# Patient Record
Sex: FEMALE | Race: WHITE | Employment: UNEMPLOYED | URBAN - METROPOLITAN AREA
[De-identification: names, ages, dates, MRNs, and addresses within clinical notes are randomized per-mention and may not be internally consistent; named-entity substitution may affect disease eponyms.]

---

## 2019-02-25 ENCOUNTER — HOSPITAL ENCOUNTER (OUTPATIENT)
Age: 59
Discharge: HOME | End: 2019-02-27

## 2019-02-25 LAB — PROCALCITONIN: 0.14 NG/ML (ref 0–0.08)

## 2019-02-25 PROCEDURE — 84145 PROCALCITONIN (PCT): CPT

## 2019-02-26 ENCOUNTER — HOSPITAL ENCOUNTER (OUTPATIENT)
Age: 59
Discharge: HOME | End: 2019-02-28

## 2019-02-26 LAB — PROCALCITONIN: 1.52 NG/ML (ref 0–0.08)

## 2019-02-26 PROCEDURE — 84145 PROCALCITONIN (PCT): CPT

## 2020-12-20 LAB — HEMOGLOBIN: 6.8 G/DL (ref 11.5–15.5)

## 2021-01-18 ENCOUNTER — TELEPHONE (OUTPATIENT)
Dept: VASCULAR SURGERY | Age: 61
End: 2021-01-18

## 2021-01-19 ENCOUNTER — OFFICE VISIT (OUTPATIENT)
Dept: VASCULAR SURGERY | Age: 61
End: 2021-01-19
Payer: MEDICARE

## 2021-01-19 VITALS — SYSTOLIC BLOOD PRESSURE: 128 MMHG | DIASTOLIC BLOOD PRESSURE: 64 MMHG

## 2021-01-19 DIAGNOSIS — Z99.2 ENCOUNTER REGARDING VASCULAR ACCESS FOR DIALYSIS FOR ESRD (HCC): ICD-10-CM

## 2021-01-19 DIAGNOSIS — N18.6 ESRD ON DIALYSIS (HCC): Primary | ICD-10-CM

## 2021-01-19 DIAGNOSIS — N18.6 ENCOUNTER REGARDING VASCULAR ACCESS FOR DIALYSIS FOR ESRD (HCC): ICD-10-CM

## 2021-01-19 DIAGNOSIS — Z99.2 ESRD ON DIALYSIS (HCC): Primary | ICD-10-CM

## 2021-01-19 PROCEDURE — 99203 OFFICE O/P NEW LOW 30 MIN: CPT | Performed by: SURGERY

## 2021-01-19 RX ORDER — ENTECAVIR 0.5 MG/1
TABLET, FILM COATED ORAL
COMMUNITY
Start: 2020-12-18

## 2021-01-19 RX ORDER — SENNOSIDES 8.6 MG
TABLET ORAL
COMMUNITY
Start: 2020-11-20

## 2021-01-19 RX ORDER — DAPSONE 100 MG/1
100 TABLET ORAL DAILY
COMMUNITY
Start: 2020-09-01

## 2021-01-19 RX ORDER — GABAPENTIN 300 MG/1
300 CAPSULE ORAL 2 TIMES DAILY
COMMUNITY
Start: 2020-12-18

## 2021-01-19 RX ORDER — HYDRALAZINE HYDROCHLORIDE 100 MG/1
100 TABLET, FILM COATED ORAL 2 TIMES DAILY
COMMUNITY
Start: 2020-12-18

## 2021-01-19 RX ORDER — TACROLIMUS 1 MG/1
CAPSULE ORAL
COMMUNITY
Start: 2020-11-18

## 2021-01-19 NOTE — PROGRESS NOTES
Vascular Surgery Outpatient Consultation      Chief Complaint   Patient presents with    Surgical Consult     Needs AVF, dialysis days M-W-F, patient is right handed. Reason for Consult: Dialysis access    Requesting Physician:  Dr. Luis Link:                The patient is a 64 y.o. female who is referred for evaluation of hemodialysis access. The patient developed acute kidney injury following surgery on a ventral hernia. She [facility]. She is right-hand dominant. She is currently being dialyzed through a right jugular catheter. Past Medical History:        Diagnosis Date    Arthritis     Cir[facility] liver ([facility]     Depression     Diabetes mellitus ([facility]     Eye abnormalities     blood behid retina    Hernia of abdominal wall     Hypertension     Neuropathy      Past Surgical History:        Procedure Laterality Date    CHOLECYSTECTOMY      FRACTURE SURGERY      HYSTERECTOMY      LIVER TRANSPLANT  2015    SHOULDER SURGERY Right     TOOTH EXTRACTION       Current Medications:   Prior to Admission medications    Medication Sig Start Date End Date Taking? Authorizing Provider   dapsone 100 MG tablet Take 100 mg by mouth daily 9/1/20  Yes Historical Provider, MD   entecavir (BARACLUDE) 0.5 MG tablet take 1 tablet by mouth every 48 hours 12/18/20  Yes Historical Provider, MD   gabapentin (NEURONTIN) 300 MG capsule Take 300 mg by mouth 2 times daily.  12/18/20  Yes Historical Provider, MD   hydrALAZINE (APRESOLINE) 100 MG tablet Take 100 mg by mouth 2 times daily 12/18/20  Yes Historical Provider, MD   senna (SENOKOT) 8.6 MG TABS tablet  11/20/20  Yes Historical Provider, MD   tacrolimus (PROGRAF) 1 MG capsule TAKE THREE (3) CAPSULES BY MOUTH TWICE DAILY 11/18/20  Yes Historical Provider, MD   tacrolimus (PROGRAF) 0.5 MG capsule Take 1 mg by mouth 2 times daily   Yes Historical Provider, MD   predniSONE (DELTASONE) 1 MG tablet Take 2 mg by mouth daily   Yes Historical Provider, MD   citalopram (CELEXA) 40 MG tablet Take 40 mg by mouth daily. Yes Historical Provider, MD   hydrOXYzine (ATARAX) 25 MG tablet Take 25 mg by mouth 3 times daily as needed. Yes Historical Provider, MD   lisinopril (PRINIVIL;ZESTRIL) 5 MG tablet Take 5 mg by mouth daily. Yes Historical Provider, MD   pantoprazole (PROTONIX) 40 MG tablet Take 40 mg by mouth daily. Yes Historical Provider, MD   Nelfinavir Mesylate (VIRACEPT PO) Take by mouth    Historical Provider, MD   mupirocin (BACTROBAN) 2 % cream Apply topically 3 times daily. Patient not taking: Reported on 2021   Rebecca Ruvalcaba DO   NONFORMULARY Indications: Stevie Dillard rejection pill\"    Historical Provider, MD   hydrochlorothiazide (MICROZIDE) 12.5 MG capsule Take 12.5 mg by mouth daily    Historical Provider, MD   furosemide (LASIX) 40 MG tablet Take 40 mg by mouth daily. Historical Provider, MD     Allergies:  [place] [place] tape]    Social History     Socioeconomic History    Marital status:       Spouse name: Not on file    Number of children: Not on file    Years of education: Not on file    Highest education level: Not on file   Occupational History    Not on file   Social Needs    Financial resource strain: Not on file    Food insecurity     Worry: Not on file     Inability: Not on file    Transportation needs     Medical: Not on file     Non-medical: Not on file   Tobacco Use    Smoking status: Former Smoker     Packs/day: 0.50     Types: Cigarettes     Quit date: 2019     Years since quittin.0    Smokeless tobacco: Never Used   Substance and Sexual Activity    Alcohol use: No    Drug use: Never    Sexual activity: Not on file   Lifestyle    Physical activity     Days per week: Not on file     Minutes per session: Not on file    Stress: Not on file   Relationships    Social connections     Talks on phone: Not on file     Gets together: Not on file     Attends Quaker service: Not on file     Active member of club or organization: Not on file     Attends meetings of clubs or organizations: Not on file     Relationship status: Not on file    Intimate partner violence     Fear of current or ex partner: Not on file     Emotionally abused: Not on file     Physically abused: Not on file     Forced sexual activity: Not on file   Other Topics Concern    Not on file   Social History Narrative    Not on file        History reviewed. No pertinent family history.     REVIEW OF SYSTEMS (New symptoms):    Eyes:      Blurred vision:  No [x]/Yes []               Diplopia:   No [x]/Yes []               Vision loss:       No [x]/Yes []   Ears, nose, throat:             Hearing loss:    No [x]/Yes []      Vertigo:   No [x]/Yes []                       Swallowing problem:  No [x]/Yes []               Nose bleeds:   No [x]/Yes []      Voice hoarseness:  No [x]/Yes []  Respiratory:             Cough:   No [x]/Yes []      Pleuritic chest pain:  No [x]/Yes []                        Dyspnea:   No [x]/Yes []      Wheezing:   No [x]/Yes []  Cardiovascular:             Angina:   No [x]/Yes []      Palpitations:   No [x]/Yes []          Claudication:    No [x]/Yes []      Leg swelling:   No [x]/Yes []  Gastrointestinal:             Nausea or vomiting:  No [x]/Yes []               Abdominal pain:  No [x]/Yes []                     Intestinal bleeding: No [x]/Yes []  Musculoskeletal:             Leg pain:   No [x]/Yes []      Back pain:   No [x]/Yes []                    Weakness:   No [x]/Yes []  Neurologic:             Numbness:   No [x]/Yes []      Paralysis:   No [x]/Yes []                       Headaches:   No [x]/Yes []  Hematologic, lymphatic:   Anemia:   No [x]/Yes []              Bleeding or bruising:  No [x]/Yes []              Fevers or chills: No [x]/Yes []  Endocrine:             Temp intolerance:   No [x]/Yes []                       Polydipsia, polyuria:  No [x]/Yes []  Skin:              Rash:    No [x]/Yes []      Ulcers:   No [x]/Yes []              Abnorm pigment: No [x]/Yes []  :              Frequency/urgency:  No [x]/Yes []      Hematuria:    No [x]/Yes []                      Incontinence:    No [x]/Yes []    PHYSICAL EXAM:  Vitals:    01/19/21 1400   BP: 128/64     General Appearance: alert and oriented to person, place and time, well developed and well- nourished, in no acute distress  Skin: warm and dry, no rash or erythema  Head: normocephalic and atraumatic  Eyes: extraocular eye movements intact, conjunctivae normal  ENT: external ear and ear canal normal bilaterally, nose without deformity  Pulmonary/Chest: clear to auscultation bilaterally- no wheezes, rales or rhonchi, normal air movement, no respiratory distress  Cardiovascular: normal rate, regular rhythm, normal S1 and S2, no murmurs, no carotid bruits  Abdomen: soft, non-tender, non-distended, normal bowel sounds, no masses or organomegaly  Musculoskeletal: normal range of motion, no joint swelling, deformity or tenderness  Neurologic: no cranial nerve deficit, gait, coordination and speech normal  Extremities: no leg edema bilaterally    PULSE EXAM      Right      Left   Brachial     Radial     Femoral     Popliteal     Dorsalis Pedis     Posterior Tibial     (3=normal, 2=diminished, 1=barely palpable, 4=widened)      Problem List Items Addressed This Visit     Encounter regarding vascular access for dialysis for ESRD ([facility] [address] [facility]    ESRD on dialysis ([facility] [address] [facility] - Primary            I reviewed the vein mapping that suggested adequate veins for fistula however on exam, her veins are not visible and most likely will be too deep. I feel that she would be best served with insertion of a graft either a forearm loop or an upper arm graft. I will make the final determination at the time of surgery and I reviewed this with the patient. She understands and agrees. I reviewed the procedure with the patient.   I discussed the risks, benefits, and alternatives of the procedure. Specifically, arterial or nerve injury, steal syndrome, need for future surgeries and revisions, and chronic pain. The patient understands and consents. All questions were answered. No follow-ups on file.

## 2021-01-26 NOTE — PROGRESS NOTES
Patient agreed to COVID test on 1/29 at the  [facility]  located at  [id number] [place] [address] Drive the hours of 6 am- 2:30 pm, instructed to bring ID. Patient instructed to self isolate until day of surgery. Ok to go to dialysis and work. Wear a mask.

## 2021-01-29 ENCOUNTER — HOSPITAL ENCOUNTER (OUTPATIENT)
Age: 61
Discharge: HOME | End: 2021-01-31
Payer: MEDICARE

## 2021-01-29 DIAGNOSIS — U07.1 COVID-19: ICD-10-CM

## 2021-01-29 PROCEDURE — U0003 INFECTIOUS AGENT DETECTION BY NUCLEIC ACID (DNA OR RNA); SEVERE ACUTE RESPIRATORY SYNDROME CORONAVIRUS 2 (SARS-COV-2) (CORONAVIRUS DISEASE [COVID-19]), AMPLIFIED PROBE TECHNIQUE, MAKING USE OF HIGH THROUGHPUT TECHNOLOGIES AS DESCRIBED BY CMS-2020-01-R: HCPCS

## 2021-01-30 LAB
SARS-COV-2: NOT DETECTED
SOURCE: NORMAL

## 2021-02-03 ENCOUNTER — ANESTHESIA EVENT (OUTPATIENT)
Dept: OPERATING ROOM | Age: 61
End: 2021-02-03
Payer: MEDICARE

## 2021-02-03 RX ORDER — ASPIRIN 81 MG/1
81 TABLET, CHEWABLE ORAL DAILY
COMMUNITY
Start: 2020-08-28

## 2021-02-03 RX ORDER — ROPINIROLE 0.5 MG/1
1 TABLET, FILM COATED ORAL
COMMUNITY
Start: 2020-12-18

## 2021-02-03 NOTE — PROGRESS NOTES
[address] [facility] PRE-ADMISSION TESTING GENERAL INSTRUCTIONS- [facility]-phone number:[phone]    GENERAL INSTRUCTIONS  [x] Antibacterial Soap shower Night before and/or AM of Surgery  [] Ready Prep CHG wipe instruction sheet and wipes given. [x] Nothing by mouth after midnight, including gum, candy, mints, or water. [x] You may brush your teeth, gargle, but do NOT swallow water. []Hibiclens shower  the night before and the morning of surgery. Do not use             Hibiclens on your face or head. [x]No smoking, chewing tobacco, illegal drugs, or alcohol within 24 hours of your surgery. [x] Jewelry, valuables or body piercing's should not be brought to the hospital. All body and/or tongue piercing's must be removed prior to arriving to hospital.  ALL hair pins must be removed. [x] Do not wear makeup, lotions, powders, deodorant. Nail polish as directed by the nurse. [x] Arrange transportation with a responsible adult  to and from the hospital. If you do not have a responsible adult  to transport you, you will need to make arrangements with a medical transportation company (i.e. [employer]. A Uber/taxi/bus is not appropriate unless you are accompanied by a responsible adult ). Arrange for someone to be with you for the remainder of the day and for 24 hours after your procedure due to having had anesthesia. Who will be your  for transportation? [employer] Patient states that [employer] a [employer] is bringing Kumar Crowder significant other with her. Who will be staying with you for 24 hrs after your procedure? Kumar Crowder, Significant other  [x] Bring insurance card and photo ID.  [] Transfusion Bracelet: Please bring with you to hospital, day of surgery  [] Bring urine specimen day of surgery. Any small container is acceptable.   [] Use inhalers the morning of surgery and bring with you to hospital.  [x] Bring copy of living will or healthcare power of  papers to be placed in your electronic record. [] CPAP/BI-PAP: Please bring your machine if you are to spend the night in the hospital.     PARKING INSTRUCTIONS:   [x] Arrival Time:6:30 am Park on [facility], enter the main entrance. One person may accompany you. Wear a mask. [x] To reach the [facility] from [facility], upon entering the hospital, take elevator B to the 3rd floor. Check in with the . A parking token will be provided if needed. EDUCATION INSTRUCTIONS:      [] Knee or hip replacement booklet & exercise pamphlets given. [] [address] placed in chart. [] Pre-admission Testing educational folder given  [] Incentive Spirometry,coughing & deep breathing exercises reviewed. []Medication information sheet(s)   []Fluoroscopy-Xray used in surgery reviewed with patient. Educational pamphlet placed in chart. [x]Pain: Post-op pain is normal and to be expected. You will be asked to rate your pain from 0-10 (a zero is not acceptable-education is needed). Your post-op pain goal is:    [] Ask your nurse for your pain medication. [] Joint camp offered. [] Joint replacement booklets given. [x] Other: Wear loose comfortable clothing    MEDICATION INSTRUCTIONS:  [x]Bring a complete list of your medications, please write the last time you took the medicine, give this list to the nurse. [x] Take the following medications the morning of surgery with 1-2 ounces of water: gabapentin, citalopram, hydralazine, entecavir, tacrolimus, dapsone  Take hydroxyzine if needed the morning of surgery with sip of water. [x] Stop herbal supplements and vitamins 5 days before your surgery. [] DO NOT take any diabetic medicine the morning of surgery. Follow instructions for insulin the day before surgery. [] If you are diabetic and your blood sugar is low or you feel symptomatic, you may drink 1-2 ounces of apple juice or take a glucose tablet.   The morning of your

## 2021-02-04 ENCOUNTER — HOSPITAL ENCOUNTER (OUTPATIENT)
Age: 61
Setting detail: OUTPATIENT SURGERY
Discharge: HOME | End: 2021-02-04
Attending: SURGERY | Admitting: SURGERY
Payer: MEDICARE

## 2021-02-04 ENCOUNTER — ANESTHESIA (OUTPATIENT)
Dept: OPERATING ROOM | Age: 61
End: 2021-02-04
Payer: MEDICARE

## 2021-02-04 VITALS
DIASTOLIC BLOOD PRESSURE: 67 MMHG | RESPIRATION RATE: 16 BRPM | TEMPERATURE: 97.1 F | OXYGEN SATURATION: 95 % | SYSTOLIC BLOOD PRESSURE: 125 MMHG | HEIGHT: 63 IN | BODY MASS INDEX: 35.44 KG/M2 | HEART RATE: 72 BPM | WEIGHT: 200 LBS

## 2021-02-04 VITALS — OXYGEN SATURATION: 96 % | DIASTOLIC BLOOD PRESSURE: 72 MMHG | SYSTOLIC BLOOD PRESSURE: 134 MMHG

## 2021-02-04 DIAGNOSIS — N18.6 ENCOUNTER REGARDING VASCULAR ACCESS FOR DIALYSIS FOR ESRD (HCC): ICD-10-CM

## 2021-02-04 DIAGNOSIS — Z99.2 ENCOUNTER REGARDING VASCULAR ACCESS FOR DIALYSIS FOR ESRD (HCC): ICD-10-CM

## 2021-02-04 DIAGNOSIS — U07.1 COVID-19: Primary | ICD-10-CM

## 2021-02-04 DIAGNOSIS — Z01.818 PRE-OP TESTING: ICD-10-CM

## 2021-02-04 LAB
ANION GAP SERPL CALCULATED.3IONS-SCNC: 9 MMOL/L (ref 7–16)
BUN BLDV-MCNC: 42 MG/DL (ref 6–20)
CALCIUM SERPL-MCNC: 8 MG/DL (ref 8.6–10.2)
CHLORIDE BLD-SCNC: 93 MMOL/L (ref 98–107)
CO2: 33 MMOL/L (ref 22–29)
CREAT SERPL-MCNC: 4.2 MG/DL (ref 0.5–1)
EKG ATRIAL RATE: 66 BPM
EKG P AXIS: 28 DEGREES
EKG P-R INTERVAL: 222 MS
EKG Q-T INTERVAL: 438 MS
EKG QRS DURATION: 76 MS
EKG QTC CALCULATION (BAZETT): 459 MS
EKG R AXIS: -14 DEGREES
EKG T AXIS: 46 DEGREES
EKG VENTRICULAR RATE: 66 BPM
GFR AFRICAN AMERICAN: 13
GFR NON-AFRICAN AMERICAN: 11 ML/MIN/1.73
GLUCOSE BLD-MCNC: 116 MG/DL (ref 74–99)
HCT VFR BLD CALC: 27.6 % (ref 34–48)
HEMOGLOBIN: 8.7 G/DL (ref 11.5–15.5)
MCH RBC QN AUTO: 32.7 PG (ref 26–35)
MCHC RBC AUTO-ENTMCNC: 31.5 % (ref 32–34.5)
MCV RBC AUTO: 103.8 FL (ref 80–99.9)
PDW BLD-RTO: 16.8 FL (ref 11.5–15)
PLATELET # BLD: 133 E9/L (ref 130–450)
PMV BLD AUTO: 10.3 FL (ref 7–12)
POTASSIUM REFLEX MAGNESIUM: 4.2 MMOL/L (ref 3.5–5)
POTASSIUM SERPL-SCNC: 4.2 MMOL/L (ref 3.5–5)
RBC # BLD: 2.66 E12/L (ref 3.5–5.5)
SODIUM BLD-SCNC: 135 MMOL/L (ref 132–146)
WBC # BLD: 3.5 E9/L (ref 4.5–11.5)

## 2021-02-04 PROCEDURE — C1768 GRAFT, VASCULAR: HCPCS | Performed by: SURGERY

## 2021-02-04 PROCEDURE — 2580000003 HC RX 258: Performed by: SURGERY

## 2021-02-04 PROCEDURE — 6360000002 HC RX W HCPCS: Performed by: ANESTHESIOLOGY

## 2021-02-04 PROCEDURE — 85027 COMPLETE CBC AUTOMATED: CPT

## 2021-02-04 PROCEDURE — 93005 ELECTROCARDIOGRAM TRACING: CPT | Performed by: ANESTHESIOLOGY

## 2021-02-04 PROCEDURE — 2500000003 HC RX 250 WO HCPCS: Performed by: SURGERY

## 2021-02-04 PROCEDURE — 3600000012 HC SURGERY LEVEL 2 ADDTL 15MIN: Performed by: SURGERY

## 2021-02-04 PROCEDURE — 80048 BASIC METABOLIC PNL TOTAL CA: CPT

## 2021-02-04 PROCEDURE — 6360000002 HC RX W HCPCS: Performed by: SURGERY

## 2021-02-04 PROCEDURE — 3700000000 HC ANESTHESIA ATTENDED CARE: Performed by: SURGERY

## 2021-02-04 PROCEDURE — 7100000010 HC PHASE II RECOVERY - FIRST 15 MIN: Performed by: SURGERY

## 2021-02-04 PROCEDURE — 7100000011 HC PHASE II RECOVERY - ADDTL 15 MIN: Performed by: SURGERY

## 2021-02-04 PROCEDURE — 36415 COLL VENOUS BLD VENIPUNCTURE: CPT

## 2021-02-04 PROCEDURE — 2709999900 HC NON-CHARGEABLE SUPPLY: Performed by: SURGERY

## 2021-02-04 PROCEDURE — 3700000001 HC ADD 15 MINUTES (ANESTHESIA): Performed by: SURGERY

## 2021-02-04 PROCEDURE — 6360000002 HC RX W HCPCS

## 2021-02-04 PROCEDURE — 64415 NJX AA&/STRD BRCH PLXS IMG: CPT | Performed by: ANESTHESIOLOGY

## 2021-02-04 PROCEDURE — 36830 ARTERY-VEIN NONAUTOGRAFT: CPT | Performed by: SURGERY

## 2021-02-04 PROCEDURE — 93010 ELECTROCARDIOGRAM REPORT: CPT | Performed by: INTERNAL MEDICINE

## 2021-02-04 PROCEDURE — 3600000002 HC SURGERY LEVEL 2 BASE: Performed by: SURGERY

## 2021-02-04 DEVICE — GRAFT VASC L30CM ID6MM BOV CAR ART CLLGN FOR FUNC HAD ACCS: Type: IMPLANTABLE DEVICE | Site: ARM | Status: FUNCTIONAL

## 2021-02-04 RX ORDER — OXYCODONE HYDROCHLORIDE AND ACETAMINOPHEN 5; 325 MG/1; MG/1
1 TABLET ORAL EVERY 4 HOURS PRN
Status: DISCONTINUED | OUTPATIENT
Start: 2021-02-04 | End: 2021-02-04 | Stop reason: HOSPADM

## 2021-02-04 RX ORDER — SODIUM CHLORIDE 0.9 % (FLUSH) 0.9 %
10 SYRINGE (ML) INJECTION PRN
Status: DISCONTINUED | OUTPATIENT
Start: 2021-02-04 | End: 2021-02-04 | Stop reason: HOSPADM

## 2021-02-04 RX ORDER — ONDANSETRON 2 MG/ML
4 INJECTION INTRAMUSCULAR; INTRAVENOUS EVERY 8 HOURS PRN
Status: DISCONTINUED | OUTPATIENT
Start: 2021-02-04 | End: 2021-02-04 | Stop reason: HOSPADM

## 2021-02-04 RX ORDER — ROPIVACAINE HYDROCHLORIDE 5 MG/ML
INJECTION, SOLUTION EPIDURAL; INFILTRATION; PERINEURAL
Status: COMPLETED | OUTPATIENT
Start: 2021-02-04 | End: 2021-02-04

## 2021-02-04 RX ORDER — MIDAZOLAM HYDROCHLORIDE 2 MG/2ML
1 INJECTION, SOLUTION INTRAMUSCULAR; INTRAVENOUS ONCE
Status: COMPLETED | OUTPATIENT
Start: 2021-02-04 | End: 2021-02-04

## 2021-02-04 RX ORDER — ROPIVACAINE HYDROCHLORIDE 5 MG/ML
30 INJECTION, SOLUTION EPIDURAL; INFILTRATION; PERINEURAL ONCE
Status: COMPLETED | OUTPATIENT
Start: 2021-02-04 | End: 2021-02-04

## 2021-02-04 RX ORDER — OXYCODONE HYDROCHLORIDE AND ACETAMINOPHEN 5; 325 MG/1; MG/1
1 TABLET ORAL EVERY 6 HOURS PRN
Qty: 20 TABLET | Refills: 0 | Status: SHIPPED | OUTPATIENT
Start: 2021-02-04 | End: 2021-02-09

## 2021-02-04 RX ORDER — LIDOCAINE HYDROCHLORIDE 20 MG/ML
INJECTION, SOLUTION INTRAVENOUS PRN
Status: DISCONTINUED | OUTPATIENT
Start: 2021-02-04 | End: 2021-02-04 | Stop reason: SDUPTHER

## 2021-02-04 RX ORDER — OXYCODONE HYDROCHLORIDE AND ACETAMINOPHEN 5; 325 MG/1; MG/1
2 TABLET ORAL EVERY 4 HOURS PRN
Status: DISCONTINUED | OUTPATIENT
Start: 2021-02-04 | End: 2021-02-04 | Stop reason: HOSPADM

## 2021-02-04 RX ORDER — PROPOFOL 10 MG/ML
INJECTION, EMULSION INTRAVENOUS CONTINUOUS PRN
Status: DISCONTINUED | OUTPATIENT
Start: 2021-02-04 | End: 2021-02-04 | Stop reason: SDUPTHER

## 2021-02-04 RX ORDER — HEPARIN SODIUM 1000 [USP'U]/ML
INJECTION, SOLUTION INTRAVENOUS; SUBCUTANEOUS PRN
Status: DISCONTINUED | OUTPATIENT
Start: 2021-02-04 | End: 2021-02-04 | Stop reason: SDUPTHER

## 2021-02-04 RX ORDER — SODIUM CHLORIDE 0.9 % (FLUSH) 0.9 %
10 SYRINGE (ML) INJECTION EVERY 12 HOURS SCHEDULED
Status: DISCONTINUED | OUTPATIENT
Start: 2021-02-04 | End: 2021-02-04 | Stop reason: HOSPADM

## 2021-02-04 RX ORDER — ONDANSETRON 2 MG/ML
INJECTION INTRAMUSCULAR; INTRAVENOUS PRN
Status: DISCONTINUED | OUTPATIENT
Start: 2021-02-04 | End: 2021-02-04 | Stop reason: SDUPTHER

## 2021-02-04 RX ORDER — FENTANYL CITRATE 50 UG/ML
INJECTION, SOLUTION INTRAMUSCULAR; INTRAVENOUS PRN
Status: DISCONTINUED | OUTPATIENT
Start: 2021-02-04 | End: 2021-02-04 | Stop reason: SDUPTHER

## 2021-02-04 RX ORDER — ACETAMINOPHEN 325 MG/1
650 TABLET ORAL EVERY 4 HOURS PRN
Status: DISCONTINUED | OUTPATIENT
Start: 2021-02-04 | End: 2021-02-04 | Stop reason: HOSPADM

## 2021-02-04 RX ORDER — SODIUM CHLORIDE 9 MG/ML
INJECTION, SOLUTION INTRAVENOUS CONTINUOUS
Status: DISCONTINUED | OUTPATIENT
Start: 2021-02-04 | End: 2021-02-04 | Stop reason: HOSPADM

## 2021-02-04 RX ORDER — HEPARIN SODIUM 1000 [USP'U]/ML
2500 INJECTION, SOLUTION INTRAVENOUS; SUBCUTANEOUS ONCE
Status: COMPLETED | OUTPATIENT
Start: 2021-02-04 | End: 2021-02-04

## 2021-02-04 RX ADMIN — HEPARIN SODIUM 6000 UNITS: 1000 INJECTION INTRAVENOUS; SUBCUTANEOUS at 08:59

## 2021-02-04 RX ADMIN — PROPOFOL 100 MCG/KG/MIN: 10 INJECTION, EMULSION INTRAVENOUS at 08:22

## 2021-02-04 RX ADMIN — FENTANYL CITRATE 25 MCG: 50 INJECTION, SOLUTION INTRAMUSCULAR; INTRAVENOUS at 08:50

## 2021-02-04 RX ADMIN — MIDAZOLAM HYDROCHLORIDE 1 MG: 1 INJECTION, SOLUTION INTRAMUSCULAR; INTRAVENOUS at 08:00

## 2021-02-04 RX ADMIN — FENTANYL CITRATE 25 MCG: 50 INJECTION, SOLUTION INTRAMUSCULAR; INTRAVENOUS at 09:20

## 2021-02-04 RX ADMIN — ROPIVACAINE HYDROCHLORIDE 30 ML: 5 INJECTION EPIDURAL; INFILTRATION; PERINEURAL at 08:05

## 2021-02-04 RX ADMIN — Medication 2000 MG: at 08:28

## 2021-02-04 RX ADMIN — ONDANSETRON HYDROCHLORIDE 4 MG: 2 INJECTION, SOLUTION INTRAMUSCULAR; INTRAVENOUS at 09:41

## 2021-02-04 RX ADMIN — SODIUM CHLORIDE: 9 INJECTION, SOLUTION INTRAVENOUS at 06:56

## 2021-02-04 RX ADMIN — FENTANYL CITRATE 25 MCG: 50 INJECTION, SOLUTION INTRAMUSCULAR; INTRAVENOUS at 09:41

## 2021-02-04 RX ADMIN — HEPARIN SODIUM 2500 UNITS: 1000 INJECTION INTRAVENOUS; SUBCUTANEOUS at 11:09

## 2021-02-04 RX ADMIN — ROPIVACAINE HYDROCHLORIDE 30 ML: 5 INJECTION EPIDURAL; INFILTRATION; PERINEURAL at 08:29

## 2021-02-04 RX ADMIN — FENTANYL CITRATE 25 MCG: 50 INJECTION, SOLUTION INTRAMUSCULAR; INTRAVENOUS at 08:41

## 2021-02-04 RX ADMIN — SODIUM CHLORIDE: 9 INJECTION, SOLUTION INTRAVENOUS at 08:17

## 2021-02-04 RX ADMIN — LIDOCAINE HYDROCHLORIDE 50 MG: 20 INJECTION, SOLUTION INTRAVENOUS at 08:24

## 2021-02-04 ASSESSMENT — PULMONARY FUNCTION TESTS
PIF_VALUE: 0
PIF_VALUE: 0

## 2021-02-04 ASSESSMENT — PAIN - FUNCTIONAL ASSESSMENT: PAIN_FUNCTIONAL_ASSESSMENT: 0-10

## 2021-02-04 ASSESSMENT — PAIN SCALES - GENERAL
PAINLEVEL_OUTOF10: 0

## 2021-02-04 NOTE — ANESTHESIA PRE PROCEDURE
Department of Anesthesiology  Preprocedure Note       Name:  Luciana Ballesteros   Age:  64 y.o.  :  1960                                          MRN:  [id number]         Date:  2021      Surgeon: Danitza Cooper):  Daron Garza MD    Procedure: Procedure(s):  AV GRAFT LEFT ARM    Medications prior to admission:   Prior to Admission medications    Medication Sig Start Date End Date Taking? Authorizing Provider   dapsone 100 MG tablet Take 100 mg by mouth daily 20  Yes Historical Provider, MD   gabapentin (NEURONTIN) 300 MG capsule Take 300 mg by mouth 2 times daily. 20  Yes Historical Provider, MD   hydrALAZINE (APRESOLINE) 100 MG tablet Take 100 mg by mouth 2 times daily 20  Yes Historical Provider, MD   senna (SENOKOT) 8.6 MG TABS tablet  20  Yes Historical Provider, MD   citalopram (CELEXA) 40 MG tablet Take 40 mg by mouth daily. Yes Historical Provider, MD   hydrOXYzine (ATARAX) 25 MG tablet Take 25 mg by mouth 3 times daily as needed. Yes Historical Provider, MD   pantoprazole (PROTONIX) 40 MG tablet Take 40 mg by mouth daily.    Yes Historical Provider, MD   aspirin 81 MG chewable tablet Take 81 mg by mouth daily 20   Historical Provider, MD   rOPINIRole (REQUIP) 0.5 MG tablet Take 1 tablet by mouth every morning (before breakfast) 20   Historical Provider, MD   OXYGEN Inhale 3 L/min into the lungs continuous    Historical Provider, MD   entecavir (BARACLUDE) 0.5 MG tablet take 1 tablet by mouth every 48 hours 20   Historical Provider, MD   tacrolimus (PROGRAF) 1 MG capsule TAKE THREE (3) CAPSULES BY MOUTH TWICE DAILY 20   Historical Provider, MD   Nelfinavir Mesylate (VIRACEPT PO) Take by mouth    Historical Provider, MD   predniSONE (DELTASONE) 1 MG tablet Take 2 mg by mouth daily    Historical Provider, MD   hydrochlorothiazide (MICROZIDE) 12.5 MG capsule Take 12.5 mg by mouth daily    Historical Provider, MD furosemide (LASIX) 40 MG tablet Take 40 mg by mouth daily. Historical Provider, MD   lisinopril (PRINIVIL;ZESTRIL) 5 MG tablet Take 5 mg by mouth daily. Historical Provider, MD       Current medications:    Current Facility-Administered Medications   Medication Dose Route Frequency Provider Last Rate Last Admin    0.9 % sodium chloride infusion   Intravenous Continuous Roxy Bamberger, MD        sodium chloride flush 0.9 % injection 10 mL  10 mL Intravenous 2 times per day Roxy Bamberger, MD        sodium chloride flush 0.9 % injection 10 mL  10 mL Intravenous PRN Roxy Bamberger, MD        ceFAZolin (ANCEF) 2000 mg in sterile water 20 mL IV syringe  2,000 mg Intravenous On Call to [facility] [address], MD           Allergies: Allergies   Allergen Reactions    Tape Evalene Segundo Tape] Rash     Silk or adhesive       Problem List:    Patient Active Problem List   Diagnosis Code    Encounter regarding vascular access for dialysis for ESRD ([facility] N18.6, Z99.2    ESRD on dialysis ([facility] N18.6, Z99.2       Past Medical History:        Diagnosis Date    Arthritis     Cirrhosis of liver ([facility] [address]     Depression     Diabetes mellitus ([facility]     Eye abnormalities     blood behid retina    Hernia of abdominal wall     Hypertension     Neuropathy        Past Surgical History:        Procedure Laterality Date    CHOLECYSTECTOMY      EYE SURGERY      Bilateral eye \"old blood taken out about 2 yes ago 2019\"    FRACTURE SURGERY      HYSTERECTOMY      LIVER TRANSPLANT  2015    SHOULDER SURGERY Right     TOOTH EXTRACTION         Social History:    Social History     Tobacco Use    Smoking status: Former Smoker     Packs/day: 0.50     Types: Cigarettes     Quit date: 2019     Years since quittin.0    Smokeless tobacco: Never Used   Substance Use Topics    Alcohol use:  No                                Counseling given: Not Answered      Vital Signs (Current):   Vitals: 02/03/21 1044 02/04/21 0610   BP:  122/60   Pulse:  72   Resp:  20   Temp:  36.6 °C (97.8 °F)   TempSrc:  Temporal   SpO2:  98%   Weight: 200 lb (90.7 kg) 200 lb (90.7 kg)   Height: 5' 3\" (1.6 m) 5' 3\" (1.6 m)                                              BP Readings from Last 3 Encounters:   02/04/21 122/60   01/19/21 128/64   12/07/16 (!) 173/67       NPO Status:                                                   Date of last liquid consumption: 02/03/21                        Date of last solid food consumption: 02/03/21    BMI:   Wt Readings from Last 3 Encounters:   02/04/21 200 lb (90.7 kg)   12/07/16 166 lb (75.3 kg)   10/08/16 156 lb (70.8 kg)     Body mass index is 35.43 kg/m². CBC:   Lab Results   Component Value Date    WBC 1.9 07/24/2013    RBC 3.99 07/24/2013    HGB 6.8 12/20/2020    HCT 19.9 10/08/2016    MCV 98.1 07/24/2013    RDW 15.0 07/24/2013    PLT 50 07/24/2013       CMP:   Lab Results   Component Value Date     07/24/2013    K 3.7 07/24/2013     07/24/2013    CO2 26 07/24/2013    BUN 12 07/24/2013    CREATININE 0.9 07/24/2013    LABGLOM >60 07/24/2013    GLUCOSE 92 07/24/2013    CALCIUM 9.1 07/24/2013       POC Tests: No results for input(s): POCGLU, POCNA, POCK, POCCL, POCBUN, POCHEMO, POCHCT in the last 72 hours.     Coags:   Lab Results   Component Value Date    PROTIME 16.8 07/24/2013    INR 2.0 07/24/2013       HCG (If Applicable): No results found for: PREGTESTUR, PREGSERUM, HCG, HCGQUANT     ABGs: No results found for: PHART, PO2ART, DGU2YHC, MLG2VEN, BEART, T4VSZYBM     Type & Screen (If Applicable):  No results found for: LABABO, LABRH    Drug/Infectious Status (If Applicable):  No results found for: HIV, HEPCAB    COVID-19 Screening (If Applicable):   Lab Results   Component Value Date    COVID19 Not Detected 01/29/2021         Anesthesia Evaluation  Patient summary reviewed and Nursing notes reviewed no history of anesthetic complications:   Airway: Mallampati: III TM distance: >3 FB   Neck ROM: full  Mouth opening: > = 3 FB Dental:          Pulmonary:Negative Pulmonary ROS breath sounds clear to auscultation  (+) COPD:  sleep apnea:  decreased breath sounds,                             Cardiovascular:    (+) hypertension:, past MI ( mini heart attack 20+ years ago): no interval change,         Rhythm: regular  Rate: normal           Beta Blocker:  Dose within 24 Hrs         Neuro/Psych:   (+) psychiatric history:depression/anxiety             GI/Hepatic/Renal: Neg GI/Hepatic/Renal ROS  (+) liver disease (hx of cirrhosis, got a liver tx 2015):, renal disease: ESRD and dialysis,           Endo/Other:                     Abdominal:           Vascular:                                      Anesthesia Plan      MAC     ASA 3       Induction: intravenous. Anesthetic plan and risks discussed with patient. Use of blood products discussed with patient whom consented to blood products. [facility]   2/4/2021      DOS STAFF ADDENDUM:    Patient seen and chart reviewed. Physical exam and history updated as indicated. NPO status confirmed. Anesthesia options and plan discussed including risks benefits with patient/legal guardian and family as available. Concerns and questions addressed. Consent verbalized to proceed.   Anesthesia plan, options and intraoperative/postoperative concerns discussed with care team.    Bernabe Dorman DO  2/4/2021  7:12 AM

## 2021-02-04 NOTE — ANESTHESIA POSTPROCEDURE EVALUATION
Department of Anesthesiology  Postprocedure Note    Patient: Dragan Emanuel  MRN: [id number]  YOB: 1960  Date of evaluation: 2/4/2021  Time:  10:26 AM     Procedure Summary     Date: 02/04/21 Room / Location: [facility] OR 04 / [facility]    Anesthesia Start: 1228 Anesthesia Stop: 1007    Procedure: AV GRAFT LEFT ARM (Left ) Diagnosis: (CHRONIC RENAL FAILURE)    Surgeons: Ghanshyam Saleh MD Responsible Provider: Odilia Bowers DO    Anesthesia Type: MAC ASA Status: 3          Anesthesia Type: MAC    Candice Phase I: Candice Score: 9    Candice Phase II: Candice Score: 10    Last vitals: Reviewed and per EMR flowsheets.        Anesthesia Post Evaluation    Patient location during evaluation: bedside  Patient participation: complete - patient cannot participate  Level of consciousness: awake and alert  Airway patency: patent  Nausea & Vomiting: no nausea and no vomiting  Complications: no  Cardiovascular status: blood pressure returned to baseline  Respiratory status: acceptable  Hydration status: euvolemic

## 2021-02-04 NOTE — ANESTHESIA PROCEDURE NOTES
Peripheral Block    Patient location during procedure: pre-op  Staffing  Performed: anesthesiologist   Anesthesiologist: Aliyah Pop DO  Preanesthetic Checklist  Completed: patient identified, IV checked, site marked, risks and benefits discussed, surgical consent, monitors and equipment checked, pre-op evaluation, timeout performed, anesthesia consent given, oxygen available and patient being monitored  Peripheral Block  Patient position: sitting  Prep: ChloraPrep  Patient monitoring: cardiac monitor, continuous pulse ox, frequent blood pressure checks and IV access  Block type: Brachial plexus  Laterality: left  Injection technique: single-shot  Guidance: ultrasound guided  Local infiltration: lidocaine  Infiltration strength: 1 %  Dose: 3 mL  Interscalene  Provider prep: mask and sterile gloves  Local infiltration: lidocaine  Needle  Needle gauge: 21 G  Needle length: 10 cm  Needle localization: ultrasound guidance  Assessment  Injection assessment: negative aspiration for heme, no paresthesia on injection and local visualized surrounding nerve on ultrasound  Paresthesia pain: none  Slow fractionated injection: yes  Hemodynamics: stable  Additional Notes  Timeout performed            Medications Administered  Ropivacaine (NAROPIN) injection 0.5%, 30 mL  Reason for block: post-op pain management and at surgeon's request

## 2021-02-04 NOTE — PROGRESS NOTES
Admitted to Same Day Surgery. Preop instructions given to patient. COVID testing completed on: 1/29/21  Results of the test: not detected  The patient verbally confirms that they did adhere to the self-quarantine guidelines. No signs or symptoms expressed or observed.

## 2021-02-04 NOTE — OP NOTE
Operative Note      Patient: Belkis Duenas  YOB: 1960  MRN: [id number]    Date of Procedure: 2/4/2021    Pre-Op Diagnosis: CHRONIC RENAL FAILURE, stage 5    Post-Op Diagnosis: Same       Procedure(s):  AV GRAFT LEFT ARM    Surgeon(s):  Placido Ibrahim MD    Assistant:   Achille Sandifer, CFA    Anesthesia: Regional    Estimated Blood Loss (mL): Minimal    Complications: None    Specimens:   * No specimens in log *    Implants:  Implant Name Type Inv. Item Serial No.  Lot No. LRB No. Used Action   GRAFT VASC L30CM [id number] BOV CAR ART CLLGN FOR FUNC HAD ACCS  GRAFT VASC L30CM [id number] BOV CAR ART CLLGN FOR FUNC HAD ACCS  [employer] [id number] Left 1 Implanted         Drains: * No LDAs found *    Findings:     Detailed Description of Procedure: The patient was identified and the procedure was confirmed. The left arm was prepped and draped in the usual sterile fashion. Lidocaine 1% mixed with 0.25% Marcaine was used for local anesthesia. A skin incision was made in the proximal forearm and carried down through the subcutaneous tissue. The brachial artery and vein were identified and freed from the surrounding tissues. A second skin incision was made distally and continued through the subcutaneous tissue. The bovine carotid graft was then marked for orientation and pulled through a subcutaneous tunnel using the Barbara-Wick tunneler forming a loop. The orientation of the graft was that the arterial portion of the loop was lateral.  The patient was heparinized, and the artery was clamped proximally and distally, and the longitudinal arteriotomy measuring 5 mm was made. The graft was cut to the appropriate length and spatulated, and anastomosed to the side of the artery using a running 6-0 Prolene suture. After completing the anastomosis, the clamps were released. The vein was clamped proximally and distally, and the longitudinal venotomy measuring 10 mm was made.  The graft was cut to the appropriate length and spatulated, and anastomosed to the side of the vein using a running 6-0 Prolene suture. Prior to completing the anastomosis, the graft and vessels were flushed. The anastamoses was completed and the clamps were released. Good flow was appreciated through the fistula, and a 2+ radial pulse was appreciated at the wrist. Hemostasis was obtained, and the incisions were irrigated with saline solution. The incisions were approximated with multiple layers of Vicryl suture, and skin adhesive was applied to the incisions in the operating room. Needle, sponge and instrument counts were reported as correct x2. The patient tolerated the procedure and was transferred to the recovery area in satisfactory condition.          Electronically signed by Tri Tao MD on 2/4/2021 at 10:05 AM

## 2021-02-04 NOTE — H&P
Vascular Surgery History & Physical Exam      Chief Complaint: CRF    HISTORY OF PRESENT ILLNESS:                The patient is a 64 y.o. female who presents to the hospital for elective creation of a arteriovenous fistula. The patient denies any problems since the last office visit. IMPRESSION:   Active Hospital Problems    Diagnosis    Encounter regarding vascular access for dialysis for ESRD ([facility] [address] [N18.6, Z99.2]    ESRD on dialysis ([address] [N18.6, Z99.2]       PLAN:  Creation of a left arm arteriovenous graft. I reviewed the procedure with the patient. I discussed the risks, benefits, and alternatives of the procedure. The patient understands and consents. All questions were answered.     Patient Active Problem List   Diagnosis Code    Encounter regarding vascular access for dialysis for ESRD ([facility] [address] N18.6, Z99.2    ESRD on dialysis ([address] N18.6, Z99.2       Past Medical History:   Diagnosis Date    Arthritis     Cirrhosis of liver ([facility] [address]     Depression     Diabetes mellitus ([facility] [address]     Eye abnormalities     blood behid retina    Hernia of abdominal wall     Hypertension     Neuropathy         Past Surgical History:   Procedure Laterality Date    CHOLECYSTECTOMY      EYE SURGERY      Bilateral eye \"old blood taken out about 2 yes ago 2019\"    FRACTURE SURGERY      HYSTERECTOMY      LIVER TRANSPLANT  2015    SHOULDER SURGERY Right     TOOTH EXTRACTION         Current Medications:     Current Facility-Administered Medications:     0.9 % sodium chloride infusion, , Intravenous, Continuous, Justin Cleveland MD, Last Rate: 50 mL/hr at 02/04/21 0656, New Bag at 02/04/21 0656    sodium chloride flush 0.9 % injection 10 mL, 10 mL, Intravenous, 2 times per day, Justin Cleveland MD    sodium chloride flush 0.9 % injection 10 mL, 10 mL, Intravenous, PRN, Justin Cleveland MD    ceFAZolin (ANCEF) 2000 mg in sterile water 20 mL IV syringe, 2,000 mg, Intravenous, On Call to [address], Justin Cleveland, MD    Allergies:  Tape Whit Dany tape]    Social History     Socioeconomic History    Marital status:      Spouse name: Not on file    Number of children: Not on file    Years of education: Not on file    Highest education level: Not on file   Occupational History    Not on file   Social Needs    Financial resource strain: Not on file    Food insecurity     Worry: Not on file     Inability: Not on file    Transportation needs     Medical: Not on file     Non-medical: Not on file   Tobacco Use    Smoking status: Former Smoker     Packs/day: 0.50     Types: Cigarettes     Quit date: 2019     Years since quittin.0    Smokeless tobacco: Never Used   Substance and Sexual Activity    Alcohol use: No    Drug use: Never    Sexual activity: Not on file   Lifestyle    Physical activity     Days per week: Not on file     Minutes per session: Not on file    Stress: Not on file   Relationships    Social connections     Talks on phone: Not on file     Gets together: Not on file     Attends Sabianism service: Not on file     Active member of club or organization: Not on file     Attends meetings of clubs or organizations: Not on file     Relationship status: Not on file    Intimate partner violence     Fear of current or ex partner: Not on file     Emotionally abused: Not on file     Physically abused: Not on file     Forced sexual activity: Not on file   Other Topics Concern    Not on file   Social History Narrative    Not on file        History reviewed. No pertinent family history. REVIEW OF SYSTEMS:  The chart was reviewed.     PHYSICAL EXAM:    Vitals:    21 0610   BP: 122/60   Pulse: 72   Resp: 20   Temp: 97.8 °F (36.6 °C)   SpO2: 98%     CONSTITUTIONAL:  awake, alert, cooperative, no apparent distress  LUNGS:  no increased work of breathing, good air exchange and clear to auscultation  CARDIOVASCULAR:  regular rate and rhythm and radial pulses 2+ bilaterally  ABDOMEN:  soft, non-distended and non-tender    LABS:    Lab Results   Component Value Date    WBC 3.5 (L) 02/04/2021    HGB 8.7 (L) 02/04/2021    HCT 27.6 (L) 02/04/2021     02/04/2021    PROTIME 16.8 (H) 07/24/2013    INR 2.0 07/24/2013    K 3.7 07/24/2013    BUN 12 07/24/2013    CREATININE 0.9 07/24/2013       RADIOLOGY:

## 2021-02-23 ENCOUNTER — OFFICE VISIT (OUTPATIENT)
Dept: VASCULAR SURGERY | Age: 61
End: 2021-02-23

## 2021-02-23 VITALS — HEIGHT: 63 IN | BODY MASS INDEX: 35.44 KG/M2 | WEIGHT: 200 LBS

## 2021-02-23 DIAGNOSIS — Z99.2 ENCOUNTER REGARDING VASCULAR ACCESS FOR DIALYSIS FOR ESRD (HCC): Primary | ICD-10-CM

## 2021-02-23 DIAGNOSIS — N18.6 ENCOUNTER REGARDING VASCULAR ACCESS FOR DIALYSIS FOR ESRD (HCC): Primary | ICD-10-CM

## 2021-02-23 PROCEDURE — 99024 POSTOP FOLLOW-UP VISIT: CPT | Performed by: SURGERY

## 2021-02-23 NOTE — PROGRESS NOTES
2/23/2021    Allyssa Padron  1960    Chief Complaint   Patient presents with    Post-Op Check     left arm graft       Patient returns for post operative evaluation status post creation of a left AVG. The patient denies any unexpected problems since the procedure. Procedure Laterality Date    CHOLECYSTECTOMY      EYE SURGERY      Bilateral eye \"old blood taken out about 2 yes ago 2019\"    FRACTURE SURGERY      HYSTERECTOMY      LIVER TRANSPLANT  2015    SHOULDER SURGERY Right     SHUNT REVISION Left 2/4/2021    AV GRAFT LEFT ARM performed by Cata Cuevas MD at [address]         Physical Exam:  The incision(s) are healing without evidence of infection. Heart rhythm is regular. Good thrill appreciated over the graft. Mild swelling of the forearm. Right      Left   Brachial     Radial     Femoral     Popliteal     Dorsalis Pedis     Posterior Tibial     (3=normal, 2=diminished, 1=barely palpable, 4=widened)    Assessment:  Post-operative AV access. Problem List Items Addressed This Visit     Encounter regarding vascular access for dialysis for ESRD ([facility] [address] - Primary          I reviewed with the patient that normal activities can be resumed as tolerated. Plan: We will notify the dialysis center that they can begin accessing the graft next week. Hopefully we can remove the tunneled catheter soon.

## 2021-02-24 ENCOUNTER — TELEPHONE (OUTPATIENT)
Dept: VASCULAR SURGERY | Age: 61
End: 2021-02-24

## 2023-02-17 ENCOUNTER — HOSPITAL ENCOUNTER (EMERGENCY)
Age: 63
Discharge: HOME | End: 2023-02-17
Attending: EMERGENCY MEDICINE
Payer: MEDICARE

## 2023-02-17 VITALS
SYSTOLIC BLOOD PRESSURE: 114 MMHG | WEIGHT: 200 LBS | RESPIRATION RATE: 18 BRPM | BODY MASS INDEX: 35.43 KG/M2 | HEART RATE: 71 BPM | OXYGEN SATURATION: 100 % | DIASTOLIC BLOOD PRESSURE: 58 MMHG | TEMPERATURE: 97.9 F

## 2023-02-17 DIAGNOSIS — D64.9 CHRONIC ANEMIA: Primary | ICD-10-CM

## 2023-02-17 LAB
ABO/RH: NORMAL
ANTIBODY SCREEN: NORMAL
BASOPHILS ABSOLUTE: 0.01 E9/L (ref 0–0.2)
BASOPHILS RELATIVE PERCENT: 0.3 % (ref 0–2)
EOSINOPHILS ABSOLUTE: 0.03 E9/L (ref 0.05–0.5)
EOSINOPHILS RELATIVE PERCENT: 1 % (ref 0–6)
HCT VFR BLD CALC: 20.7 % (ref 34–48)
HCT VFR BLD CALC: 25 % (ref 34–48)
HEMOGLOBIN: 7.1 G/DL (ref 11.5–15.5)
HEMOGLOBIN: 8.2 G/DL (ref 11.5–15.5)
IMMATURE GRANULOCYTES #: 0.01 E9/L
IMMATURE GRANULOCYTES %: 0.3 % (ref 0–5)
LYMPHOCYTES ABSOLUTE: 0.94 E9/L (ref 1.5–4)
LYMPHOCYTES RELATIVE PERCENT: 30.3 % (ref 20–42)
MCH RBC QN AUTO: 37.4 PG (ref 26–35)
MCHC RBC AUTO-ENTMCNC: 32.8 % (ref 32–34.5)
MCV RBC AUTO: 114.2 FL (ref 80–99.9)
MONOCYTES ABSOLUTE: 0.24 E9/L (ref 0.1–0.95)
MONOCYTES RELATIVE PERCENT: 7.7 % (ref 2–12)
NEUTROPHILS ABSOLUTE: 1.87 E9/L (ref 1.8–7.3)
NEUTROPHILS RELATIVE PERCENT: 60.4 % (ref 43–80)
PDW BLD-RTO: 14.6 FL (ref 11.5–15)
PLATELET # BLD: 144 E9/L (ref 130–450)
PMV BLD AUTO: 11.1 FL (ref 7–12)
POIKILOCYTES: ABNORMAL
POLYCHROMASIA: ABNORMAL
RBC # BLD: 2.19 E12/L (ref 3.5–5.5)
REASON FOR REJECTION: NORMAL
REJECTED TEST: NORMAL
TEAR DROP CELLS: ABNORMAL
WBC # BLD: 3.1 E9/L (ref 4.5–11.5)

## 2023-02-17 PROCEDURE — 85025 COMPLETE CBC W/AUTO DIFF WBC: CPT

## 2023-02-17 PROCEDURE — 6360000002 HC RX W HCPCS: Performed by: EMERGENCY MEDICINE

## 2023-02-17 PROCEDURE — 86900 BLOOD TYPING SEROLOGIC ABO: CPT

## 2023-02-17 PROCEDURE — 86901 BLOOD TYPING SEROLOGIC RH(D): CPT

## 2023-02-17 PROCEDURE — 86850 RBC ANTIBODY SCREEN: CPT

## 2023-02-17 PROCEDURE — 96372 THER/PROPH/DIAG INJ SC/IM: CPT

## 2023-02-17 PROCEDURE — 99284 EMERGENCY DEPT VISIT MOD MDM: CPT

## 2023-02-17 PROCEDURE — 36415 COLL VENOUS BLD VENIPUNCTURE: CPT

## 2023-02-17 RX ORDER — HYDROXYZINE HYDROCHLORIDE 50 MG/ML
25 INJECTION, SOLUTION INTRAMUSCULAR ONCE
Status: COMPLETED | OUTPATIENT
Start: 2023-02-17 | End: 2023-02-17

## 2023-02-17 RX ADMIN — HYDROXYZINE HYDROCHLORIDE 25 MG: 50 INJECTION, SOLUTION INTRAMUSCULAR at 15:02

## 2023-02-17 ASSESSMENT — ENCOUNTER SYMPTOMS
BACK PAIN: 0
SHORTNESS OF BREATH: 0
ABDOMINAL PAIN: 0
COUGH: 0

## 2023-02-17 ASSESSMENT — PAIN - FUNCTIONAL ASSESSMENT
PAIN_FUNCTIONAL_ASSESSMENT: NONE - DENIES PAIN
PAIN_FUNCTIONAL_ASSESSMENT: 0-10

## 2023-02-17 NOTE — ED PROVIDER NOTES
This is a 72-year-old female with a past medical history of end-stage renal disease on dialysis who presents to the ED for evaluation of abnormal labs. Patient states that she was sent in by her nephrology team as she was anemic and was told she needs a transfusion of blood. Patient states she had dialysis today without any issues. Patient has no complaints this time denies any black or bloody stools. Patient's not any blood thinners. No other reported mitigating or exacerbating factors. The history is provided by the patient. Review of Systems   Constitutional:  Negative for fever. HENT:  Negative for congestion. Eyes:  Negative for visual disturbance. Respiratory:  Negative for cough and shortness of breath. Cardiovascular:  Negative for chest pain. Gastrointestinal:  Negative for abdominal pain. Endocrine: Negative for polyuria. Musculoskeletal:  Negative for back pain. Skin:  Negative for rash. Allergic/Immunologic: Negative for immunocompromised state. Neurological:  Negative for headaches. Hematological:  Does not bruise/bleed easily. Psychiatric/Behavioral:  Negative for confusion. Physical Exam  Vitals and nursing note reviewed. Constitutional:       General: She is not in acute distress. Appearance: She is well-developed. HENT:      Head: Normocephalic and atraumatic. Mouth/Throat:      Mouth: Mucous membranes are moist.   Eyes:      Extraocular Movements: Extraocular movements intact. Neck:      Vascular: No JVD. Cardiovascular:      Rate and Rhythm: Normal rate and regular rhythm. Pulmonary:      Effort: Pulmonary effort is normal.      Breath sounds: No wheezing, rhonchi or rales. Chest:      Chest wall: No tenderness. Abdominal:      General: There is no distension. Palpations: Abdomen is soft. Musculoskeletal:      Cervical back: Normal range of motion and neck supple. Right lower leg: No edema.       Left lower leg: No edema.   Skin:     General: Skin is warm and dry. Capillary Refill: Capillary refill takes less than 2 seconds. Neurological:      General: No focal deficit present. Mental Status: She is alert and oriented to person, place, and time. Cranial Nerves: No cranial nerve deficit. Psychiatric:         Mood and Affect: Mood normal.         Behavior: Behavior normal.        Procedures     MDM  Number of Diagnoses or Management Options  Chronic anemia  Diagnosis management comments: This is a pleasant 61-year-old male with a past medical history of end-stage renal disease on dialysis who presented to the ED for evaluation of an apparent abnormal lab. Patient was told her hemoglobin was below 7 and was advised to come infusion. Patient was nontoxic no distress. Patient stated have some itchiness which she gets after she gets dialysis but no other symptoms. Patient no black or bloody stools and no complaints of fatigue or shortness of breath. A broad differential is low but was not limited to such. ECC was performed and actually showed improvement in her hemoglobin and a over 8 which is a baseline for the patient no further indication for inhaler transvenous point time. Patient is to follow-up outpatient for recheck of her labs with her nephrology team and PCP. Patient was given return precautions was agreeable with this.                  Differential diagnosis: Hemorrhagic Shock, GI bleed, Chronic Anemia  Review of ED/ Outpatient Records: Reviewed Dr. Machuca Salvage note from 8/7/20  Historians that case was discussed with: None  My EKG interpretation: None  Imaging interpretation by myself: None  Independent Interpretation of labs: Hemoglobin improved from previous lab, stable  Discussed with other providers: None  Tests Considered but not ordered: None  Decision making tools/risks stratification: None  Disposition decision making/shared decision making: Shared  Chronic Conditions affecting care: ESRD, Chronic Anemia  Social Determinants of health impacting treatment or disposition: None  CODE status and Discussions: Full                    --------------------------------------------- PAST HISTORY ---------------------------------------------  Past Medical History:  has a past medical history of Arthritis, Cirrhosis of liver ([facility] [address] Depression, Diabetes mellitus ([facility] [address] Eye abnormalities, Hernia of abdominal wall, Hypertension, and Neuropathy. Past Surgical History:  has a past surgical history that includes Cholecystectomy; fracture surgery; Liver transplant (2015); Hysterectomy; Tooth Extraction; shoulder surgery (Right); eye surgery; and shunt revision (Left, 2/4/2021). Social History:  reports that she quit smoking about 4 years ago. Her smoking use included cigarettes. She smoked an average of .5 packs per day. She has never used smokeless tobacco. She reports that she does not drink alcohol and does not use drugs. Family History: family history is not on file. The patients home medications have been reviewed.     Allergies: Tape [adhesive tape]    -------------------------------------------------- RESULTS -------------------------------------------------  Labs:  Results for orders placed or performed during the hospital encounter of 02/17/23   CBC with Auto Differential   Result Value Ref Range    WBC 3.1 (L) 4.5 - 11.5 E9/L    RBC 2.19 (L) 3.50 - 5.50 E12/L    Hemoglobin 8.2 (L) 11.5 - 15.5 g/dL    Hematocrit 25.0 (L) 34.0 - 48.0 %    .2 (H) 80.0 - 99.9 fL    MCH 37.4 (H) 26.0 - 35.0 pg    MCHC 32.8 32.0 - 34.5 %    RDW 14.6 11.5 - 15.0 fL    Platelets 413 758 - 301 E9/L    MPV 11.1 7.0 - 12.0 fL    Neutrophils % 60.4 43.0 - 80.0 %    Immature Granulocytes % 0.3 0.0 - 5.0 %    Lymphocytes % 30.3 20.0 - 42.0 %    Monocytes % 7.7 2.0 - 12.0 %    Eosinophils % 1.0 0.0 - 6.0 %    Basophils % 0.3 0.0 - 2.0 %    Neutrophils Absolute 1.87 1.80 - 7.30 E9/L    Immature Granulocytes # 0.01 E9/L Lymphocytes Absolute 0.94 (L) 1.50 - 4.00 E9/L    Monocytes Absolute 0.24 0.10 - 0.95 E9/L    Eosinophils Absolute 0.03 (L) 0.05 - 0.50 E9/L    Basophils Absolute 0.01 0.00 - 0.20 E9/L    Polychromasia 1+     Poikilocytes 1+     Tear Drop Cells 1+    SPECIMEN REJECTION   Result Value Ref Range    Rejected Test cmpx     Reason for Rejection see below    TYPE AND SCREEN   Result Value Ref Range    ABO/Rh A POS     Antibody Screen NEG        Radiology:  No orders to display       ------------------------- NURSING NOTES AND VITALS REVIEWED ---------------------------  Date / Time Roomed:  2/17/2023  2:04 PM  ED Bed Assignment:  12/12    The nursing notes within the ED encounter and vital signs as below have been reviewed. BP (!) 114/58   Pulse 71   Temp 97.9 °F (36.6 °C)   Resp 18   Wt 200 lb (90.7 kg)   SpO2 100%   BMI 35.43 kg/m²   Oxygen Saturation Interpretation: Normal      ------------------------------------------ PROGRESS NOTES ------------------------------------------  2:26 PM EST  I have spoken with the patient and discussed todays results, in addition to providing specific details for the plan of care and counseling regarding the diagnosis and prognosis. Their questions are answered at this time and they are agreeable with the plan. I discussed at length with them reasons for immediate return here for re evaluation. They will followup with their PCP      --------------------------------- ADDITIONAL PROVIDER NOTES ---------------------------------  At this time the patient is without objective evidence of an acute process requiring hospitalization or inpatient management. They have remained hemodynamically stable throughout their entire ED visit and are stable for discharge with outpatient follow-up. The plan has been discussed in detail and they are aware of the specific conditions for emergent return, as well as the importance of follow-up.       Discharge Medication List as of 2/17/2023  4:23 PM          Diagnosis:  1. Chronic anemia        Disposition:  Patient's disposition: Discharge to home  Patient's condition is stable.         Lindsay Perrin DO  02/18/23 1237

## 2023-02-17 NOTE — ED NOTES
RN assumed pt care from Aram, [address] [place] [address]. Aram DAVID stated he collected and sent blood ordered on pt.      Rush Castleman  02/17/23 0967

## 2023-02-20 ENCOUNTER — TELEPHONE (OUTPATIENT)
Dept: VASCULAR SURGERY | Age: 63
End: 2023-02-20

## 2023-03-08 ENCOUNTER — HOSPITAL ENCOUNTER (EMERGENCY)
Age: 63
Discharge: HOME | End: 2023-03-09
Attending: EMERGENCY MEDICINE
Payer: MEDICARE

## 2023-03-08 ENCOUNTER — APPOINTMENT (OUTPATIENT)
Dept: GENERAL RADIOLOGY | Age: 63
End: 2023-03-08
Payer: MEDICARE

## 2023-03-08 VITALS
SYSTOLIC BLOOD PRESSURE: 130 MMHG | TEMPERATURE: 98.5 F | OXYGEN SATURATION: 95 % | RESPIRATION RATE: 20 BRPM | DIASTOLIC BLOOD PRESSURE: 63 MMHG | HEART RATE: 71 BPM

## 2023-03-08 DIAGNOSIS — K59.00 CONSTIPATION, UNSPECIFIED CONSTIPATION TYPE: Primary | ICD-10-CM

## 2023-03-08 LAB
ALBUMIN SERPL-MCNC: 3.9 G/DL (ref 3.5–5.2)
ALP BLD-CCNC: 46 U/L (ref 35–104)
ALT SERPL-CCNC: 18 U/L (ref 0–32)
ANION GAP SERPL CALCULATED.3IONS-SCNC: 9 MMOL/L (ref 7–16)
AST SERPL-CCNC: 29 U/L (ref 0–31)
BASOPHILS ABSOLUTE: 0 E9/L (ref 0–0.2)
BASOPHILS RELATIVE PERCENT: 0.6 % (ref 0–2)
BILIRUB SERPL-MCNC: 1.1 MG/DL (ref 0–1.2)
BUN BLDV-MCNC: 15 MG/DL (ref 6–23)
CALCIUM SERPL-MCNC: 9.2 MG/DL (ref 8.6–10.2)
CHLORIDE BLD-SCNC: 94 MMOL/L (ref 98–107)
CO2: 33 MMOL/L (ref 22–29)
CREAT SERPL-MCNC: 4.2 MG/DL (ref 0.5–1)
EOSINOPHILS ABSOLUTE: 0 E9/L (ref 0.05–0.5)
EOSINOPHILS RELATIVE PERCENT: 0.9 % (ref 0–6)
GFR SERPL CREATININE-BSD FRML MDRD: 12 ML/MIN/1.73
GLUCOSE BLD-MCNC: 103 MG/DL (ref 74–99)
HCT VFR BLD CALC: 26.6 % (ref 34–48)
HEMOGLOBIN: 8.2 G/DL (ref 11.5–15.5)
LACTIC ACID: 1.1 MMOL/L (ref 0.5–2.2)
LIPASE: 16 U/L (ref 13–60)
LYMPHOCYTES ABSOLUTE: 1.05 E9/L (ref 1.5–4)
LYMPHOCYTES RELATIVE PERCENT: 30.4 % (ref 20–42)
MCH RBC QN AUTO: 35.5 PG (ref 26–35)
MCHC RBC AUTO-ENTMCNC: 30.8 % (ref 32–34.5)
MCV RBC AUTO: 115.2 FL (ref 80–99.9)
MONOCYTES ABSOLUTE: 0.07 E9/L (ref 0.1–0.95)
MONOCYTES RELATIVE PERCENT: 1.7 % (ref 2–12)
NEUTROPHILS ABSOLUTE: 2.38 E9/L (ref 1.8–7.3)
NEUTROPHILS RELATIVE PERCENT: 67.8 % (ref 43–80)
PDW BLD-RTO: 15.4 FL (ref 11.5–15)
PLATELET # BLD: 103 E9/L (ref 130–450)
PMV BLD AUTO: 9.8 FL (ref 7–12)
POLYCHROMASIA: ABNORMAL
POTASSIUM SERPL-SCNC: 4 MMOL/L (ref 3.5–5)
RBC # BLD: 2.31 E12/L (ref 3.5–5.5)
SODIUM BLD-SCNC: 136 MMOL/L (ref 132–146)
TOTAL PROTEIN: 6.9 G/DL (ref 6.4–8.3)
WBC # BLD: 3.5 E9/L (ref 4.5–11.5)

## 2023-03-08 PROCEDURE — 80053 COMPREHEN METABOLIC PANEL: CPT

## 2023-03-08 PROCEDURE — 83605 ASSAY OF LACTIC ACID: CPT

## 2023-03-08 PROCEDURE — 85025 COMPLETE CBC W/AUTO DIFF WBC: CPT

## 2023-03-08 PROCEDURE — 83690 ASSAY OF LIPASE: CPT

## 2023-03-08 PROCEDURE — 74022 RADEX COMPL AQT ABD SERIES: CPT

## 2023-03-08 PROCEDURE — 99284 EMERGENCY DEPT VISIT MOD MDM: CPT

## 2023-03-08 ASSESSMENT — PAIN SCALES - GENERAL: PAINLEVEL_OUTOF10: 10

## 2023-03-08 ASSESSMENT — PAIN - FUNCTIONAL ASSESSMENT: PAIN_FUNCTIONAL_ASSESSMENT: 0-10

## 2023-03-08 NOTE — ED PROVIDER NOTES
Department of Emergency Medicine   ED  Provider Note  Admit Date/RoomTime: 3/8/2023  3:00 PM  ED Room: 04/04          History of Present Illness:  3/8/23, Time: 3:22 PM EST  Chief Complaint   Patient presents with    Constipation     Per patient called PCP and was told to come for xrays and enema. Per augie no BM for 10 days                Anup Barrios is a 63 y.o. female presenting to the ED for constipation, beginning  about 10 days ago. The complaint has been persistent, mild in severity, and worsened by nothing. Patient says she's been constipated for the last 10 days. Has been taking senokot with minimal relief. Says that she had a small bowel movement yesterday. Went for her dialysis session today and had nausea. Had a full dialysis session then spoke with her PCP who recommended she come to the ED for XR and soap suds enema. Patient says she feels full. Hx of liver transplant at the [facility] in 2015 and hernia repair at [facility] a few years ago. Review of Systems:   A complete review of systems was performed and pertinent positives and negatives are stated within HPI, all other systems reviewed and are negative.        --------------------------------------------- PAST HISTORY ---------------------------------------------  Past Medical History:  has a past medical history of Arthritis, Cirrhosis of liver ([facility] [address] Depression, Diabetes mellitus ([facility] [address] Eye abnormalities, Hernia of abdominal wall, Hypertension, and Neuropathy. Past Surgical History:  has a past surgical history that includes Cholecystectomy; fracture surgery; Liver transplant (2015); Hysterectomy; Tooth Extraction; shoulder surgery (Right); eye surgery; and shunt revision (Left, 2/4/2021). Social History:  reports that she quit smoking about 4 years ago. Her smoking use included cigarettes. She smoked an average of .5 packs per day.  She has never used smokeless tobacco. She reports that she does not drink alcohol and does not use drugs. Family History: family history is not on file. . Unless otherwise noted, family history is non contributory    The patients home medications have been reviewed. Allergies: Tape [adhesive tape]    I have reviewed the past medical history, past surgical history, social history, and family history    ---------------------------------------------------PHYSICAL EXAM--------------------------------------    Constitutional/General: Alert and oriented x3  Head: Normocephalic and atraumatic  Eyes: PERRL, EOMI, sclera non icteric  ENT: Oropharynx clear, handling secretions, no trismus, no asymmetry of the posterior oropharynx or uvular edema  Neck: Supple, full ROM, no stridor, no meningeal signs  Respiratory: Lungs clear to auscultation bilaterally, no wheezes, rales, or rhonchi. Not in respiratory distress  Cardiovascular:  Regular rate. Regular rhythm. No murmurs, no gallops, no rubs. 2+ distal pulses. Equal extremity pulses. Gastrointestinal:  Abdomen Soft, Non tender, Non distended. No rebound, guarding, or rigidity. No pulsatile masses. Musculoskeletal: Moves all extremities x 4. Warm and well perfused, no clubbing, no cyanosis, no edema. Capillary refill <3 seconds  Skin: skin warm and dry. No rashes. Neurologic: GCS 15, no focal deficits, symmetric strength 5/5 in the upper and lower extremities bilaterally  Psychiatric: Normal Affect          -------------------------------------------------- RESULTS -------------------------------------------------  Results are listed below.      LABS: (Lab results interpreted by me)  Results for orders placed or performed during the hospital encounter of 03/08/23   CBC with Auto Differential   Result Value Ref Range    WBC 3.5 (L) 4.5 - 11.5 E9/L    RBC 2.31 (L) 3.50 - 5.50 E12/L    Hemoglobin 8.2 (L) 11.5 - 15.5 g/dL    Hematocrit 26.6 (L) 34.0 - 48.0 %    .2 (H) 80.0 - 99.9 fL    MCH 35.5 (H) 26.0 - 35.0 pg    MCHC 30.8 (L) 32.0 - 34.5 %    RDW 15.4 (H) 11.5 - 15.0 fL    Platelets 619 (L) 624 - 450 E9/L    MPV 9.8 7.0 - 12.0 fL    Neutrophils % 67.8 43.0 - 80.0 %    Lymphocytes % 30.4 20.0 - 42.0 %    Monocytes % 1.7 (L) 2.0 - 12.0 %    Eosinophils % 0.9 0.0 - 6.0 %    Basophils % 0.6 0.0 - 2.0 %    Neutrophils Absolute 2.38 1.80 - 7.30 E9/L    Lymphocytes Absolute 1.05 (L) 1.50 - 4.00 E9/L    Monocytes Absolute 0.07 (L) 0.10 - 0.95 E9/L    Eosinophils Absolute 0.00 (L) 0.05 - 0.50 E9/L    Basophils Absolute 0.00 0.00 - 0.20 E9/L    Polychromasia 2+    CMP   Result Value Ref Range    Sodium 136 132 - 146 mmol/L    Potassium 4.0 3.5 - 5.0 mmol/L    Chloride 94 (L) 98 - 107 mmol/L    CO2 33 (H) 22 - 29 mmol/L    Anion Gap 9 7 - 16 mmol/L    Glucose 103 (H) 74 - 99 mg/dL    BUN 15 6 - 23 mg/dL    Creatinine 4.2 (H) 0.5 - 1.0 mg/dL    Est, Glom Filt Rate 12 >=60 mL/min/1.73    Calcium 9.2 8.6 - 10.2 mg/dL    Total Protein 6.9 6.4 - 8.3 g/dL    Albumin 3.9 3.5 - 5.2 g/dL    Total Bilirubin 1.1 0.0 - 1.2 mg/dL    Alkaline Phosphatase 46 35 - 104 U/L    ALT 18 0 - 32 U/L    AST 29 0 - 31 U/L   Lipase   Result Value Ref Range    Lipase 16 13 - 60 U/L   Lactic Acid   Result Value Ref Range    Lactic Acid 1.1 0.5 - 2.2 mmol/L   ,       RADIOLOGY:    Radiologist interpretation  XR ACUTE ABD SERIES CHEST 1 VW   Final Result   Diffuse colonic fecal retention.                 ------------------------- NURSING NOTES AND VITALS REVIEWED ---------------------------   The nursing notes within the ED encounter and vital signs as below have been reviewed by myself  /63   Pulse 71   Temp 98.5 °F (36.9 °C) (Oral)   Resp 20   SpO2 95%     Oxygen Saturation Interpretation: Normal    The patients available past medical records and past encounters were reviewed.         ------------------------------ ED COURSE/MEDICAL DECISION MAKING----------------------           Dr. Jose HERNANDEZ, am the primary provider of record        Medical Decision Making:     History obtained from the patient    External records - reviewed op note from 2.4.21.     Comorbidity -cirrhosis of the liver, diabetes, chronic renal failure    While not exhaustive, the following diagnoses and their severity were considered: SBO, electrolyte abnormality, constipation    Independent interpretation of Laboratory tests by Ludy Ramirez MD: Hemoglobin of 8.2, creatinine 4.2    Results for orders placed or performed during the hospital encounter of 03/08/23   CBC with Auto Differential   Result Value Ref Range    WBC 3.5 (L) 4.5 - 11.5 E9/L    RBC 2.31 (L) 3.50 - 5.50 E12/L    Hemoglobin 8.2 (L) 11.5 - 15.5 g/dL    Hematocrit 26.6 (L) 34.0 - 48.0 %    .2 (H) 80.0 - 99.9 fL    MCH 35.5 (H) 26.0 - 35.0 pg    MCHC 30.8 (L) 32.0 - 34.5 %    RDW 15.4 (H) 11.5 - 15.0 fL    Platelets 103 (L) 130 - 450 E9/L    MPV 9.8 7.0 - 12.0 fL    Neutrophils % 67.8 43.0 - 80.0 %    Lymphocytes % 30.4 20.0 - 42.0 %    Monocytes % 1.7 (L) 2.0 - 12.0 %    Eosinophils % 0.9 0.0 - 6.0 %    Basophils % 0.6 0.0 - 2.0 %    Neutrophils Absolute 2.38 1.80 - 7.30 E9/L    Lymphocytes Absolute 1.05 (L) 1.50 - 4.00 E9/L    Monocytes Absolute 0.07 (L) 0.10 - 0.95 E9/L    Eosinophils Absolute 0.00 (L) 0.05 - 0.50 E9/L    Basophils Absolute 0.00 0.00 - 0.20 E9/L    Polychromasia 2+    CMP   Result Value Ref Range    Sodium 136 132 - 146 mmol/L    Potassium 4.0 3.5 - 5.0 mmol/L    Chloride 94 (L) 98 - 107 mmol/L    CO2 33 (H) 22 - 29 mmol/L    Anion Gap 9 7 - 16 mmol/L    Glucose 103 (H) 74 - 99 mg/dL    BUN 15 6 - 23 mg/dL    Creatinine 4.2 (H) 0.5 - 1.0 mg/dL    Est, Glom Filt Rate 12 >=60 mL/min/1.73    Calcium 9.2 8.6 - 10.2 mg/dL    Total Protein 6.9 6.4 - 8.3 g/dL    Albumin 3.9 3.5 - 5.2 g/dL    Total Bilirubin 1.1 0.0 - 1.2 mg/dL    Alkaline Phosphatase 46 35 - 104 U/L    ALT 18 0 - 32 U/L    AST 29 0 - 31 U/L   Lipase   Result Value Ref Range    Lipase 16 13 - 60 U/L   Lactic Acid   Result Value Ref Range    Lactic Acid 1.1 0.5 - 2.2 mmol/L  Independent interpretation of Radiology tests by Mariana Hernandez MD: consitpation     Final Interpretation by Radiology:  XR ACUTE ABD SERIES CHEST 1 VW   Final Result   Diffuse colonic fecal retention. Are there any additional factors to consider that affect care (uninsured, homeless, illiterate, history from another source, etc.) (If yes, which ones). No      Name and Route of medications administered in the ED:  Medications - No data to display        ED Course: This patient's ED course included: a personal history and physicial examination and re-evaluation prior to disposition    This patient has remained hemodynamically stable during their ED course. Physical exam is largely unremarkable. No reproducible tenderness to palpation of the abdomen. Patient states that she just feels full. She just had a dialysis session today. Labs were obtained. They appear to be at her baseline. X-ray was obtained. No evidence of small bowel obstruction. Diffuse colonic retention was seen. Soapsuds enema was ordered. Patient had 2 soap suds enemas. Did have a small bowel movement. Says that still feels constipated. Discussed disimpaction with her. She declines. Says that she'll try over the counter treatment and continue her senokot. Vitals improved. I told her to follow up with Dr. Alec Weaver this week, to take her medication prescribed as directed, and to return for worsening symptoms. She's agreeable with the shared decision making. Re-Evaluations:       Feeling better    Consultations:  none      Critical Care: none    Counseling: The emergency provider has spoken with the patient and discussed todays results, in addition to providing specific details for the plan of care and counseling regarding the diagnosis and prognosis.   Questions are answered at this time and they are agreeable with the plan.       --------------------------------- IMPRESSION AND DISPOSITION ---------------------------------    IMPRESSION  1. Constipation, unspecified constipation type        DISPOSITION  Disposition: Discharge to home  Patient condition is stable        NOTE: This report was transcribed using voice recognition software.  Every effort was made to ensure accuracy; however, inadvertent computerized transcription errors may be present        Vishnu Chavez MD  03/08/23 0498

## 2023-03-09 NOTE — ED NOTES
Pt.  Moved to room # 24 with 02 at 4L n/c and the call light. Given a sandwich per request. Awaiting medical transport.      Meme Crawford RN  03/08/23 [id number]

## 2023-03-13 ENCOUNTER — TELEPHONE (OUTPATIENT)
Dept: VASCULAR SURGERY | Age: 63
End: 2023-03-13

## 2023-03-14 ENCOUNTER — OFFICE VISIT (OUTPATIENT)
Dept: VASCULAR SURGERY | Age: 63
End: 2023-03-14
Payer: MEDICARE

## 2023-03-14 DIAGNOSIS — Z99.2 ENCOUNTER REGARDING VASCULAR ACCESS FOR DIALYSIS FOR ESRD (HCC): Primary | ICD-10-CM

## 2023-03-14 DIAGNOSIS — N18.6 ENCOUNTER REGARDING VASCULAR ACCESS FOR DIALYSIS FOR ESRD (HCC): Primary | ICD-10-CM

## 2023-03-14 PROCEDURE — 99213 OFFICE O/P EST LOW 20 MIN: CPT

## 2023-03-14 NOTE — PROGRESS NOTES
Vascular Surgery Outpatient Progress note      Chief Complaint   Patient presents with    Circulatory Problem     Arterial alarms with dialysis. HISTORY OF PRESENT ILLNESS:                The patient is a 63 y.o. female who is following up in regards to her left loop graft. Pt had the graft placed in 2/2021. She states she has had at least 5 procedures done at the access center since the graft has been placed. Pt states her BP does run low at times and she needs to take midodrine. There was a stent placed at the graft vein anastomosis during one of the procedures at the access center. Pt has had 3 procedures done on the graft in 1/2023 at the access center. The first two times they dilated an outflow and an inflow stenotic lesion. The third procedure showed a widely patent inflow and outflow with no stenotic lesions. It was determined that the graft was as good as it will get. She is referred back to us for further evaluation of the graft. Past Medical History:        Diagnosis Date    Arthritis     Cirrhosis of liver ([address]     Depression     Diabetes mellitus ([address]     Eye abnormalities     blood behid retina    Hernia of abdominal wall     Hypertension     Neuropathy      Past Surgical History:        Procedure Laterality Date    CHOLECYSTECTOMY      EYE SURGERY      Bilateral eye \"old blood taken out about 2 yes ago 2019\"    FRACTURE SURGERY      HYSTERECTOMY (CERVIX STATUS UNKNOWN)      LIVER TRANSPLANT  2015    SHOULDER SURGERY Right     SHUNT REVISION Left 2/4/2021    AV GRAFT LEFT ARM performed by Deovnte Fuchs MD at [address] EXTRACTION       Current Medications:   Prior to Admission medications    Medication Sig Start Date End Date Taking?  Authorizing Provider   rOPINIRole (REQUIP) 0.5 MG tablet Take 1 tablet by mouth every morning (before breakfast) 12/18/20  Yes Historical Provider, MD   OXYGEN Inhale 3 L/min into the lungs continuous   Yes Historical Provider, MD   senna (SENOKOT) 8.6 MG TABS tablet  11/20/20  Yes Historical Provider, MD   tacrolimus (PROGRAF) 1 MG capsule TAKE THREE (3) CAPSULES BY MOUTH TWICE DAILY 11/18/20  Yes Historical Provider, MD   citalopram (CELEXA) 40 MG tablet Take 40 mg by mouth daily. Yes Historical Provider, MD   lisinopril (PRINIVIL;ZESTRIL) 5 MG tablet Take 5 mg by mouth daily. Yes Historical Provider, MD   pantoprazole (PROTONIX) 40 MG tablet Take 40 mg by mouth daily. Yes Historical Provider, MD   aspirin 81 MG chewable tablet Take 81 mg by mouth daily  Patient not taking: Reported on 3/14/2023 8/28/20   Historical Provider, MD   entecavir (BARACLUDE) 0.5 MG tablet take 1 tablet by mouth every 48 hours  Patient not taking: Reported on 3/14/2023 12/18/20   Historical Provider, MD   gabapentin (NEURONTIN) 300 MG capsule Take 300 mg by mouth 2 times daily. Patient not taking: Reported on 3/14/2023 12/18/20   Historical Provider, MD   hydrALAZINE (APRESOLINE) 100 MG tablet Take 100 mg by mouth 2 times daily  Patient not taking: Reported on 3/14/2023 12/18/20   Historical Provider, MD   Nelfinavir Mesylate (VIRACEPT PO) Take by mouth  Patient not taking: Reported on 3/14/2023    Historical Provider, MD   predniSONE (DELTASONE) 1 MG tablet Take 2 mg by mouth daily  Patient not taking: Reported on 3/14/2023    Historical Provider, MD   hydrochlorothiazide (MICROZIDE) 12.5 MG capsule Take 12.5 mg by mouth daily  Patient not taking: Reported on 3/14/2023    Historical Provider, MD   furosemide (LASIX) 40 MG tablet Take 40 mg by mouth daily. Patient not taking: Reported on 3/14/2023    Historical Provider, MD   hydrOXYzine (ATARAX) 25 MG tablet Take 25 mg by mouth 3 times daily as needed. Patient not taking: Reported on 3/14/2023    Historical Provider, MD     Allergies:  Tape [place] tape]    Social History     Socioeconomic History    Marital status:       Spouse name: Not on file    Number of children: Not on file    Years of education: Not on file    Highest education level: Not on file   Occupational History    Not on file   Tobacco Use    Smoking status: Former     Packs/day: 0.50     Types: Cigarettes     Quit date: 2019     Years since quittin.1    Smokeless tobacco: Never   Vaping Use    Vaping Use: Never used   Substance and Sexual Activity    Alcohol use: No    Drug use: Never    Sexual activity: Not on file   Other Topics Concern    Not on file   Social History Narrative    Not on file     Social Determinants of Health     Financial Resource Strain: Not on file   Food Insecurity: Not on file   Transportation Needs: Not on file   Physical Activity: Not on file   Stress: Not on file   Social Connections: Not on file   Intimate Partner Violence: Not on file   Housing Stability: Not on file        No family history on file.     REVIEW OF SYSTEMS (New symptoms):    Eyes:      Blurred vision:  No [x]/Yes []               Diplopia:   No [x]/Yes []               Vision loss:       No [x]/Yes []   Ears, nose, throat:             Hearing loss:    No [x]/Yes []      Vertigo:   No [x]/Yes []                       Swallowing problem:  No [x]/Yes []               Nose bleeds:   No [x]/Yes []      Voice hoarseness:  No [x]/Yes []  Respiratory:             Cough:   No [x]/Yes []      Pleuritic chest pain:  No [x]/Yes []                        Dyspnea:   No [x]/Yes []      Wheezing:   No [x]/Yes []  Cardiovascular:             Angina:   No [x]/Yes []      Palpitations:   No [x]/Yes []          Claudication:    No [x]/Yes []      Leg swelling:   No [x]/Yes []  Gastrointestinal:             Nausea or vomiting:  No [x]/Yes []               Abdominal pain:  No [x]/Yes []                     Intestinal bleeding: No [x]/Yes []  Musculoskeletal:             Leg pain:   No [x]/Yes []      Back pain:   No [x]/Yes []                    Weakness:   No [x]/Yes []  Neurologic:             Numbness:   No [x]/Yes []      Paralysis:   No [x]/Yes [] Headaches:   No [x]/Yes []  Hematologic, lymphatic:   Anemia:   No [x]/Yes []              Bleeding or bruising:  No [x]/Yes []              Fevers or chills: No [x]/Yes []  Endocrine:             Temp intolerance:   No [x]/Yes []                       Polydipsia, polyuria:  No [x]/Yes []  Skin:              Rash:    No [x]/Yes []      Ulcers:   No [x]/Yes []              Abnorm pigment: No [x]/Yes []  :              Frequency/urgency:  No [x]/Yes []      Hematuria:    No [x]/Yes []                      Incontinence:    No [x]/Yes []    PHYSICAL EXAM:  There were no vitals filed for this visit. General Appearance: alert and oriented to person, place and time, well developed and well- nourished, in no acute distress  Skin: warm and dry, no rash or erythema  Head: normocephalic and atraumatic  Eyes: extraocular eye movements intact, conjunctivae normal  ENT: external ear and ear canal normal bilaterally, nose without deformity  Pulmonary/Chest: clear to auscultation bilaterally- no wheezes, rales or rhonchi, normal air movement, no respiratory distress  Cardiovascular: normal rate, regular rhythm, normal S1 and S2, no murmurs, no carotid bruits  Abdomen: soft, non-tender, non-distended, normal bowel sounds, no masses or organomegaly  Musculoskeletal: normal range of motion, no joint swelling, deformity or tenderness  Neurologic: no cranial nerve deficit, gait, coordination and speech normal  Extremities: no leg edema bilaterally    PULSE EXAM      Right      Left   Brachial     Radial  2   Femoral     Popliteal     Dorsalis Pedis     Posterior Tibial     (3=normal, 2=diminished, 1=barely palpable, 4=widened)      Problem List Items Addressed This Visit          Genitourinary    Encounter regarding vascular access for dialysis for ESRD ([facility] [address] - Primary     Pt referred back to us because of continued problems with her AVG.   She had a stent placed at the graft vein anastomosis during one of the procedures at the access center. She has had multiple interventions done there. She was advised to continue using the graft. We will plan on a left upper fistulogram in the near future to further evaluate the graft.     Pt seen and plan reviewed with NELSON Ashby - CNP

## 2023-03-28 ENCOUNTER — TELEPHONE (OUTPATIENT)
Dept: CARDIAC CATH/INVASIVE PROCEDURES | Age: 63
End: 2023-03-28

## 2023-03-28 NOTE — H&P
Rfl:     Allergies:  Tape Dorothyann No tape]    Social History     Socioeconomic History    Marital status:      Spouse name: Not on file    Number of children: Not on file    Years of education: Not on file    Highest education level: Not on file   Occupational History    Not on file   Tobacco Use    Smoking status: Former     Packs/day: 0.50     Types: Cigarettes     Quit date: 2019     Years since quittin.1    Smokeless tobacco: Never   Vaping Use    Vaping Use: Never used   Substance and Sexual Activity    Alcohol use: No    Drug use: Never    Sexual activity: Not on file   Other Topics Concern    Not on file   Social History Narrative    Not on file     Social Determinants of Health     Financial Resource Strain: Not on file   Food Insecurity: Not on file   Transportation Needs: Not on file   Physical Activity: Not on file   Stress: Not on file   Social Connections: Not on file   Intimate Partner Violence: Not on file   Housing Stability: Not on file        No family history on file. REVIEW OF SYSTEMS:  The chart was reviewed. PHYSICAL EXAM:    There were no vitals filed for this visit.   CONSTITUTIONAL:  awake, alert, cooperative, no apparent distress, and appears stated age  NECK:  supple, symmetrical, trachea midline  LUNGS:  no increased work of breathing, good air exchange  CARDIOVASCULAR:  regular rate and rhythm   ABDOMEN:  soft, non-distended and non-tender    LABS:    Lab Results   Component Value Date    WBC 3.5 (L) 2023    HGB 8.2 (L) 2023    HCT 26.6 (L) 2023     (L) 2023    PROTIME 16.8 (H) 2013    INR 2.0 2013    K 4.0 2023    BUN 15 2023    CREATININE 4.2 (H) 2023

## 2023-03-29 ENCOUNTER — HOSPITAL ENCOUNTER (OUTPATIENT)
Dept: CARDIAC CATH/INVASIVE PROCEDURES | Age: 63
Discharge: HOME | End: 2023-03-29
Payer: MEDICARE

## 2023-03-29 VITALS
WEIGHT: 207 LBS | OXYGEN SATURATION: 96 % | SYSTOLIC BLOOD PRESSURE: 133 MMHG | DIASTOLIC BLOOD PRESSURE: 60 MMHG | HEIGHT: 63 IN | TEMPERATURE: 98.4 F | BODY MASS INDEX: 36.68 KG/M2 | HEART RATE: 67 BPM | RESPIRATION RATE: 20 BRPM

## 2023-03-29 PROCEDURE — 36901 INTRO CATH DIALYSIS CIRCUIT: CPT | Performed by: SURGERY

## 2023-03-29 PROCEDURE — 2709999900 HC NON-CHARGEABLE SUPPLY

## 2023-03-29 PROCEDURE — C1725 CATH, TRANSLUMIN NON-LASER: HCPCS

## 2023-03-29 PROCEDURE — C1769 GUIDE WIRE: HCPCS

## 2023-03-29 PROCEDURE — 6360000002 HC RX W HCPCS

## 2023-03-29 PROCEDURE — 2500000003 HC RX 250 WO HCPCS

## 2023-03-29 PROCEDURE — C1894 INTRO/SHEATH, NON-LASER: HCPCS

## 2023-03-29 PROCEDURE — 36902 INTRO CATH DIALYSIS CIRCUIT: CPT

## 2023-03-29 PROCEDURE — 36907 BALO ANGIOP CTR DIALYSIS SEG: CPT | Performed by: SURGERY

## 2023-03-29 RX ORDER — SODIUM CHLORIDE 0.9 % (FLUSH) 0.9 %
5-40 SYRINGE (ML) INJECTION EVERY 12 HOURS SCHEDULED
Status: DISCONTINUED | OUTPATIENT
Start: 2023-03-29 | End: 2023-03-30 | Stop reason: HOSPADM

## 2023-03-29 RX ORDER — SODIUM CHLORIDE 0.9 % (FLUSH) 0.9 %
5-40 SYRINGE (ML) INJECTION PRN
Status: DISCONTINUED | OUTPATIENT
Start: 2023-03-29 | End: 2023-03-30 | Stop reason: HOSPADM

## 2023-03-29 RX ORDER — SODIUM CHLORIDE 9 MG/ML
INJECTION, SOLUTION INTRAVENOUS PRN
Status: DISCONTINUED | OUTPATIENT
Start: 2023-03-29 | End: 2023-03-30 | Stop reason: HOSPADM

## 2023-03-29 NOTE — PROCEDURES
Wong Portillo  1960    Cardiovascular Lab    DATE OF PROCEDURE: 3/29/2023     PHYSICIAN: Sherrill Shaw M.D.     ASSISTANT:      PRE-PROCEDURE DIAGNOSIS: Chronic renal failure, stage 5, problem with dialysis access [T84.539O]. POST-PROCEDURE DIAGNOSIS: Same    PROCEDURE: Left arteriovenous fistulogram with angioplasty     ANESTHESIA: Local     ESTIMATED BLOOD LOSS: Minimal     COMPLICATIONS: None    DESCRIPTION OF PROCEDURE: The patient was identified and the procedure was confirmed. The left arm was prepped and draped in the usual sterile fashion. Lidocaine 1% for local anesthesia. A micropuncture catheter was inserted into the vein. Serial images were obtained. The images identified a patent venous loop of the fistula with a patent stent graft. In the upper arm, there was a high-grade stenosis in the axillary artery where a large branch was located. The catheter was exchanged for a 6-[place] sheath. A 9 then 10 mm x 4 cm balloon was used to dilate the stenosis with an no significant residual stenosis. Much improved flow was appreciated through the graft and the graft was significantly less pulsatile  The sheath was removed and a nylon suture was placed to obtain hemostasis. A sterile dressing was applied to the puncture site. The patient tolerated the procedure and was transferred to the recovery area in satisfactory condition.

## 2023-03-29 NOTE — DISCHARGE INSTRUCTIONS
appropriate treatment options. In some cases, the results can indicate an immediate need for surgery. Schedule a follow-up appointment as directed by your doctor. Call Your Doctor If Any of the Following Occurs   Signs of infection, including fever and chills    Redness, swelling, increasing pain, excessive bleeding, or any discharge from the injection site    Extreme sweating, nausea, or vomiting    Extreme pain, including chest pain    Arm feels cold, turns white or blue, or becomes numb or tingly    Difficulty breathing    Any problems with your speech or vision    Facial weakness      In case of an emergency, call 911 immediately.

## 2023-06-02 ENCOUNTER — APPOINTMENT (OUTPATIENT)
Dept: CT IMAGING | Age: 63
DRG: 280 | End: 2023-06-02
Payer: MEDICARE

## 2023-06-02 ENCOUNTER — APPOINTMENT (OUTPATIENT)
Dept: GENERAL RADIOLOGY | Age: 63
DRG: 280 | End: 2023-06-02
Payer: MEDICARE

## 2023-06-02 ENCOUNTER — HOSPITAL ENCOUNTER (INPATIENT)
Age: 63
LOS: 4 days | Discharge: ANOTHER HOSPITAL | DRG: 280 | End: 2023-06-06
Attending: EMERGENCY MEDICINE | Admitting: INTERNAL MEDICINE
Payer: MEDICARE

## 2023-06-02 DIAGNOSIS — E83.51 HYPOCALCEMIA: ICD-10-CM

## 2023-06-02 DIAGNOSIS — R07.9 ACUTE CHEST PAIN: ICD-10-CM

## 2023-06-02 DIAGNOSIS — I21.4 NSTEMI (NON-ST ELEVATED MYOCARDIAL INFARCTION) (HCC): Primary | ICD-10-CM

## 2023-06-02 LAB
ALBUMIN SERPL-MCNC: 2.3 G/DL (ref 3.5–5.2)
ALP SERPL-CCNC: 29 U/L (ref 35–104)
ALT SERPL-CCNC: 13 U/L (ref 0–32)
ANION GAP SERPL CALCULATED.3IONS-SCNC: 12 MMOL/L (ref 7–16)
ANION GAP SERPL CALCULATED.3IONS-SCNC: 12 MMOL/L (ref 7–16)
ANISOCYTOSIS: ABNORMAL
APTT BLD: 167 SEC (ref 24.5–35.1)
APTT BLD: 28.9 SEC (ref 24.5–35.1)
AST SERPL-CCNC: 16 U/L (ref 0–31)
BASOPHILS # BLD: 0.02 E9/L (ref 0–0.2)
BASOPHILS NFR BLD: 0.7 % (ref 0–2)
BILIRUB SERPL-MCNC: 0.5 MG/DL (ref 0–1.2)
BUN SERPL-MCNC: 23 MG/DL (ref 6–23)
BUN SERPL-MCNC: 34 MG/DL (ref 6–23)
CA-I BLD-SCNC: 1.1 MMOL/L (ref 1.15–1.33)
CALCIUM SERPL-MCNC: 5.3 MG/DL (ref 8.6–10.2)
CALCIUM SERPL-MCNC: 8 MG/DL (ref 8.6–10.2)
CHLORIDE SERPL-SCNC: 110 MMOL/L (ref 98–107)
CHLORIDE SERPL-SCNC: 95 MMOL/L (ref 98–107)
CO2 SERPL-SCNC: 15 MMOL/L (ref 22–29)
CO2 SERPL-SCNC: 25 MMOL/L (ref 22–29)
CREAT SERPL-MCNC: 4.4 MG/DL (ref 0.5–1)
CREAT SERPL-MCNC: 7 MG/DL (ref 0.5–1)
EOSINOPHIL # BLD: 0.05 E9/L (ref 0.05–0.5)
EOSINOPHIL NFR BLD: 1.7 % (ref 0–6)
ERYTHROCYTE [DISTWIDTH] IN BLOOD BY AUTOMATED COUNT: 13.6 FL (ref 11.5–15)
ERYTHROCYTE [DISTWIDTH] IN BLOOD BY AUTOMATED COUNT: 13.7 FL (ref 11.5–15)
GLUCOSE SERPL-MCNC: 103 MG/DL (ref 74–99)
GLUCOSE SERPL-MCNC: 69 MG/DL (ref 74–99)
HCT VFR BLD AUTO: 29.5 % (ref 34–48)
HCT VFR BLD AUTO: 30 % (ref 34–48)
HGB BLD-MCNC: 10 G/DL (ref 11.5–15.5)
HGB BLD-MCNC: 9.7 G/DL (ref 11.5–15.5)
IMM GRANULOCYTES # BLD: 0.01 E9/L
IMM GRANULOCYTES NFR BLD: 0.3 % (ref 0–5)
INFLUENZA A BY PCR: NOT DETECTED
INFLUENZA B BY PCR: NOT DETECTED
LYMPHOCYTES # BLD: 0.8 E9/L (ref 1.5–4)
LYMPHOCYTES NFR BLD: 27.3 % (ref 20–42)
MCH RBC QN AUTO: 34 PG (ref 26–35)
MCH RBC QN AUTO: 34.2 PG (ref 26–35)
MCHC RBC AUTO-ENTMCNC: 32.9 % (ref 32–34.5)
MCHC RBC AUTO-ENTMCNC: 33.3 % (ref 32–34.5)
MCV RBC AUTO: 102.7 FL (ref 80–99.9)
MCV RBC AUTO: 103.5 FL (ref 80–99.9)
MONOCYTES # BLD: 0.17 E9/L (ref 0.1–0.95)
MONOCYTES NFR BLD: 5.8 % (ref 2–12)
NEUTROPHILS # BLD: 1.88 E9/L (ref 1.8–7.3)
NEUTS SEG NFR BLD: 64.2 % (ref 43–80)
OVALOCYTES: ABNORMAL
PH VENOUS: 7.44 (ref 7.35–7.45)
PLATELET # BLD AUTO: 58 E9/L (ref 130–450)
PLATELET # BLD AUTO: 71 E9/L (ref 130–450)
PLATELET CONFIRMATION: NORMAL
PLATELET CONFIRMATION: NORMAL
PMV BLD AUTO: 10 FL (ref 7–12)
PMV BLD AUTO: 10.1 FL (ref 7–12)
POIKILOCYTES: ABNORMAL
POTASSIUM SERPL-SCNC: 3.3 MMOL/L (ref 3.5–5)
POTASSIUM SERPL-SCNC: 5.1 MMOL/L (ref 3.5–5)
PROT SERPL-MCNC: 3.9 G/DL (ref 6.4–8.3)
RBC # BLD AUTO: 2.85 E12/L (ref 3.5–5.5)
RBC # BLD AUTO: 2.92 E12/L (ref 3.5–5.5)
REASON FOR REJECTION: NORMAL
REJECTED TEST: NORMAL
SARS-COV-2 RDRP RESP QL NAA+PROBE: NOT DETECTED
SODIUM SERPL-SCNC: 132 MMOL/L (ref 132–146)
SODIUM SERPL-SCNC: 137 MMOL/L (ref 132–146)
TEAR DROP CELLS: ABNORMAL
TROPONIN, HIGH SENSITIVITY: 35 NG/L (ref 0–9)
TROPONIN, HIGH SENSITIVITY: 47 NG/L (ref 0–9)
TROPONIN, HIGH SENSITIVITY: 49 NG/L (ref 0–9)
WBC # BLD: 1.8 E9/L (ref 4.5–11.5)
WBC # BLD: 2.9 E9/L (ref 4.5–11.5)

## 2023-06-02 PROCEDURE — 71045 X-RAY EXAM CHEST 1 VIEW: CPT

## 2023-06-02 PROCEDURE — 71275 CT ANGIOGRAPHY CHEST: CPT

## 2023-06-02 PROCEDURE — 6360000002 HC RX W HCPCS: Performed by: STUDENT IN AN ORGANIZED HEALTH CARE EDUCATION/TRAINING PROGRAM

## 2023-06-02 PROCEDURE — 80048 BASIC METABOLIC PNL TOTAL CA: CPT

## 2023-06-02 PROCEDURE — 6360000002 HC RX W HCPCS

## 2023-06-02 PROCEDURE — 82330 ASSAY OF CALCIUM: CPT

## 2023-06-02 PROCEDURE — 80053 COMPREHEN METABOLIC PANEL: CPT

## 2023-06-02 PROCEDURE — 82800 BLOOD PH: CPT

## 2023-06-02 PROCEDURE — 6370000000 HC RX 637 (ALT 250 FOR IP)

## 2023-06-02 PROCEDURE — 84484 ASSAY OF TROPONIN QUANT: CPT

## 2023-06-02 PROCEDURE — 1200000000 HC SEMI PRIVATE

## 2023-06-02 PROCEDURE — 94640 AIRWAY INHALATION TREATMENT: CPT

## 2023-06-02 PROCEDURE — 85027 COMPLETE CBC AUTOMATED: CPT

## 2023-06-02 PROCEDURE — 6370000000 HC RX 637 (ALT 250 FOR IP): Performed by: EMERGENCY MEDICINE

## 2023-06-02 PROCEDURE — 93005 ELECTROCARDIOGRAM TRACING: CPT

## 2023-06-02 PROCEDURE — 85025 COMPLETE CBC W/AUTO DIFF WBC: CPT

## 2023-06-02 PROCEDURE — 87502 INFLUENZA DNA AMP PROBE: CPT

## 2023-06-02 PROCEDURE — 36415 COLL VENOUS BLD VENIPUNCTURE: CPT

## 2023-06-02 PROCEDURE — 6360000004 HC RX CONTRAST MEDICATION: Performed by: RADIOLOGY

## 2023-06-02 PROCEDURE — 85730 THROMBOPLASTIN TIME PARTIAL: CPT

## 2023-06-02 PROCEDURE — 99285 EMERGENCY DEPT VISIT HI MDM: CPT

## 2023-06-02 PROCEDURE — 6360000002 HC RX W HCPCS: Performed by: INTERNAL MEDICINE

## 2023-06-02 PROCEDURE — 87635 SARS-COV-2 COVID-19 AMP PRB: CPT

## 2023-06-02 PROCEDURE — 6370000000 HC RX 637 (ALT 250 FOR IP): Performed by: INTERNAL MEDICINE

## 2023-06-02 RX ORDER — ALBUTEROL SULFATE 90 UG/1
1 AEROSOL, METERED RESPIRATORY (INHALATION) 2 TIMES DAILY
COMMUNITY

## 2023-06-02 RX ORDER — PANTOPRAZOLE SODIUM 40 MG/1
40 TABLET, DELAYED RELEASE ORAL DAILY
Status: DISCONTINUED | OUTPATIENT
Start: 2023-06-02 | End: 2023-06-06 | Stop reason: HOSPADM

## 2023-06-02 RX ORDER — LISINOPRIL 10 MG/1
5 TABLET ORAL DAILY
Status: DISCONTINUED | OUTPATIENT
Start: 2023-06-02 | End: 2023-06-02

## 2023-06-02 RX ORDER — PREDNISONE 1 MG/1
2 TABLET ORAL DAILY
Status: DISCONTINUED | OUTPATIENT
Start: 2023-06-02 | End: 2023-06-02

## 2023-06-02 RX ORDER — CALCIUM GLUCONATE 20 MG/ML
2000 INJECTION, SOLUTION INTRAVENOUS ONCE
Status: COMPLETED | OUTPATIENT
Start: 2023-06-02 | End: 2023-06-02

## 2023-06-02 RX ORDER — HEPARIN SODIUM 1000 [USP'U]/ML
4000 INJECTION, SOLUTION INTRAVENOUS; SUBCUTANEOUS PRN
Status: DISCONTINUED | OUTPATIENT
Start: 2023-06-02 | End: 2023-06-06 | Stop reason: HOSPADM

## 2023-06-02 RX ORDER — FLUTICASONE PROPIONATE AND SALMETEROL XINAFOATE 115; 21 UG/1; UG/1
2 AEROSOL, METERED RESPIRATORY (INHALATION) 2 TIMES DAILY
COMMUNITY

## 2023-06-02 RX ORDER — GABAPENTIN 300 MG/1
300 CAPSULE ORAL 2 TIMES DAILY
Status: DISCONTINUED | OUTPATIENT
Start: 2023-06-02 | End: 2023-06-02

## 2023-06-02 RX ORDER — TACROLIMUS 1 MG/1
1 CAPSULE ORAL 2 TIMES DAILY
Status: DISCONTINUED | OUTPATIENT
Start: 2023-06-02 | End: 2023-06-04

## 2023-06-02 RX ORDER — FUROSEMIDE 40 MG/1
40 TABLET ORAL DAILY
Status: DISCONTINUED | OUTPATIENT
Start: 2023-06-02 | End: 2023-06-02

## 2023-06-02 RX ORDER — IPRATROPIUM BROMIDE AND ALBUTEROL SULFATE 2.5; .5 MG/3ML; MG/3ML
1 SOLUTION RESPIRATORY (INHALATION) ONCE
Status: COMPLETED | OUTPATIENT
Start: 2023-06-02 | End: 2023-06-02

## 2023-06-02 RX ORDER — ARFORMOTEROL TARTRATE 15 UG/2ML
15 SOLUTION RESPIRATORY (INHALATION) 2 TIMES DAILY
Status: DISCONTINUED | OUTPATIENT
Start: 2023-06-02 | End: 2023-06-04

## 2023-06-02 RX ORDER — ASPIRIN 81 MG/1
81 TABLET, CHEWABLE ORAL DAILY
Status: DISCONTINUED | OUTPATIENT
Start: 2023-06-02 | End: 2023-06-02

## 2023-06-02 RX ORDER — HEPARIN SODIUM 10000 [USP'U]/100ML
5-30 INJECTION, SOLUTION INTRAVENOUS CONTINUOUS
Status: DISCONTINUED | OUTPATIENT
Start: 2023-06-02 | End: 2023-06-06 | Stop reason: HOSPADM

## 2023-06-02 RX ORDER — BUDESONIDE 0.5 MG/2ML
0.5 INHALANT ORAL 2 TIMES DAILY
Status: DISCONTINUED | OUTPATIENT
Start: 2023-06-02 | End: 2023-06-05

## 2023-06-02 RX ORDER — HYDRALAZINE HYDROCHLORIDE 25 MG/1
100 TABLET, FILM COATED ORAL 2 TIMES DAILY
Status: DISCONTINUED | OUTPATIENT
Start: 2023-06-02 | End: 2023-06-02

## 2023-06-02 RX ORDER — HEPARIN SODIUM 1000 [USP'U]/ML
2000 INJECTION, SOLUTION INTRAVENOUS; SUBCUTANEOUS PRN
Status: DISCONTINUED | OUTPATIENT
Start: 2023-06-02 | End: 2023-06-06 | Stop reason: HOSPADM

## 2023-06-02 RX ORDER — ROPINIROLE 0.5 MG/1
0.5 TABLET, FILM COATED ORAL
Status: DISCONTINUED | OUTPATIENT
Start: 2023-06-03 | End: 2023-06-02

## 2023-06-02 RX ORDER — HEPARIN SODIUM 1000 [USP'U]/ML
4000 INJECTION, SOLUTION INTRAVENOUS; SUBCUTANEOUS ONCE
Status: COMPLETED | OUTPATIENT
Start: 2023-06-02 | End: 2023-06-02

## 2023-06-02 RX ORDER — SODIUM CHLORIDE 0.9 % (FLUSH) 0.9 %
SYRINGE (ML) INJECTION
Status: DISPENSED
Start: 2023-06-02 | End: 2023-06-03

## 2023-06-02 RX ORDER — PROCHLORPERAZINE EDISYLATE 5 MG/ML
10 INJECTION INTRAMUSCULAR; INTRAVENOUS EVERY 6 HOURS PRN
Status: DISCONTINUED | OUTPATIENT
Start: 2023-06-02 | End: 2023-06-06 | Stop reason: HOSPADM

## 2023-06-02 RX ORDER — ENTECAVIR 1 MG/1
0.5 TABLET, FILM COATED ORAL EVERY OTHER DAY
Status: DISCONTINUED | OUTPATIENT
Start: 2023-06-02 | End: 2023-06-06 | Stop reason: HOSPADM

## 2023-06-02 RX ORDER — CITALOPRAM 10 MG/1
40 TABLET ORAL DAILY
Status: DISCONTINUED | OUTPATIENT
Start: 2023-06-02 | End: 2023-06-06 | Stop reason: HOSPADM

## 2023-06-02 RX ORDER — ACETAMINOPHEN 500 MG
500 TABLET ORAL EVERY 6 HOURS PRN
Status: DISCONTINUED | OUTPATIENT
Start: 2023-06-02 | End: 2023-06-06 | Stop reason: HOSPADM

## 2023-06-02 RX ORDER — ALBUTEROL SULFATE 2.5 MG/3ML
2.5 SOLUTION RESPIRATORY (INHALATION) 2 TIMES DAILY
Status: DISCONTINUED | OUTPATIENT
Start: 2023-06-02 | End: 2023-06-04

## 2023-06-02 RX ORDER — ROPINIROLE 1 MG/1
1 TABLET, FILM COATED ORAL 2 TIMES DAILY
Status: DISCONTINUED | OUTPATIENT
Start: 2023-06-02 | End: 2023-06-06 | Stop reason: HOSPADM

## 2023-06-02 RX ORDER — POLYETHYLENE GLYCOL 3350 17 G/17G
17 POWDER, FOR SOLUTION ORAL DAILY PRN
Status: DISCONTINUED | OUTPATIENT
Start: 2023-06-02 | End: 2023-06-06 | Stop reason: HOSPADM

## 2023-06-02 RX ORDER — BUDESONIDE AND FORMOTEROL FUMARATE DIHYDRATE 160; 4.5 UG/1; UG/1
2 AEROSOL RESPIRATORY (INHALATION) 2 TIMES DAILY
Status: DISCONTINUED | OUTPATIENT
Start: 2023-06-02 | End: 2023-06-02 | Stop reason: CLARIF

## 2023-06-02 RX ORDER — PRIMIDONE 50 MG/1
50 TABLET ORAL DAILY
COMMUNITY

## 2023-06-02 RX ORDER — FOLIC ACID/VIT B COMPLEX AND C 0.8 MG
1 TABLET ORAL DAILY
COMMUNITY

## 2023-06-02 RX ORDER — HYDROCHLOROTHIAZIDE 12.5 MG/1
12.5 CAPSULE, GELATIN COATED ORAL DAILY
Status: DISCONTINUED | OUTPATIENT
Start: 2023-06-02 | End: 2023-06-02

## 2023-06-02 RX ORDER — ONDANSETRON 4 MG/1
4 TABLET, ORALLY DISINTEGRATING ORAL ONCE
Status: COMPLETED | OUTPATIENT
Start: 2023-06-02 | End: 2023-06-02

## 2023-06-02 RX ORDER — LORAZEPAM 2 MG/ML
1 INJECTION INTRAMUSCULAR ONCE
Status: COMPLETED | OUTPATIENT
Start: 2023-06-02 | End: 2023-06-02

## 2023-06-02 RX ADMIN — HEPARIN SODIUM 10 UNITS/KG/HR: 10000 INJECTION, SOLUTION INTRAVENOUS at 13:47

## 2023-06-02 RX ADMIN — IPRATROPIUM BROMIDE AND ALBUTEROL SULFATE 1 DOSE: .5; 2.5 SOLUTION RESPIRATORY (INHALATION) at 09:03

## 2023-06-02 RX ADMIN — ENTECAVIR 0.5 MG: 1 TABLET, FILM COATED ORAL at 19:15

## 2023-06-02 RX ADMIN — TACROLIMUS 1 MG: 1 CAPSULE ORAL at 20:04

## 2023-06-02 RX ADMIN — PANTOPRAZOLE SODIUM 40 MG: 40 TABLET, DELAYED RELEASE ORAL at 18:45

## 2023-06-02 RX ADMIN — ARFORMOTEROL TARTRATE 15 MCG: 15 SOLUTION RESPIRATORY (INHALATION) at 19:37

## 2023-06-02 RX ADMIN — ROPINIROLE HYDROCHLORIDE 1 MG: 1 TABLET, FILM COATED ORAL at 20:06

## 2023-06-02 RX ADMIN — NITROGLYCERIN 0.5 INCH: 20 OINTMENT TOPICAL at 19:14

## 2023-06-02 RX ADMIN — BUDESONIDE INHALATION 500 MCG: 0.5 SUSPENSION RESPIRATORY (INHALATION) at 19:37

## 2023-06-02 RX ADMIN — CALCIUM GLUCONATE 2000 MG: 20 INJECTION, SOLUTION INTRAVENOUS at 13:45

## 2023-06-02 RX ADMIN — HEPARIN SODIUM 4000 UNITS: 1000 INJECTION, SOLUTION INTRAVENOUS; SUBCUTANEOUS at 13:36

## 2023-06-02 RX ADMIN — ALBUTEROL SULFATE 2.5 MG: 2.5 SOLUTION RESPIRATORY (INHALATION) at 19:37

## 2023-06-02 RX ADMIN — ACETAMINOPHEN 500 MG: 500 TABLET ORAL at 16:15

## 2023-06-02 RX ADMIN — ONDANSETRON 4 MG: 4 TABLET, ORALLY DISINTEGRATING ORAL at 09:38

## 2023-06-02 RX ADMIN — IOPAMIDOL 75 ML: 755 INJECTION, SOLUTION INTRAVENOUS at 10:41

## 2023-06-02 RX ADMIN — CITALOPRAM HYDROBROMIDE 40 MG: 20 TABLET ORAL at 18:45

## 2023-06-02 RX ADMIN — LORAZEPAM 1 MG: 2 INJECTION INTRAMUSCULAR; INTRAVENOUS at 22:22

## 2023-06-02 ASSESSMENT — LIFESTYLE VARIABLES
HOW MANY STANDARD DRINKS CONTAINING ALCOHOL DO YOU HAVE ON A TYPICAL DAY: PATIENT DOES NOT DRINK
HOW OFTEN DO YOU HAVE A DRINK CONTAINING ALCOHOL: NEVER

## 2023-06-02 ASSESSMENT — PAIN SCALES - GENERAL: PAINLEVEL_OUTOF10: 9

## 2023-06-03 PROBLEM — G25.1 DRUG-INDUCED TREMOR: Status: ACTIVE | Noted: 2023-06-03

## 2023-06-03 LAB
ALBUMIN SERPL-MCNC: 3.6 G/DL (ref 3.5–5.2)
ALP SERPL-CCNC: 46 U/L (ref 35–104)
ALT SERPL-CCNC: 21 U/L (ref 0–32)
ANION GAP SERPL CALCULATED.3IONS-SCNC: 13 MMOL/L (ref 7–16)
ANISOCYTOSIS: ABNORMAL
APTT BLD: 35.2 SEC (ref 24.5–35.1)
APTT BLD: 50.9 SEC (ref 24.5–35.1)
APTT BLD: 58.6 SEC (ref 24.5–35.1)
APTT BLD: 79.6 SEC (ref 24.5–35.1)
AST SERPL-CCNC: 24 U/L (ref 0–31)
BASOPHILS # BLD: 0.02 E9/L (ref 0–0.2)
BASOPHILS NFR BLD: 0.9 % (ref 0–2)
BILIRUB SERPL-MCNC: 0.8 MG/DL (ref 0–1.2)
BUN SERPL-MCNC: 39 MG/DL (ref 6–23)
CALCIUM SERPL-MCNC: 9.2 MG/DL (ref 8.6–10.2)
CHLORIDE SERPL-SCNC: 90 MMOL/L (ref 98–107)
CHOLESTEROL, TOTAL: 142 MG/DL (ref 0–199)
CO2 SERPL-SCNC: 29 MMOL/L (ref 22–29)
CREAT SERPL-MCNC: 8.3 MG/DL (ref 0.5–1)
EOSINOPHIL # BLD: 0.04 E9/L (ref 0.05–0.5)
EOSINOPHIL NFR BLD: 1.7 % (ref 0–6)
ERYTHROCYTE [DISTWIDTH] IN BLOOD BY AUTOMATED COUNT: 13.9 FL (ref 11.5–15)
GLUCOSE SERPL-MCNC: 83 MG/DL (ref 74–99)
HCT VFR BLD AUTO: 31 % (ref 34–48)
HDLC SERPL-MCNC: 61 MG/DL
HGB BLD-MCNC: 10 G/DL (ref 11.5–15.5)
LDLC SERPL CALC-MCNC: 66 MG/DL (ref 0–99)
LYMPHOCYTES # BLD: 0.58 E9/L (ref 1.5–4)
LYMPHOCYTES NFR BLD: 25.2 % (ref 20–42)
MAGNESIUM SERPL-MCNC: 2.1 MG/DL (ref 1.6–2.6)
MCH RBC QN AUTO: 33.3 PG (ref 26–35)
MCHC RBC AUTO-ENTMCNC: 32.3 % (ref 32–34.5)
MCV RBC AUTO: 103.3 FL (ref 80–99.9)
MONOCYTES # BLD: 0.18 E9/L (ref 0.1–0.95)
MONOCYTES NFR BLD: 7.8 % (ref 2–12)
NEUTROPHILS # BLD: 1.47 E9/L (ref 1.8–7.3)
NEUTS SEG NFR BLD: 64.4 % (ref 43–80)
NRBC BLD-RTO: 0.9 /100 WBC
OVALOCYTES: ABNORMAL
PHOSPHATE SERPL-MCNC: 7.6 MG/DL (ref 2.5–4.5)
PLATELET # BLD AUTO: 79 E9/L (ref 130–450)
PLATELET CONFIRMATION: NORMAL
PMV BLD AUTO: 11.5 FL (ref 7–12)
POIKILOCYTES: ABNORMAL
POTASSIUM SERPL-SCNC: 6 MMOL/L (ref 3.5–5)
PROT SERPL-MCNC: 6.4 G/DL (ref 6.4–8.3)
RBC # BLD AUTO: 3 E12/L (ref 3.5–5.5)
SODIUM SERPL-SCNC: 132 MMOL/L (ref 132–146)
TEAR DROP CELLS: ABNORMAL
TRIGL SERPL-MCNC: 75 MG/DL (ref 0–149)
TSH SERPL-MCNC: 1.14 UIU/ML (ref 0.27–4.2)
VLDLC SERPL CALC-MCNC: 15 MG/DL
WBC # BLD: 2.3 E9/L (ref 4.5–11.5)

## 2023-06-03 PROCEDURE — 6370000000 HC RX 637 (ALT 250 FOR IP): Performed by: INTERNAL MEDICINE

## 2023-06-03 PROCEDURE — 83735 ASSAY OF MAGNESIUM: CPT

## 2023-06-03 PROCEDURE — 1200000000 HC SEMI PRIVATE

## 2023-06-03 PROCEDURE — 99223 1ST HOSP IP/OBS HIGH 75: CPT | Performed by: INTERNAL MEDICINE

## 2023-06-03 PROCEDURE — 6360000002 HC RX W HCPCS

## 2023-06-03 PROCEDURE — 84100 ASSAY OF PHOSPHORUS: CPT

## 2023-06-03 PROCEDURE — 84443 ASSAY THYROID STIM HORMONE: CPT

## 2023-06-03 PROCEDURE — 90935 HEMODIALYSIS ONE EVALUATION: CPT | Performed by: INTERNAL MEDICINE

## 2023-06-03 PROCEDURE — 80061 LIPID PANEL: CPT

## 2023-06-03 PROCEDURE — 6360000002 HC RX W HCPCS: Performed by: INTERNAL MEDICINE

## 2023-06-03 PROCEDURE — 36415 COLL VENOUS BLD VENIPUNCTURE: CPT

## 2023-06-03 PROCEDURE — 6370000000 HC RX 637 (ALT 250 FOR IP)

## 2023-06-03 PROCEDURE — 85025 COMPLETE CBC W/AUTO DIFF WBC: CPT

## 2023-06-03 PROCEDURE — 85730 THROMBOPLASTIN TIME PARTIAL: CPT

## 2023-06-03 PROCEDURE — 80053 COMPREHEN METABOLIC PANEL: CPT

## 2023-06-03 PROCEDURE — 5A1D70Z PERFORMANCE OF URINARY FILTRATION, INTERMITTENT, LESS THAN 6 HOURS PER DAY: ICD-10-PCS | Performed by: INTERNAL MEDICINE

## 2023-06-03 PROCEDURE — 94640 AIRWAY INHALATION TREATMENT: CPT

## 2023-06-03 RX ORDER — ARFORMOTEROL TARTRATE 15 UG/2ML
15 SOLUTION RESPIRATORY (INHALATION) 2 TIMES DAILY
Status: DISCONTINUED | OUTPATIENT
Start: 2023-06-03 | End: 2023-06-03

## 2023-06-03 RX ORDER — BISACODYL 5 MG/1
5 TABLET, DELAYED RELEASE ORAL DAILY PRN
Status: DISCONTINUED | OUTPATIENT
Start: 2023-06-03 | End: 2023-06-06 | Stop reason: HOSPADM

## 2023-06-03 RX ORDER — BUDESONIDE 0.5 MG/2ML
0.5 INHALANT ORAL 2 TIMES DAILY
Status: DISCONTINUED | OUTPATIENT
Start: 2023-06-03 | End: 2023-06-03

## 2023-06-03 RX ORDER — LORAZEPAM 2 MG/ML
0.5 INJECTION INTRAMUSCULAR EVERY 4 HOURS PRN
Status: DISCONTINUED | OUTPATIENT
Start: 2023-06-03 | End: 2023-06-06 | Stop reason: HOSPADM

## 2023-06-03 RX ORDER — BISACODYL 10 MG
10 SUPPOSITORY, RECTAL RECTAL DAILY PRN
Status: DISCONTINUED | OUTPATIENT
Start: 2023-06-03 | End: 2023-06-06 | Stop reason: HOSPADM

## 2023-06-03 RX ORDER — BUDESONIDE AND FORMOTEROL FUMARATE DIHYDRATE 160; 4.5 UG/1; UG/1
2 AEROSOL RESPIRATORY (INHALATION) 2 TIMES DAILY
Status: DISCONTINUED | OUTPATIENT
Start: 2023-06-03 | End: 2023-06-03

## 2023-06-03 RX ORDER — SENNA PLUS 8.6 MG/1
2 TABLET ORAL 2 TIMES DAILY
Status: DISCONTINUED | OUTPATIENT
Start: 2023-06-03 | End: 2023-06-06 | Stop reason: HOSPADM

## 2023-06-03 RX ORDER — PRIMIDONE 50 MG/1
50 TABLET ORAL DAILY
Status: DISCONTINUED | OUTPATIENT
Start: 2023-06-03 | End: 2023-06-06 | Stop reason: HOSPADM

## 2023-06-03 RX ADMIN — PRIMIDONE 50 MG: 50 TABLET ORAL at 13:22

## 2023-06-03 RX ADMIN — ARFORMOTEROL TARTRATE 15 MCG: 15 SOLUTION RESPIRATORY (INHALATION) at 06:26

## 2023-06-03 RX ADMIN — LORAZEPAM 0.5 MG: 2 INJECTION INTRAMUSCULAR; INTRAVENOUS at 12:50

## 2023-06-03 RX ADMIN — BUDESONIDE INHALATION 500 MCG: 0.5 SUSPENSION RESPIRATORY (INHALATION) at 06:26

## 2023-06-03 RX ADMIN — ALBUTEROL SULFATE 2.5 MG: 2.5 SOLUTION RESPIRATORY (INHALATION) at 06:26

## 2023-06-03 RX ADMIN — PANTOPRAZOLE SODIUM 40 MG: 40 TABLET, DELAYED RELEASE ORAL at 13:22

## 2023-06-03 RX ADMIN — NITROGLYCERIN 0.5 INCH: 20 OINTMENT TOPICAL at 05:02

## 2023-06-03 RX ADMIN — ROPINIROLE HYDROCHLORIDE 1 MG: 1 TABLET, FILM COATED ORAL at 13:22

## 2023-06-03 RX ADMIN — HEPARIN SODIUM 4000 UNITS: 1000 INJECTION INTRAVENOUS; SUBCUTANEOUS at 19:51

## 2023-06-03 RX ADMIN — ALBUTEROL SULFATE 2.5 MG: 2.5 SOLUTION RESPIRATORY (INHALATION) at 18:16

## 2023-06-03 RX ADMIN — BUDESONIDE INHALATION 500 MCG: 0.5 SUSPENSION RESPIRATORY (INHALATION) at 18:16

## 2023-06-03 RX ADMIN — NITROGLYCERIN 0.5 INCH: 20 OINTMENT TOPICAL at 17:53

## 2023-06-03 RX ADMIN — ARFORMOTEROL TARTRATE 15 MCG: 15 SOLUTION RESPIRATORY (INHALATION) at 18:16

## 2023-06-03 RX ADMIN — METOPROLOL TARTRATE 25 MG: 25 TABLET, FILM COATED ORAL at 21:58

## 2023-06-03 RX ADMIN — TACROLIMUS 1 MG: 1 CAPSULE ORAL at 17:53

## 2023-06-03 RX ADMIN — NITROGLYCERIN 0.5 INCH: 20 OINTMENT TOPICAL at 13:25

## 2023-06-03 RX ADMIN — HEPARIN SODIUM 11 UNITS/KG/HR: 10000 INJECTION, SOLUTION INTRAVENOUS at 06:18

## 2023-06-03 RX ADMIN — HEPARIN SODIUM 15 UNITS/KG/HR: 10000 INJECTION, SOLUTION INTRAVENOUS at 19:50

## 2023-06-03 RX ADMIN — SENNOSIDES 17.2 MG: 8.6 TABLET, FILM COATED ORAL at 21:58

## 2023-06-03 RX ADMIN — HEPARIN SODIUM 4000 UNITS: 1000 INJECTION INTRAVENOUS; SUBCUTANEOUS at 06:13

## 2023-06-03 RX ADMIN — LORAZEPAM 0.5 MG: 2 INJECTION INTRAMUSCULAR; INTRAVENOUS at 19:43

## 2023-06-03 RX ADMIN — CITALOPRAM HYDROBROMIDE 40 MG: 20 TABLET ORAL at 13:22

## 2023-06-03 ASSESSMENT — PAIN DESCRIPTION - ORIENTATION: ORIENTATION: OTHER (COMMENT)

## 2023-06-03 ASSESSMENT — PAIN DESCRIPTION - DESCRIPTORS: DESCRIPTORS: SORE;DULL;ACHING

## 2023-06-03 ASSESSMENT — PAIN SCALES - GENERAL: PAINLEVEL_OUTOF10: 7

## 2023-06-03 ASSESSMENT — PAIN - FUNCTIONAL ASSESSMENT
PAIN_FUNCTIONAL_ASSESSMENT: NONE - DENIES PAIN
PAIN_FUNCTIONAL_ASSESSMENT: PREVENTS OR INTERFERES WITH MANY ACTIVE NOT PASSIVE ACTIVITIES

## 2023-06-03 ASSESSMENT — PAIN DESCRIPTION - FREQUENCY: FREQUENCY: CONTINUOUS

## 2023-06-03 ASSESSMENT — PAIN DESCRIPTION - PAIN TYPE: TYPE: ACUTE PAIN

## 2023-06-04 LAB
ALBUMIN SERPL-MCNC: 3.6 G/DL (ref 3.5–5.2)
ALP SERPL-CCNC: 49 U/L (ref 35–104)
ALT SERPL-CCNC: 20 U/L (ref 0–32)
ANION GAP SERPL CALCULATED.3IONS-SCNC: 15 MMOL/L (ref 7–16)
APTT BLD: 125.8 SEC (ref 24.5–35.1)
APTT BLD: 172.2 SEC (ref 24.5–35.1)
APTT BLD: 64.3 SEC (ref 24.5–35.1)
APTT BLD: 64.6 SEC (ref 24.5–35.1)
AST SERPL-CCNC: 23 U/L (ref 0–31)
BASOPHILS # BLD: 0 E9/L (ref 0–0.2)
BASOPHILS NFR BLD: 0.6 % (ref 0–2)
BILIRUB SERPL-MCNC: 0.7 MG/DL (ref 0–1.2)
BUN SERPL-MCNC: 22 MG/DL (ref 6–23)
CALCIUM SERPL-MCNC: 8.7 MG/DL (ref 8.6–10.2)
CHLORIDE SERPL-SCNC: 91 MMOL/L (ref 98–107)
CO2 SERPL-SCNC: 26 MMOL/L (ref 22–29)
CREAT SERPL-MCNC: 5.8 MG/DL (ref 0.5–1)
EKG ATRIAL RATE: 58 BPM
EKG ATRIAL RATE: 66 BPM
EKG P AXIS: 28 DEGREES
EKG P AXIS: 63 DEGREES
EKG P-R INTERVAL: 232 MS
EKG P-R INTERVAL: 232 MS
EKG Q-T INTERVAL: 438 MS
EKG Q-T INTERVAL: 476 MS
EKG QRS DURATION: 74 MS
EKG QRS DURATION: 84 MS
EKG QTC CALCULATION (BAZETT): 459 MS
EKG QTC CALCULATION (BAZETT): 467 MS
EKG R AXIS: 19 DEGREES
EKG R AXIS: 67 DEGREES
EKG T AXIS: -5 DEGREES
EKG T AXIS: -54 DEGREES
EKG VENTRICULAR RATE: 58 BPM
EKG VENTRICULAR RATE: 66 BPM
EOSINOPHIL # BLD: 0.03 E9/L (ref 0.05–0.5)
EOSINOPHIL NFR BLD: 1.7 % (ref 0–6)
ERYTHROCYTE [DISTWIDTH] IN BLOOD BY AUTOMATED COUNT: 14 FL (ref 11.5–15)
GLUCOSE SERPL-MCNC: 106 MG/DL (ref 74–99)
HCT VFR BLD AUTO: 30.1 % (ref 34–48)
HGB BLD-MCNC: 9.9 G/DL (ref 11.5–15.5)
LYMPHOCYTES # BLD: 0.34 E9/L (ref 1.5–4)
LYMPHOCYTES NFR BLD: 20 % (ref 20–42)
MAGNESIUM SERPL-MCNC: 2 MG/DL (ref 1.6–2.6)
MCH RBC QN AUTO: 33.3 PG (ref 26–35)
MCHC RBC AUTO-ENTMCNC: 32.9 % (ref 32–34.5)
MCV RBC AUTO: 101.3 FL (ref 80–99.9)
MONOCYTES # BLD: 0.1 E9/L (ref 0.1–0.95)
MONOCYTES NFR BLD: 6.1 % (ref 2–12)
NEUTROPHILS # BLD: 1.22 E9/L (ref 1.8–7.3)
NEUTS SEG NFR BLD: 72.2 % (ref 43–80)
OVALOCYTES: ABNORMAL
PHOSPHATE SERPL-MCNC: 5.9 MG/DL (ref 2.5–4.5)
PLATELET # BLD AUTO: 78 E9/L (ref 130–450)
PLATELET CONFIRMATION: NORMAL
PMV BLD AUTO: 11.3 FL (ref 7–12)
POIKILOCYTES: ABNORMAL
POTASSIUM SERPL-SCNC: 5 MMOL/L (ref 3.5–5)
PROT SERPL-MCNC: 6.4 G/DL (ref 6.4–8.3)
RBC # BLD AUTO: 2.97 E12/L (ref 3.5–5.5)
SODIUM SERPL-SCNC: 132 MMOL/L (ref 132–146)
TEAR DROP CELLS: ABNORMAL
TROPONIN, HIGH SENSITIVITY: 55 NG/L (ref 0–9)
WBC # BLD: 1.7 E9/L (ref 4.5–11.5)

## 2023-06-04 PROCEDURE — 99233 SBSQ HOSP IP/OBS HIGH 50: CPT | Performed by: INTERNAL MEDICINE

## 2023-06-04 PROCEDURE — 84100 ASSAY OF PHOSPHORUS: CPT

## 2023-06-04 PROCEDURE — 6370000000 HC RX 637 (ALT 250 FOR IP): Performed by: INTERNAL MEDICINE

## 2023-06-04 PROCEDURE — 6360000002 HC RX W HCPCS

## 2023-06-04 PROCEDURE — 84484 ASSAY OF TROPONIN QUANT: CPT

## 2023-06-04 PROCEDURE — 93010 ELECTROCARDIOGRAM REPORT: CPT | Performed by: INTERNAL MEDICINE

## 2023-06-04 PROCEDURE — 85025 COMPLETE CBC W/AUTO DIFF WBC: CPT

## 2023-06-04 PROCEDURE — 36415 COLL VENOUS BLD VENIPUNCTURE: CPT

## 2023-06-04 PROCEDURE — 2700000000 HC OXYGEN THERAPY PER DAY

## 2023-06-04 PROCEDURE — 6370000000 HC RX 637 (ALT 250 FOR IP)

## 2023-06-04 PROCEDURE — 6360000002 HC RX W HCPCS: Performed by: INTERNAL MEDICINE

## 2023-06-04 PROCEDURE — 80053 COMPREHEN METABOLIC PANEL: CPT

## 2023-06-04 PROCEDURE — 85730 THROMBOPLASTIN TIME PARTIAL: CPT

## 2023-06-04 PROCEDURE — 94640 AIRWAY INHALATION TREATMENT: CPT

## 2023-06-04 PROCEDURE — 1200000000 HC SEMI PRIVATE

## 2023-06-04 PROCEDURE — 83735 ASSAY OF MAGNESIUM: CPT

## 2023-06-04 RX ORDER — TACROLIMUS 1 MG/1
3 CAPSULE ORAL 2 TIMES DAILY
Status: DISCONTINUED | OUTPATIENT
Start: 2023-06-04 | End: 2023-06-06 | Stop reason: HOSPADM

## 2023-06-04 RX ORDER — IPRATROPIUM BROMIDE AND ALBUTEROL SULFATE 2.5; .5 MG/3ML; MG/3ML
1 SOLUTION RESPIRATORY (INHALATION) EVERY 6 HOURS
Status: DISCONTINUED | OUTPATIENT
Start: 2023-06-04 | End: 2023-06-06 | Stop reason: HOSPADM

## 2023-06-04 RX ORDER — ARFORMOTEROL TARTRATE 15 UG/2ML
15 SOLUTION RESPIRATORY (INHALATION) 2 TIMES DAILY
Status: DISCONTINUED | OUTPATIENT
Start: 2023-06-04 | End: 2023-06-06 | Stop reason: HOSPADM

## 2023-06-04 RX ORDER — BUDESONIDE 0.5 MG/2ML
0.5 INHALANT ORAL 2 TIMES DAILY
Status: DISCONTINUED | OUTPATIENT
Start: 2023-06-04 | End: 2023-06-06 | Stop reason: HOSPADM

## 2023-06-04 RX ORDER — BUDESONIDE AND FORMOTEROL FUMARATE DIHYDRATE 160; 4.5 UG/1; UG/1
2 AEROSOL RESPIRATORY (INHALATION) 2 TIMES DAILY
Status: DISCONTINUED | OUTPATIENT
Start: 2023-06-04 | End: 2023-06-04 | Stop reason: CLARIF

## 2023-06-04 RX ADMIN — CITALOPRAM HYDROBROMIDE 40 MG: 20 TABLET ORAL at 08:39

## 2023-06-04 RX ADMIN — NITROGLYCERIN 0.5 INCH: 20 OINTMENT TOPICAL at 20:16

## 2023-06-04 RX ADMIN — HEPARIN SODIUM 2000 UNITS: 1000 INJECTION INTRAVENOUS; SUBCUTANEOUS at 18:09

## 2023-06-04 RX ADMIN — PANTOPRAZOLE SODIUM 40 MG: 40 TABLET, DELAYED RELEASE ORAL at 08:39

## 2023-06-04 RX ADMIN — BISACODYL 5 MG: 5 TABLET, COATED ORAL at 06:49

## 2023-06-04 RX ADMIN — BUDESONIDE INHALATION 500 MCG: 0.5 SUSPENSION RESPIRATORY (INHALATION) at 04:18

## 2023-06-04 RX ADMIN — ALBUTEROL SULFATE 2.5 MG: 2.5 SOLUTION RESPIRATORY (INHALATION) at 04:18

## 2023-06-04 RX ADMIN — SENNOSIDES 17.2 MG: 8.6 TABLET, FILM COATED ORAL at 08:39

## 2023-06-04 RX ADMIN — TACROLIMUS 3 MG: 1 CAPSULE ORAL at 20:17

## 2023-06-04 RX ADMIN — ROPINIROLE HYDROCHLORIDE 1 MG: 1 TABLET, FILM COATED ORAL at 08:39

## 2023-06-04 RX ADMIN — ARFORMOTEROL TARTRATE 15 MCG: 15 SOLUTION RESPIRATORY (INHALATION) at 04:18

## 2023-06-04 RX ADMIN — LORAZEPAM 0.5 MG: 2 INJECTION INTRAMUSCULAR; INTRAVENOUS at 20:12

## 2023-06-04 RX ADMIN — NITROGLYCERIN 0.5 INCH: 20 OINTMENT TOPICAL at 13:59

## 2023-06-04 RX ADMIN — ROPINIROLE HYDROCHLORIDE 1 MG: 1 TABLET, FILM COATED ORAL at 20:19

## 2023-06-04 RX ADMIN — HEPARIN SODIUM 14 UNITS/KG/HR: 10000 INJECTION, SOLUTION INTRAVENOUS at 09:27

## 2023-06-04 RX ADMIN — TACROLIMUS 3 MG: 1 CAPSULE ORAL at 09:34

## 2023-06-04 RX ADMIN — IPRATROPIUM BROMIDE AND ALBUTEROL SULFATE 1 DOSE: .5; 2.5 SOLUTION RESPIRATORY (INHALATION) at 23:03

## 2023-06-04 RX ADMIN — HEPARIN SODIUM 2000 UNITS: 1000 INJECTION INTRAVENOUS; SUBCUTANEOUS at 09:22

## 2023-06-04 RX ADMIN — NITROGLYCERIN 0.5 INCH: 20 OINTMENT TOPICAL at 00:02

## 2023-06-04 RX ADMIN — PRIMIDONE 50 MG: 50 TABLET ORAL at 08:39

## 2023-06-04 RX ADMIN — BUDESONIDE INHALATION 500 MCG: 0.5 SUSPENSION RESPIRATORY (INHALATION) at 17:29

## 2023-06-04 RX ADMIN — IPRATROPIUM BROMIDE AND ALBUTEROL SULFATE 1 DOSE: .5; 2.5 SOLUTION RESPIRATORY (INHALATION) at 17:29

## 2023-06-04 RX ADMIN — METOPROLOL TARTRATE 25 MG: 25 TABLET, FILM COATED ORAL at 08:54

## 2023-06-04 RX ADMIN — SENNOSIDES 17.2 MG: 8.6 TABLET, FILM COATED ORAL at 20:22

## 2023-06-04 RX ADMIN — ARFORMOTEROL TARTRATE 15 MCG: 15 SOLUTION RESPIRATORY (INHALATION) at 17:29

## 2023-06-04 ASSESSMENT — PAIN DESCRIPTION - FREQUENCY
FREQUENCY: CONTINUOUS
FREQUENCY: CONTINUOUS

## 2023-06-04 ASSESSMENT — PAIN DESCRIPTION - LOCATION
LOCATION: CHEST;BACK
LOCATION: CHEST;NECK;ABDOMEN

## 2023-06-04 ASSESSMENT — PAIN DESCRIPTION - PAIN TYPE
TYPE: ACUTE PAIN
TYPE: ACUTE PAIN

## 2023-06-04 ASSESSMENT — PAIN SCALES - GENERAL
PAINLEVEL_OUTOF10: 7
PAINLEVEL_OUTOF10: 0
PAINLEVEL_OUTOF10: 7

## 2023-06-04 ASSESSMENT — PAIN - FUNCTIONAL ASSESSMENT
PAIN_FUNCTIONAL_ASSESSMENT: PREVENTS OR INTERFERES SOME ACTIVE ACTIVITIES AND ADLS
PAIN_FUNCTIONAL_ASSESSMENT: PREVENTS OR INTERFERES SOME ACTIVE ACTIVITIES AND ADLS

## 2023-06-04 ASSESSMENT — PAIN DESCRIPTION - DESCRIPTORS
DESCRIPTORS: ACHING;DULL;SORE
DESCRIPTORS: ACHING;DISCOMFORT;SORE

## 2023-06-04 ASSESSMENT — PAIN DESCRIPTION - ORIENTATION
ORIENTATION: OTHER (COMMENT)
ORIENTATION: OTHER (COMMENT)

## 2023-06-05 ENCOUNTER — HOSPITAL ENCOUNTER (OUTPATIENT)
Age: 63
Discharge: HOME | End: 2023-06-05

## 2023-06-05 LAB
ALBUMIN SERPL-MCNC: 3.9 G/DL (ref 3.5–5.2)
ALP SERPL-CCNC: 50 U/L (ref 35–104)
ALT SERPL-CCNC: 18 U/L (ref 0–32)
ANION GAP SERPL CALCULATED.3IONS-SCNC: 15 MMOL/L (ref 7–16)
ANISOCYTOSIS: ABNORMAL
APTT BLD: 125.3 SEC (ref 24.5–35.1)
APTT BLD: 65.6 SEC (ref 24.5–35.1)
APTT BLD: 70.5 SEC (ref 24.5–35.1)
AST SERPL-CCNC: 20 U/L (ref 0–31)
BASOPHILS # BLD: 0.01 E9/L (ref 0–0.2)
BASOPHILS NFR BLD: 0.3 % (ref 0–2)
BILIRUB SERPL-MCNC: 0.7 MG/DL (ref 0–1.2)
BUN SERPL-MCNC: 28 MG/DL (ref 6–23)
CALCIUM SERPL-MCNC: 9.1 MG/DL (ref 8.6–10.2)
CHLORIDE SERPL-SCNC: 89 MMOL/L (ref 98–107)
CO2 SERPL-SCNC: 26 MMOL/L (ref 22–29)
CREAT SERPL-MCNC: 7.2 MG/DL (ref 0.5–1)
EOSINOPHIL # BLD: 0.05 E9/L (ref 0.05–0.5)
EOSINOPHIL NFR BLD: 1.7 % (ref 0–6)
ERYTHROCYTE [DISTWIDTH] IN BLOOD BY AUTOMATED COUNT: 13.9 FL (ref 11.5–15)
GLUCOSE SERPL-MCNC: 97 MG/DL (ref 74–99)
HCT VFR BLD AUTO: 33.3 % (ref 34–48)
HGB BLD-MCNC: 10.7 G/DL (ref 11.5–15.5)
IMM GRANULOCYTES # BLD: 0.01 E9/L
IMM GRANULOCYTES NFR BLD: 0.3 % (ref 0–5)
LV EF: 50 %
LVEF MODALITY: NORMAL
LYMPHOCYTES # BLD: 1.13 E9/L (ref 1.5–4)
LYMPHOCYTES NFR BLD: 38 % (ref 20–42)
MAGNESIUM SERPL-MCNC: 2 MG/DL (ref 1.6–2.6)
MCH RBC QN AUTO: 33.4 PG (ref 26–35)
MCHC RBC AUTO-ENTMCNC: 32.1 % (ref 32–34.5)
MCV RBC AUTO: 104.1 FL (ref 80–99.9)
MONOCYTES # BLD: 0.18 E9/L (ref 0.1–0.95)
MONOCYTES NFR BLD: 6.1 % (ref 2–12)
NEUTROPHILS # BLD: 1.59 E9/L (ref 1.8–7.3)
NEUTS SEG NFR BLD: 53.6 % (ref 43–80)
OVALOCYTES: ABNORMAL
PHOSPHATE SERPL-MCNC: 6.2 MG/DL (ref 2.5–4.5)
PLATELET # BLD AUTO: 89 E9/L (ref 130–450)
PLATELET CONFIRMATION: NORMAL
PMV BLD AUTO: 10.4 FL (ref 7–12)
POIKILOCYTES: ABNORMAL
POTASSIUM SERPL-SCNC: 5.9 MMOL/L (ref 3.5–5)
PROT SERPL-MCNC: 7.1 G/DL (ref 6.4–8.3)
RBC # BLD AUTO: 3.2 E12/L (ref 3.5–5.5)
SODIUM SERPL-SCNC: 130 MMOL/L (ref 132–146)
TEAR DROP CELLS: ABNORMAL
WBC # BLD: 3 E9/L (ref 4.5–11.5)

## 2023-06-05 PROCEDURE — 6360000004 HC RX CONTRAST MEDICATION: Performed by: INTERNAL MEDICINE

## 2023-06-05 PROCEDURE — 85730 THROMBOPLASTIN TIME PARTIAL: CPT

## 2023-06-05 PROCEDURE — 6360000002 HC RX W HCPCS: Performed by: INTERNAL MEDICINE

## 2023-06-05 PROCEDURE — 6370000000 HC RX 637 (ALT 250 FOR IP): Performed by: INTERNAL MEDICINE

## 2023-06-05 PROCEDURE — 84100 ASSAY OF PHOSPHORUS: CPT

## 2023-06-05 PROCEDURE — 6370000000 HC RX 637 (ALT 250 FOR IP)

## 2023-06-05 PROCEDURE — 90935 HEMODIALYSIS ONE EVALUATION: CPT

## 2023-06-05 PROCEDURE — 6360000002 HC RX W HCPCS

## 2023-06-05 PROCEDURE — 85025 COMPLETE CBC W/AUTO DIFF WBC: CPT

## 2023-06-05 PROCEDURE — 80053 COMPREHEN METABOLIC PANEL: CPT

## 2023-06-05 PROCEDURE — 36415 COLL VENOUS BLD VENIPUNCTURE: CPT

## 2023-06-05 PROCEDURE — 2700000000 HC OXYGEN THERAPY PER DAY

## 2023-06-05 PROCEDURE — 94640 AIRWAY INHALATION TREATMENT: CPT

## 2023-06-05 PROCEDURE — C8929 TTE W OR WO FOL WCON,DOPPLER: HCPCS

## 2023-06-05 PROCEDURE — 1200000000 HC SEMI PRIVATE

## 2023-06-05 PROCEDURE — 99233 SBSQ HOSP IP/OBS HIGH 50: CPT | Performed by: INTERNAL MEDICINE

## 2023-06-05 PROCEDURE — 83735 ASSAY OF MAGNESIUM: CPT

## 2023-06-05 RX ORDER — ASPIRIN 81 MG/1
81 TABLET, CHEWABLE ORAL DAILY
Status: DISCONTINUED | OUTPATIENT
Start: 2023-06-05 | End: 2023-06-06 | Stop reason: HOSPADM

## 2023-06-05 RX ORDER — BISACODYL 10 MG
10 SUPPOSITORY, RECTAL RECTAL DAILY PRN
COMMUNITY
Start: 2023-06-05

## 2023-06-05 RX ORDER — POLYETHYLENE GLYCOL 3350 17 G/17G
17 POWDER, FOR SOLUTION ORAL DAILY PRN
Qty: 527 G | Refills: 1 | COMMUNITY
Start: 2023-06-05

## 2023-06-05 RX ADMIN — ROPINIROLE HYDROCHLORIDE 1 MG: 1 TABLET, FILM COATED ORAL at 22:06

## 2023-06-05 RX ADMIN — ARFORMOTEROL TARTRATE 15 MCG: 15 SOLUTION RESPIRATORY (INHALATION) at 20:23

## 2023-06-05 RX ADMIN — ASPIRIN 81 MG: 81 TABLET, CHEWABLE ORAL at 20:00

## 2023-06-05 RX ADMIN — LORAZEPAM 0.5 MG: 2 INJECTION INTRAMUSCULAR; INTRAVENOUS at 05:28

## 2023-06-05 RX ADMIN — HEPARIN SODIUM 13 UNITS/KG/HR: 10000 INJECTION, SOLUTION INTRAVENOUS at 00:51

## 2023-06-05 RX ADMIN — PANTOPRAZOLE SODIUM 40 MG: 40 TABLET, DELAYED RELEASE ORAL at 14:19

## 2023-06-05 RX ADMIN — TACROLIMUS 3 MG: 1 CAPSULE ORAL at 22:07

## 2023-06-05 RX ADMIN — PRIMIDONE 50 MG: 50 TABLET ORAL at 14:19

## 2023-06-05 RX ADMIN — BUDESONIDE INHALATION 500 MCG: 0.5 SUSPENSION RESPIRATORY (INHALATION) at 20:23

## 2023-06-05 RX ADMIN — NITROGLYCERIN 0.5 INCH: 20 OINTMENT TOPICAL at 20:01

## 2023-06-05 RX ADMIN — BUDESONIDE INHALATION 500 MCG: 0.5 SUSPENSION RESPIRATORY (INHALATION) at 06:31

## 2023-06-05 RX ADMIN — PERFLUTREN 1.5 ML: 6.52 INJECTION, SUSPENSION INTRAVENOUS at 11:08

## 2023-06-05 RX ADMIN — CITALOPRAM HYDROBROMIDE 40 MG: 20 TABLET ORAL at 14:19

## 2023-06-05 RX ADMIN — IPRATROPIUM BROMIDE AND ALBUTEROL SULFATE 1 DOSE: .5; 2.5 SOLUTION RESPIRATORY (INHALATION) at 06:31

## 2023-06-05 RX ADMIN — ARFORMOTEROL TARTRATE 15 MCG: 15 SOLUTION RESPIRATORY (INHALATION) at 06:30

## 2023-06-05 RX ADMIN — NITROGLYCERIN 0.5 INCH: 20 OINTMENT TOPICAL at 06:33

## 2023-06-05 RX ADMIN — ROPINIROLE HYDROCHLORIDE 1 MG: 1 TABLET, FILM COATED ORAL at 14:19

## 2023-06-05 RX ADMIN — NITROGLYCERIN 0.5 INCH: 20 OINTMENT TOPICAL at 01:36

## 2023-06-05 RX ADMIN — TACROLIMUS 3 MG: 1 CAPSULE ORAL at 14:19

## 2023-06-05 RX ADMIN — ACETAMINOPHEN 500 MG: 500 TABLET ORAL at 22:12

## 2023-06-05 RX ADMIN — HEPARIN SODIUM 13 UNITS/KG/HR: 10000 INJECTION, SOLUTION INTRAVENOUS at 07:28

## 2023-06-05 RX ADMIN — SENNOSIDES 17.2 MG: 8.6 TABLET, FILM COATED ORAL at 14:19

## 2023-06-05 RX ADMIN — SENNOSIDES 17.2 MG: 8.6 TABLET, FILM COATED ORAL at 22:07

## 2023-06-05 ASSESSMENT — PAIN SCALES - GENERAL
PAINLEVEL_OUTOF10: 8
PAINLEVEL_OUTOF10: 10

## 2023-06-05 ASSESSMENT — PAIN DESCRIPTION - LOCATION
LOCATION: HEAD
LOCATION: CHEST;ABDOMEN

## 2023-06-05 ASSESSMENT — PAIN DESCRIPTION - DESCRIPTORS
DESCRIPTORS: PRESSURE
DESCRIPTORS: ACHING

## 2023-06-05 ASSESSMENT — PAIN DESCRIPTION - ORIENTATION
ORIENTATION: MID
ORIENTATION: ANTERIOR

## 2023-06-05 ASSESSMENT — PAIN DESCRIPTION - PAIN TYPE: TYPE: ACUTE PAIN

## 2023-06-05 NOTE — FLOWSHEET NOTE
06/05/23 1822   Vital Signs   BP (!) 125/49   Temp 98.2 °F (36.8 °C)   Pulse 60   Respirations 18   Weight - Scale 197 lb 1.5 oz (89.4 kg)   Percent Weight Change -2.19   Post-Hemodialysis Assessment   Machine Disinfection Process Acid/Vinegar Clean;Heat Disinfect   Rinseback Volume (ml) 300 ml   Blood Volume Processed (Liters) 80.9 l/min   Dialyzer Clearance Lightly streaked   Duration of Treatment (minutes) 210 minutes   Heparin Amount Administered During Treatment (mL) 0 mL   Hemodialysis Intake (ml) 300 ml   Hemodialysis Output (ml) 2300 ml   NET Removed (ml) 2000   Patient Response to Treatment Tolerated tx well   Bilateral Breath Sounds Diminished   Patient Disposition Return to room

## 2023-06-05 NOTE — CARE COORDINATION
Ss note: 6/5/20233:01 PM Pt currently oor for dialysis. Per IDR pt is on 3 liters of oxygen at home. SW to follow for assessment.  TYRESE Parrish

## 2023-06-06 ENCOUNTER — HOSPITAL ENCOUNTER (OUTPATIENT)
Dept: CARDIAC CATH/INVASIVE PROCEDURES | Age: 63
Discharge: HOME | End: 2023-06-06
Payer: MEDICARE

## 2023-06-06 VITALS — DIASTOLIC BLOOD PRESSURE: 81 MMHG | HEART RATE: 63 BPM | SYSTOLIC BLOOD PRESSURE: 154 MMHG | TEMPERATURE: 97.5 F

## 2023-06-06 VITALS
BODY MASS INDEX: 34.92 KG/M2 | WEIGHT: 197.09 LBS | SYSTOLIC BLOOD PRESSURE: 114 MMHG | HEIGHT: 63 IN | HEART RATE: 64 BPM | RESPIRATION RATE: 18 BRPM | TEMPERATURE: 98.8 F | OXYGEN SATURATION: 95 % | DIASTOLIC BLOOD PRESSURE: 56 MMHG

## 2023-06-06 LAB
ABO + RH BLD: NORMAL
ALBUMIN SERPL-MCNC: 3.5 G/DL (ref 3.5–5.2)
ALP SERPL-CCNC: 46 U/L (ref 35–104)
ALT SERPL-CCNC: 17 U/L (ref 0–32)
ANION GAP SERPL CALCULATED.3IONS-SCNC: 11 MMOL/L (ref 7–16)
APTT BLD: 64.2 SEC (ref 24.5–35.1)
AST SERPL-CCNC: 21 U/L (ref 0–31)
BASOPHILS # BLD: 0 E9/L (ref 0–0.2)
BASOPHILS NFR BLD: 0.5 % (ref 0–2)
BILIRUB SERPL-MCNC: 0.7 MG/DL (ref 0–1.2)
BLD GP AB SCN SERPL QL: NORMAL
BUN SERPL-MCNC: 12 MG/DL (ref 6–23)
BURR CELLS: ABNORMAL
CALCIUM SERPL-MCNC: 8.7 MG/DL (ref 8.6–10.2)
CHLORIDE SERPL-SCNC: 97 MMOL/L (ref 98–107)
CO2 SERPL-SCNC: 25 MMOL/L (ref 22–29)
CREAT SERPL-MCNC: 4.2 MG/DL (ref 0.5–1)
EOSINOPHIL # BLD: 0 E9/L (ref 0.05–0.5)
EOSINOPHIL NFR BLD: 1.5 % (ref 0–6)
ERYTHROCYTE [DISTWIDTH] IN BLOOD BY AUTOMATED COUNT: 13.8 FL (ref 11.5–15)
GLUCOSE SERPL-MCNC: 77 MG/DL (ref 74–99)
HCT VFR BLD AUTO: 30.7 % (ref 34–48)
HGB BLD-MCNC: 9.9 G/DL (ref 11.5–15.5)
LYMPHOCYTES # BLD: 0.4 E9/L (ref 1.5–4)
LYMPHOCYTES NFR BLD: 20 % (ref 20–42)
MAGNESIUM SERPL-MCNC: 2 MG/DL (ref 1.6–2.6)
MCH RBC QN AUTO: 33.1 PG (ref 26–35)
MCHC RBC AUTO-ENTMCNC: 32.2 % (ref 32–34.5)
MCV RBC AUTO: 102.7 FL (ref 80–99.9)
MONOCYTES # BLD: 0.1 E9/L (ref 0.1–0.95)
MONOCYTES NFR BLD: 5.2 % (ref 2–12)
NEUTROPHILS # BLD: 1.5 E9/L (ref 1.8–7.3)
NEUTS SEG NFR BLD: 74.8 % (ref 43–80)
OVALOCYTES: ABNORMAL
PHOSPHATE SERPL-MCNC: 4.4 MG/DL (ref 2.5–4.5)
PLATELET # BLD AUTO: 76 E9/L (ref 130–450)
PLATELET CONFIRMATION: NORMAL
PMV BLD AUTO: 10.6 FL (ref 7–12)
POIKILOCYTES: ABNORMAL
POTASSIUM SERPL-SCNC: 5.1 MMOL/L (ref 3.5–5)
PROT SERPL-MCNC: 6.5 G/DL (ref 6.4–8.3)
RBC # BLD AUTO: 2.99 E12/L (ref 3.5–5.5)
SODIUM SERPL-SCNC: 133 MMOL/L (ref 132–146)
TEAR DROP CELLS: ABNORMAL
WBC # BLD: 2 E9/L (ref 4.5–11.5)

## 2023-06-06 PROCEDURE — 6360000002 HC RX W HCPCS

## 2023-06-06 PROCEDURE — 85730 THROMBOPLASTIN TIME PARTIAL: CPT

## 2023-06-06 PROCEDURE — 36415 COLL VENOUS BLD VENIPUNCTURE: CPT

## 2023-06-06 PROCEDURE — 84100 ASSAY OF PHOSPHORUS: CPT

## 2023-06-06 PROCEDURE — C1769 GUIDE WIRE: HCPCS

## 2023-06-06 PROCEDURE — 6360000002 HC RX W HCPCS: Performed by: INTERNAL MEDICINE

## 2023-06-06 PROCEDURE — 2500000003 HC RX 250 WO HCPCS

## 2023-06-06 PROCEDURE — 94640 AIRWAY INHALATION TREATMENT: CPT

## 2023-06-06 PROCEDURE — 85025 COMPLETE CBC W/AUTO DIFF WBC: CPT

## 2023-06-06 PROCEDURE — 80053 COMPREHEN METABOLIC PANEL: CPT

## 2023-06-06 PROCEDURE — 83735 ASSAY OF MAGNESIUM: CPT

## 2023-06-06 PROCEDURE — 6370000000 HC RX 637 (ALT 250 FOR IP): Performed by: INTERNAL MEDICINE

## 2023-06-06 PROCEDURE — 93454 CORONARY ARTERY ANGIO S&I: CPT

## 2023-06-06 PROCEDURE — 86850 RBC ANTIBODY SCREEN: CPT

## 2023-06-06 PROCEDURE — C1894 INTRO/SHEATH, NON-LASER: HCPCS

## 2023-06-06 PROCEDURE — 2709999900 HC NON-CHARGEABLE SUPPLY

## 2023-06-06 PROCEDURE — 86900 BLOOD TYPING SEROLOGIC ABO: CPT

## 2023-06-06 PROCEDURE — 2700000000 HC OXYGEN THERAPY PER DAY

## 2023-06-06 PROCEDURE — 86901 BLOOD TYPING SEROLOGIC RH(D): CPT

## 2023-06-06 PROCEDURE — C1760 CLOSURE DEV, VASC: HCPCS

## 2023-06-06 RX ORDER — SODIUM CHLORIDE 0.9 % (FLUSH) 0.9 %
5-40 SYRINGE (ML) INJECTION PRN
Status: DISCONTINUED | OUTPATIENT
Start: 2023-06-06 | End: 2023-06-07 | Stop reason: HOSPADM

## 2023-06-06 RX ORDER — SODIUM CHLORIDE 9 MG/ML
INJECTION, SOLUTION INTRAVENOUS PRN
Status: DISCONTINUED | OUTPATIENT
Start: 2023-06-06 | End: 2023-06-07 | Stop reason: HOSPADM

## 2023-06-06 RX ORDER — ASPIRIN 81 MG/1
81 TABLET, CHEWABLE ORAL DAILY
Qty: 30 TABLET | Refills: 3 | COMMUNITY
Start: 2023-06-06

## 2023-06-06 RX ORDER — ACETAMINOPHEN 325 MG/1
650 TABLET ORAL EVERY 4 HOURS PRN
Status: DISCONTINUED | OUTPATIENT
Start: 2023-06-06 | End: 2023-06-07 | Stop reason: HOSPADM

## 2023-06-06 RX ORDER — SODIUM CHLORIDE 0.9 % (FLUSH) 0.9 %
5-40 SYRINGE (ML) INJECTION EVERY 12 HOURS SCHEDULED
Status: DISCONTINUED | OUTPATIENT
Start: 2023-06-06 | End: 2023-06-07 | Stop reason: HOSPADM

## 2023-06-06 RX ADMIN — IPRATROPIUM BROMIDE AND ALBUTEROL SULFATE 1 DOSE: .5; 2.5 SOLUTION RESPIRATORY (INHALATION) at 06:51

## 2023-06-06 RX ADMIN — HEPARIN SODIUM 2000 UNITS: 1000 INJECTION INTRAVENOUS; SUBCUTANEOUS at 07:58

## 2023-06-06 RX ADMIN — ASPIRIN 81 MG: 81 TABLET, CHEWABLE ORAL at 08:01

## 2023-06-06 RX ADMIN — ARFORMOTEROL TARTRATE 15 MCG: 15 SOLUTION RESPIRATORY (INHALATION) at 06:51

## 2023-06-06 RX ADMIN — IPRATROPIUM BROMIDE AND ALBUTEROL SULFATE 1 DOSE: .5; 2.5 SOLUTION RESPIRATORY (INHALATION) at 00:31

## 2023-06-06 RX ADMIN — NITROGLYCERIN 0.5 INCH: 20 OINTMENT TOPICAL at 02:13

## 2023-06-06 RX ADMIN — NITROGLYCERIN 0.5 INCH: 20 OINTMENT TOPICAL at 08:30

## 2023-06-06 RX ADMIN — METOPROLOL TARTRATE 25 MG: 25 TABLET, FILM COATED ORAL at 08:01

## 2023-06-06 RX ADMIN — BUDESONIDE INHALATION 500 MCG: 0.5 SUSPENSION RESPIRATORY (INHALATION) at 06:51

## 2023-06-06 RX ADMIN — HEPARIN SODIUM 12 UNITS/KG/HR: 10000 INJECTION, SOLUTION INTRAVENOUS at 08:00

## 2023-06-06 ASSESSMENT — PAIN DESCRIPTION - DESCRIPTORS: DESCRIPTORS: PRESSURE

## 2023-06-06 ASSESSMENT — PAIN DESCRIPTION - PAIN TYPE: TYPE: ACUTE PAIN

## 2023-06-06 ASSESSMENT — PAIN SCALES - GENERAL
PAINLEVEL_OUTOF10: 4
PAINLEVEL_OUTOF10: 7
PAINLEVEL_OUTOF10: 0

## 2023-06-06 ASSESSMENT — PAIN DESCRIPTION - LOCATION: LOCATION: CHEST

## 2023-06-06 ASSESSMENT — PAIN DESCRIPTION - ORIENTATION: ORIENTATION: MID

## 2023-06-06 NOTE — DISCHARGE SUMMARY
Department of Internal Medicine        CHIEF COMPLAINT: Left-sided chest pain,     Reason for Admission: Chest pain, hypocalcemia    HISTORY OF PRESENT ILLNESS:      The patient is a 63 y.o. female who presents with left chest pain that radiates to the left neck and shoulder. It was a pressure sensation. Patient also has epigastric discomfort off and on. Patient denies any dizziness, palpitation or syncope. Initial troponin was 35 with repeat of 47. Patient also received 2015-3 0.3. Calcium was 5.3 with albumin 2.3. WBC 18.9 with a hemoglobin 10 and platelet count 71. Vital signs are stable except patient's on 4 L nasal cannula satting 96%. EKG showed mild nonspecific diffuse ST-T changes. 6/3/2023  Patient seen and examined on the telemetry floor/dialysis. Neurology note reviewed. Patient complaining of intermittent significant generalized tremor. Patient denies having this at home. She denies any chest or abdominal pain. There is no new onset paresthesia. WBC is 2.9 with a hemoglobin 10.0. Serum potassium 6.0 today with the patient needing dialysis with history of chronic renal failure. Heart rate is 60 with a blood pressure 120/51. O2 sat 96% on 5 L nasal cannula. 6/4/2023  Patient seen examined on telemetry floor. Patient still complaining of mid left-sided chest heaviness with radiation to left neck. Patient states the left back pain is not worse with movement of her head. Patient also points increased dizziness/lightheadedness with standing. Patient also states she has chronic dizziness for some time and has followed up with ENT. Electrolytes liver enzymes centrally normal with WBC 1.7 with hemoglobin 9.9 and platelet count 78. Patient still on heparin drip. Temperature is 98.7 with heart rate 65 and blood pressure 137/80. O2 sat 92% on 5 L nasal cannula. 6/5/2023  Patient seen and examined telemetry floor. Patient was having abd pain but had a good BM today and feels better.

## 2023-06-06 NOTE — DISCHARGE INSTRUCTIONS
FEMORAL DISCHARGE INSTRUCTIONS      Discharge Instructions:    Please follow instructions closely for prevention of complications. Special Instructions:  Splint catheterization site with activity today. Splint catheterization site with:             ~Changing Positions             ~Coughing             ~Sneezing             ~Laughing    Call your physician if you notice:  ~increased pain at the catheterization site.  ~increase swelling at the catheterization site. ~redness or drainage at the catheterization site. ~numbness, tingling or cramping in the catheterization leg at rest or with activity. *Remove Dressing  tomorrow [id number]  *Drink 3-4 extra glasses of water today. *No tub bathing or swimming for 3 days. *No driving for 24 hrs. *Do not lift anything heavier than 10 pounds for 3 days. *Continue low fat diet. **If you have any questions or problems after discharge, please notify your doctor. Call 9-1-1 if you have active bleeding from your catheterization site. ***DO  [address] Lay down and apply pressure to site until help arrives.

## 2023-06-06 NOTE — DISCHARGE INSTRUCTIONS
Your information:  Name: Mai Eason  : 1960    Your instructions:    YOU ARE BEING DISCHARGED HOME. PLEASE MAKE AND KEEP YOUR FOLLOW UP APPOINTMENTS. IF YOU EXPERIENCE ANY OF THE FOLLOWING SYMPTOMS, CHEST PAIN, SHORTNESS OF BREATH, COUGHING UP BLOOD OR BLOODY SPUTUM, STOMACH PAIN OR CRAMPING, DARK, TARRY STOOLS, LOSS OF APPETITE, GENERAL NOT FEELING WELL, SIGNS AND SYMPTOMS OF INFECTION LIKE FEVER AND OR CHILLS, PLEASE CALL PCP OR RETURN TO THE EMERGENCY ROOM. What to do after you leave the hospital:    Recommended diet: cardiac diet    Recommended activity: activity as tolerated        The following personal items were collected during your admission and were returned to you:    Belongings  Dental Appliances: None  Vision - Corrective Lenses: None  Hearing Aid: None  Clothing: Shirt, Pants, Footwear  Jewelry: Ring  Body Piercings Removed: No  Electronic Devices: Cell Phone,   Weapons (Notify Protective Services/Security): None  Home Medications: None  Valuables Given To: Patient  Provide Name(s) of Who Valuable(s) Were Given To: patient    Information obtained by:  By signing below, I understand that if any problems occur once I leave the hospital I am to contact PCP. I understand and acknowledge receipt of the instructions indicated above.

## 2023-06-06 NOTE — PROCEDURES
[address]                  [address] [facility]                            CARDIAC CATHETERIZATION    PATIENT NAME: Kay Bowers                      :        1960  MED REC NO:   [id number]                            ROOM:  ACCOUNT NO:   [de-identified]                           ADMIT DATE: 2023  PROVIDER:     Adelia Lara MD    DATE OF PROCEDURE:  2023    PROCEDURES PERFORMED:  1. Coronary angiography. 2.  Deployment of Perclose ProStyle suture closure device in the right  common femoral artery. 3.  Conscious sedation using Versed and fentanyl. Indication #4, score of 7. INDICATION FOR PROCEDURE:  The patient is a 62-year-old female with  history of end-stage renal disease, liver transplant, family history of  CAD, who has been having chest pain. The patient was referred to  cardiac catheterization for further evaluation. DESCRIPTION OF PROCEDURE:  After the appropriate informed consent, the  right wrist area was prepped and draped in the usual sterile fashion. A  timeout was called. The right wrist area was locally anesthetized with  a 2 mL of 2% lidocaine. Attempts to find the right radial artery using  real time ultrasound guidance was unsuccessful. It was a really small  vessel. At that time, the right radial approach was running, attention  was then directed to the right groin area, which was locally  anesthetized with 10 mL of 2% lidocaine. A Cook needle was used to  access the right femoral artery using real-time ultrasound guidance. A  6-Gabonese introducer sheath was used to cannulate the right femoral  artery. A 6-Gabonese JL-4 and 6-Gabonese JR-4 catheters were used for  coronary angiography in multiple projections. ANGIOGRAPHIC FINDINGS:  1. The left main coronary artery arises normally from the left sinus of  Valsalva. It is a large vessel without any disease.   It bifurcates into  left anterior

## 2023-06-21 NOTE — PROGRESS NOTES
[facility] MD Cedric Frost MD Domnick Porch, MD Alta Churches, CNP   Katie Oden, ANP  Valentin Malone, NORY  Progress Note    6/5/2023    SUBJECTIVE:   6/5: Ongoing anorexia, early satiety, sense of feeling bloated. She has been having multiple bowel movements throughout the day, all small volume, all \"dry and hard as rocks. \"  At times has had a considerable straining in order to move her bowel. Ongoing chest and \"lung\" pressure. Just with even minimal exertion. Intermittent headache. Occasional nausea no emesis. No peripheral swelling. Dialysis today without complication. Treatment well-tolerated. BP stable throughout treatment and has remained stable since then. Otherwise ROS unremarkable    PROBLEM LIST:    Principal Problem:    Drug-induced tremor  Active Problems:    Chest pain  Resolved Problems:    * No resolved hospital problems. *         DIET:    ADULT DIET; Regular  Diet NPO Exceptions are: Sips of Water with Meds  ADULT ORAL NUTRITION SUPPLEMENT; Breakfast, Dinner;  Other Oral Supplement; Gelatein 20  ADULT ORAL NUTRITION SUPPLEMENT; Lunch, Dinner; Frozen Oral Supplement     MEDS (scheduled):    aspirin  81 mg Oral Daily    tacrolimus  3 mg Oral BID    arformoterol tartrate  15 mcg Nebulization BID    And    budesonide  0.5 mg Nebulization BID    ipratropium 0.5 mg-albuterol 2.5 mg  1 Dose Inhalation Q6H    primidone  50 mg Oral Daily    metoprolol tartrate  25 mg Oral BID    senna  2 tablet Oral BID    citalopram  40 mg Oral Daily    pantoprazole  40 mg Oral Daily    entecavir  0.5 mg Oral Every Other Day    nitroglycerin  0.5 inch Topical 4 times per day    rOPINIRole  1 mg Oral BID       MEDS (infusions):   heparin (PORCINE) Infusion 10 Units/kg/hr (06/05/23 0842)       MEDS (prn):  LORazepam, bisacodyl, bisacodyl, heparin (porcine), heparin (porcine), acetaminophen, polyethylene glycol, prochlorperazine    PHYSICAL EXAM:     Patient Vitals for
Echo with Definity complete.   Nica Pitts
Nurse to nurse called to Mary Gambino at 0 [facility] heart cath lab
Patient complaining of chest pain as before  around 0505, mid chest radiating to back , charge nurse made aware. Vitals stable, see vital sign charting, oxygen increased to 4L  per patient request, charge nurse made aware. 0515, pulse ox 96 on 4l, patient states pain  almost gone. Charge nurse Cyndi Geller made aware.
Per Dr. Autumn Moreira is for heart cath tomorrow 6/6.
Physician Progress Note      PATIENT:               Adriana Wolf  CSN #:                  [id number]  :                       1960  ADMIT DATE:       2023 8:48 AM  DISCH DATE:  Cindy Murray  PROVIDER #:        Amy Richards DO          QUERY TEXT:    Dear Dr. Sonia Hernández,    Pt admitted with chest pain and elevated troponin. Noted documentation of   \"likely NSTEMI\" on 6/3 consult note by ordered Cardiology consultant. If   possible, please document in progress notes and discharge summary:    The medical record reflects the following:  Risk Factors: ESRD on HD, Cirrhosis, Chronic anemia, Obesity, HTN  Clinical Indicators: per Cardiology consult note 6/3: \"Elevated Troponin -   Likely NSTEMI  - Continue Nitro paste and Heparin, monitor Platelet counts -   No ASA due to TCP. Add low dose BB - Monitor BP and HR. LDL normal and hold on   Statin due to Hx of Cirrhosis, her LFTs OK. EKG inferolateral ST/T   abnormality. \"; troponin 35, 47, 49  Treatment: telemetry monitoring, Cardiology and Renal consults, serial HS   troponins, EKG, Echo, Nitro paste and IV Heparin, add low dose BB, transfer   for heart cath    Thank You,  Marcia Mack RN, BSN, CDS  Clinical Documentation Improvement Specialist  Options provided:  -- NSTEMI confirmed present on admission  -- NSTEMI ruled out  -- Other - I will add my own diagnosis  -- Disagree - Not applicable / Not valid  -- Disagree - Clinically unable to determine / Unknown  -- Refer to Clinical Documentation Reviewer    PROVIDER RESPONSE TEXT:    See cardiology note    Query created by: Gutierrez Pimentel on 2023 10:18 AM      Electronically signed by:  Amy Richards DO 2023 11:06 AM
Transport set up with PA for 8 am PU for 9 am cath per [facility] request
(IBW): 115 lbs (52 kg)       Current Body Weight: 205 lb 0.4 oz (93 kg) (6/3), 178.3 % IBW. Weight Source: Not Specified  Current BMI (kg/m2): 36.3  Usual Body Weight:  (Lack of wt hx to trend. Subjective losses per pt 245# to223# to 217# to 202# in unspecified time frame.)     Weight Adjustment For: No Adjustment                 BMI Categories: Obese Class 2 (BMI 35.0 -39.9)    Estimated Daily Nutrient Needs:  Energy Requirements Based On: Formula  Weight Used for Energy Requirements: Current  Energy (kcal/day):   Weight Used for Protein Requirements: Ideal  Protein (g/day): 75-90 (1.4-1.7 gm pro/kg IBW)  Method Used for Fluid Requirements: 1 ml/kcal  Fluid (ml/day):     Nutrition Diagnosis:   Inadequate oral intake related to increase demand for energy/nutrients as evidenced by poor intake prior to admission, dialysis    Nutrition Interventions:   Food and/or Nutrient Delivery: Start Oral Nutrition Supplement, Modify Current Diet (Rec: Phos, K+ restriction d/t elevated labs. Pt agreeable to ONS ( Gelatein BID, Magic Cup BID; appropriate for renal diet) Will monitor)  Nutrition Education/Counseling: No recommendation at this time  Coordination of Nutrition Care: Continue to monitor while inpatient       Goals:     Goals: PO intake 75% or greater, by next RD assessment       Nutrition Monitoring and Evaluation:   Behavioral-Environmental Outcomes: None Identified  Food/Nutrient Intake Outcomes: Food and Nutrient Intake, Supplement Intake  Physical Signs/Symptoms Outcomes: Biochemical Data, Chewing or Swallowing, GI Status, Fluid Status or Edema, Hemodynamic Status, Nutrition Focused Physical Findings, Skin, Weight    Discharge Planning:     Too soon to determine     [employer], RD  Contact: 1400
AST 23 20   ALT 20 18   LABALBU 3.6 3.9       Current Inpatient Medications:   tacrolimus  3 mg Oral BID    arformoterol tartrate  15 mcg Nebulization BID    And    budesonide  0.5 mg Nebulization BID    ipratropium 0.5 mg-albuterol 2.5 mg  1 Dose Inhalation Q6H    primidone  50 mg Oral Daily    metoprolol tartrate  25 mg Oral BID    senna  2 tablet Oral BID    citalopram  40 mg Oral Daily    pantoprazole  40 mg Oral Daily    entecavir  0.5 mg Oral Every Other Day    nitroglycerin  0.5 inch Topical 4 times per day    rOPINIRole  1 mg Oral BID    budesonide  0.5 mg Nebulization BID       IV Infusions (if any):   heparin (PORCINE) Infusion 10 Units/kg/hr (23 0842)         PHYSICAL EXAM:     CONSTITUTIONAL:   /66   Pulse 57   Temp 98.4 °F (36.9 °C)   Resp 18   Ht 5' 3\" (1.6 m)   Wt 201 lb 8 oz (91.4 kg)   SpO2 96%   BMI 35.69 kg/m²   Pulse  Av.7  Min: 54  Max: 248  Systolic (09SWR), TBF:504 , Min:118 , IOQ:514    Diastolic (61YXZ), QJO:77, Min:51, Max:66  Appearance: Awake, alert and oriented x 3, no acute respiratory distress  Skin: Intact, no rash  Head: Normocephalic, atraumatic  Eyes: EOMI, no conjunctival erythema  ENMT: No pharyngeal erythema, MMM, no rhinorrhea, no dentures  Neck: Supple, no elevated JVP, no carotid bruits  Lungs: Decreased BS B/L, no wheezing  Cardiac: Regular rate and rhythm, +3/4 systolic murmur  Abdomen: Soft, nontender, +bowel sounds  Extremities: Moves all extremities x 4, no lower extremity edema  Neurologic: No focal motor deficits apparent, normal mood and affect       IMPRESSION / RECOMMENDATIONS:     Elevated Troponin / chest pain / SOB / abnormal EKG     ESRD - On HD     Chronic anemia / thrombocytopenia -- most recent Hgb 10.7, plt 89    HTN -- controlled    HLD    BMI 35.7    - Cardiac catheterization pending  - Echocardiogram today  - Continue current medications  - Monitor labs  - Aggressive risk factor modifications  - Telemetry reviewed (SR)      Sendy Peñaloza
elevated troponin  2. Chronic renal failure with hemodialysis  3. Severe hypocalcemia also associated with some hypoalbuminemia  4. Metabolic acidosis with CO2 of 15  5. History of cirrhosis  6. Hypertension  7. Acute on chronic hypoxic respiratory failure  8. Pancytopenia  9. New onset generalized tremor while in hospital-possibly related to aerosol treatment  10. COPD with possible exacerbation  11. Hyperlipidemia      Plan:  Admit to monitored bed  Home medication reviewed  Activity of the as tolerated  General diet  Consult cardiology  Consult nephrology  Patient started on heparin drip in the ED  Venous ph  Consult neurology    Symbicort 160/4.5-2 puffs twice daily  Combivent Respimat  Transferred to [facility] 6/6 for heart catheterization    CMP, CBC in aedgar Eli DO, DAMARIS  6/5/2023  10:37 AM

## 2023-08-15 ENCOUNTER — TRANSCRIBE ORDERS (OUTPATIENT)
Dept: ADMINISTRATIVE | Age: 63
End: 2023-08-15

## 2023-08-15 DIAGNOSIS — R00.2 PALPITATIONS: ICD-10-CM

## 2023-08-15 DIAGNOSIS — R06.02 SHORTNESS OF BREATH: Primary | ICD-10-CM

## 2023-08-15 DIAGNOSIS — B18.1 CHRONIC HEPATITIS B WITHOUT DELTA AGENT WITHOUT HEPATIC COMA (HCC): Primary | ICD-10-CM

## 2023-08-22 ENCOUNTER — HOSPITAL ENCOUNTER (OUTPATIENT)
Dept: ULTRASOUND IMAGING | Age: 63
Discharge: HOME | End: 2023-08-22
Payer: MEDICARE

## 2023-08-22 DIAGNOSIS — B18.1 CHRONIC HEPATITIS B WITHOUT DELTA AGENT WITHOUT HEPATIC COMA (HCC): ICD-10-CM

## 2023-08-22 PROCEDURE — 76705 ECHO EXAM OF ABDOMEN: CPT

## 2023-09-29 ENCOUNTER — HOSPITAL ENCOUNTER (INPATIENT)
Age: 63
LOS: 5 days | Discharge: NURSING, REHAB OR LONG-TERM CARE | DRG: 312 | End: 2023-10-04
Attending: STUDENT IN AN ORGANIZED HEALTH CARE EDUCATION/TRAINING PROGRAM | Admitting: INTERNAL MEDICINE
Payer: MEDICARE

## 2023-09-29 ENCOUNTER — APPOINTMENT (OUTPATIENT)
Dept: CT IMAGING | Age: 63
DRG: 312 | End: 2023-09-29
Payer: MEDICARE

## 2023-09-29 ENCOUNTER — APPOINTMENT (OUTPATIENT)
Dept: GENERAL RADIOLOGY | Age: 63
DRG: 312 | End: 2023-09-29
Payer: MEDICARE

## 2023-09-29 DIAGNOSIS — I95.1 ORTHOSTATIC HYPOTENSION: Primary | ICD-10-CM

## 2023-09-29 LAB
ALBUMIN SERPL-MCNC: 4.2 G/DL (ref 3.5–5.2)
ALP SERPL-CCNC: 62 U/L (ref 35–104)
ALT SERPL-CCNC: 19 U/L (ref 0–32)
ANION GAP SERPL CALCULATED.3IONS-SCNC: 11 MMOL/L (ref 7–16)
AST SERPL-CCNC: 24 U/L (ref 0–31)
BASOPHILS # BLD: 0.03 K/UL (ref 0–0.2)
BASOPHILS NFR BLD: 1 % (ref 0–2)
BILIRUB DIRECT SERPL-MCNC: <0.2 MG/DL (ref 0–0.3)
BILIRUB INDIRECT SERPL-MCNC: NORMAL MG/DL (ref 0–1)
BILIRUB SERPL-MCNC: 0.5 MG/DL (ref 0–1.2)
BUN SERPL-MCNC: 19 MG/DL (ref 6–23)
CALCIUM SERPL-MCNC: 9.4 MG/DL (ref 8.6–10.2)
CHLORIDE SERPL-SCNC: 93 MMOL/L (ref 98–107)
CO2 SERPL-SCNC: 33 MMOL/L (ref 22–29)
CREAT SERPL-MCNC: 4.2 MG/DL (ref 0.5–1)
EOSINOPHIL # BLD: 0.12 K/UL (ref 0.05–0.5)
EOSINOPHILS RELATIVE PERCENT: 2 % (ref 0–6)
ERYTHROCYTE [DISTWIDTH] IN BLOOD BY AUTOMATED COUNT: 13.9 % (ref 11.5–15)
GFR SERPL CREATININE-BSD FRML MDRD: 12 ML/MIN/1.73M2
GLUCOSE SERPL-MCNC: 84 MG/DL (ref 74–99)
HCT VFR BLD AUTO: 31.8 % (ref 34–48)
HGB BLD-MCNC: 10.9 G/DL (ref 11.5–15.5)
IMM GRANULOCYTES # BLD AUTO: 0.03 K/UL (ref 0–0.58)
IMM GRANULOCYTES NFR BLD: 1 % (ref 0–5)
LACTATE BLDV-SCNC: 1.1 MMOL/L (ref 0.5–2.2)
LIPASE SERPL-CCNC: 17 U/L (ref 13–60)
LYMPHOCYTES NFR BLD: 1.37 K/UL (ref 1.5–4)
LYMPHOCYTES RELATIVE PERCENT: 21 % (ref 20–42)
MCH RBC QN AUTO: 35 PG (ref 26–35)
MCHC RBC AUTO-ENTMCNC: 34.3 G/DL (ref 32–34.5)
MCV RBC AUTO: 102.3 FL (ref 80–99.9)
MONOCYTES NFR BLD: 0.44 K/UL (ref 0.1–0.95)
MONOCYTES NFR BLD: 7 % (ref 2–12)
NEUTROPHILS NFR BLD: 70 % (ref 43–80)
NEUTS SEG NFR BLD: 4.62 K/UL (ref 1.8–7.3)
PLATELET # BLD AUTO: 129 K/UL (ref 130–450)
PMV BLD AUTO: 9.8 FL (ref 7–12)
POTASSIUM SERPL-SCNC: 4.4 MMOL/L (ref 3.5–5)
PROT SERPL-MCNC: 8 G/DL (ref 6.4–8.3)
RBC # BLD AUTO: 3.11 M/UL (ref 3.5–5.5)
SARS-COV-2 RDRP RESP QL NAA+PROBE: NOT DETECTED
SODIUM SERPL-SCNC: 137 MMOL/L (ref 132–146)
SPECIMEN DESCRIPTION: NORMAL
TROPONIN I SERPL HS-MCNC: 43 NG/L (ref 0–9)
TROPONIN I SERPL HS-MCNC: 44 NG/L (ref 0–9)
TROPONIN I SERPL HS-MCNC: 44 NG/L (ref 0–9)
WBC OTHER # BLD: 6.6 K/UL (ref 4.5–11.5)

## 2023-09-29 PROCEDURE — 84484 ASSAY OF TROPONIN QUANT: CPT

## 2023-09-29 PROCEDURE — 6360000002 HC RX W HCPCS: Performed by: NURSE PRACTITIONER

## 2023-09-29 PROCEDURE — 93005 ELECTROCARDIOGRAM TRACING: CPT | Performed by: STUDENT IN AN ORGANIZED HEALTH CARE EDUCATION/TRAINING PROGRAM

## 2023-09-29 PROCEDURE — 82248 BILIRUBIN DIRECT: CPT

## 2023-09-29 PROCEDURE — 80053 COMPREHEN METABOLIC PANEL: CPT

## 2023-09-29 PROCEDURE — 36415 COLL VENOUS BLD VENIPUNCTURE: CPT

## 2023-09-29 PROCEDURE — 6370000000 HC RX 637 (ALT 250 FOR IP): Performed by: NURSE PRACTITIONER

## 2023-09-29 PROCEDURE — 2580000003 HC RX 258: Performed by: STUDENT IN AN ORGANIZED HEALTH CARE EDUCATION/TRAINING PROGRAM

## 2023-09-29 PROCEDURE — 94640 AIRWAY INHALATION TREATMENT: CPT

## 2023-09-29 PROCEDURE — 96361 HYDRATE IV INFUSION ADD-ON: CPT

## 2023-09-29 PROCEDURE — 74176 CT ABD & PELVIS W/O CONTRAST: CPT

## 2023-09-29 PROCEDURE — 71045 X-RAY EXAM CHEST 1 VIEW: CPT

## 2023-09-29 PROCEDURE — 1200000000 HC SEMI PRIVATE

## 2023-09-29 PROCEDURE — 99285 EMERGENCY DEPT VISIT HI MDM: CPT

## 2023-09-29 PROCEDURE — 85025 COMPLETE CBC W/AUTO DIFF WBC: CPT

## 2023-09-29 PROCEDURE — 83690 ASSAY OF LIPASE: CPT

## 2023-09-29 PROCEDURE — 83605 ASSAY OF LACTIC ACID: CPT

## 2023-09-29 PROCEDURE — 87635 SARS-COV-2 COVID-19 AMP PRB: CPT

## 2023-09-29 PROCEDURE — 96360 HYDRATION IV INFUSION INIT: CPT

## 2023-09-29 RX ORDER — ALBUTEROL SULFATE 2.5 MG/3ML
2.5 SOLUTION RESPIRATORY (INHALATION) EVERY 4 HOURS PRN
Status: DISCONTINUED | OUTPATIENT
Start: 2023-09-29 | End: 2023-10-04 | Stop reason: HOSPADM

## 2023-09-29 RX ORDER — FOLIC ACID/VIT B COMPLEX AND C 0.8 MG
1 TABLET ORAL DAILY
Status: DISCONTINUED | OUTPATIENT
Start: 2023-09-30 | End: 2023-10-04 | Stop reason: HOSPADM

## 2023-09-29 RX ORDER — SODIUM CHLORIDE 9 MG/ML
INJECTION, SOLUTION INTRAVENOUS PRN
Status: DISCONTINUED | OUTPATIENT
Start: 2023-09-29 | End: 2023-10-04 | Stop reason: HOSPADM

## 2023-09-29 RX ORDER — CITALOPRAM 20 MG/1
40 TABLET ORAL
Status: DISCONTINUED | OUTPATIENT
Start: 2023-09-30 | End: 2023-10-04 | Stop reason: HOSPADM

## 2023-09-29 RX ORDER — PRIMIDONE 50 MG/1
50 TABLET ORAL DAILY
Status: DISCONTINUED | OUTPATIENT
Start: 2023-09-30 | End: 2023-10-04 | Stop reason: HOSPADM

## 2023-09-29 RX ORDER — POLYETHYLENE GLYCOL 3350 17 G/17G
17 POWDER, FOR SOLUTION ORAL DAILY PRN
Status: DISCONTINUED | OUTPATIENT
Start: 2023-09-29 | End: 2023-09-30

## 2023-09-29 RX ORDER — ARFORMOTEROL TARTRATE 15 UG/2ML
15 SOLUTION RESPIRATORY (INHALATION)
Status: DISCONTINUED | OUTPATIENT
Start: 2023-09-29 | End: 2023-10-04 | Stop reason: HOSPADM

## 2023-09-29 RX ORDER — BISACODYL 10 MG
10 SUPPOSITORY, RECTAL RECTAL DAILY PRN
Status: DISCONTINUED | OUTPATIENT
Start: 2023-09-29 | End: 2023-10-04 | Stop reason: HOSPADM

## 2023-09-29 RX ORDER — ONDANSETRON 2 MG/ML
4 INJECTION INTRAMUSCULAR; INTRAVENOUS EVERY 6 HOURS PRN
Status: DISCONTINUED | OUTPATIENT
Start: 2023-09-29 | End: 2023-10-04 | Stop reason: HOSPADM

## 2023-09-29 RX ORDER — SODIUM CHLORIDE 0.9 % (FLUSH) 0.9 %
10 SYRINGE (ML) INJECTION PRN
Status: DISCONTINUED | OUTPATIENT
Start: 2023-09-29 | End: 2023-10-04 | Stop reason: HOSPADM

## 2023-09-29 RX ORDER — PANTOPRAZOLE SODIUM 40 MG/1
40 TABLET, DELAYED RELEASE ORAL
Status: DISCONTINUED | OUTPATIENT
Start: 2023-09-30 | End: 2023-10-04 | Stop reason: HOSPADM

## 2023-09-29 RX ORDER — ACETAMINOPHEN 650 MG/1
650 SUPPOSITORY RECTAL EVERY 6 HOURS PRN
Status: DISCONTINUED | OUTPATIENT
Start: 2023-09-29 | End: 2023-10-04 | Stop reason: HOSPADM

## 2023-09-29 RX ORDER — TACROLIMUS 1 MG/1
3 CAPSULE ORAL 2 TIMES DAILY
Status: DISCONTINUED | OUTPATIENT
Start: 2023-09-29 | End: 2023-10-04 | Stop reason: HOSPADM

## 2023-09-29 RX ORDER — 0.9 % SODIUM CHLORIDE 0.9 %
250 INTRAVENOUS SOLUTION INTRAVENOUS ONCE
Status: COMPLETED | OUTPATIENT
Start: 2023-09-29 | End: 2023-09-29

## 2023-09-29 RX ORDER — ACETAMINOPHEN 325 MG/1
650 TABLET ORAL EVERY 6 HOURS PRN
Status: DISCONTINUED | OUTPATIENT
Start: 2023-09-29 | End: 2023-10-04 | Stop reason: HOSPADM

## 2023-09-29 RX ORDER — ROPINIROLE 1 MG/1
1 TABLET, FILM COATED ORAL 2 TIMES DAILY
Status: DISCONTINUED | OUTPATIENT
Start: 2023-09-29 | End: 2023-10-04 | Stop reason: HOSPADM

## 2023-09-29 RX ORDER — ASPIRIN 81 MG/1
81 TABLET, CHEWABLE ORAL DAILY
Status: DISCONTINUED | OUTPATIENT
Start: 2023-09-29 | End: 2023-10-04 | Stop reason: HOSPADM

## 2023-09-29 RX ORDER — BUDESONIDE 0.5 MG/2ML
0.5 INHALANT ORAL
Status: DISCONTINUED | OUTPATIENT
Start: 2023-09-29 | End: 2023-10-04 | Stop reason: HOSPADM

## 2023-09-29 RX ORDER — ONDANSETRON 4 MG/1
4 TABLET, ORALLY DISINTEGRATING ORAL EVERY 8 HOURS PRN
Status: DISCONTINUED | OUTPATIENT
Start: 2023-09-29 | End: 2023-10-04 | Stop reason: HOSPADM

## 2023-09-29 RX ORDER — SENNOSIDES A AND B 8.6 MG/1
2 TABLET, FILM COATED ORAL DAILY
Status: DISCONTINUED | OUTPATIENT
Start: 2023-09-29 | End: 2023-09-30

## 2023-09-29 RX ORDER — ENTECAVIR 1 MG/1
0.5 TABLET, FILM COATED ORAL EVERY OTHER DAY
Status: DISCONTINUED | OUTPATIENT
Start: 2023-10-01 | End: 2023-10-04 | Stop reason: HOSPADM

## 2023-09-29 RX ORDER — 0.9 % SODIUM CHLORIDE 0.9 %
500 INTRAVENOUS SOLUTION INTRAVENOUS ONCE
Status: COMPLETED | OUTPATIENT
Start: 2023-09-29 | End: 2023-09-29

## 2023-09-29 RX ORDER — SODIUM CHLORIDE 0.9 % (FLUSH) 0.9 %
10 SYRINGE (ML) INJECTION EVERY 12 HOURS SCHEDULED
Status: DISCONTINUED | OUTPATIENT
Start: 2023-09-29 | End: 2023-10-04 | Stop reason: HOSPADM

## 2023-09-29 RX ORDER — HEPARIN SODIUM 5000 [USP'U]/ML
5000 INJECTION, SOLUTION INTRAVENOUS; SUBCUTANEOUS EVERY 8 HOURS SCHEDULED
Status: DISCONTINUED | OUTPATIENT
Start: 2023-09-29 | End: 2023-10-04 | Stop reason: HOSPADM

## 2023-09-29 RX ADMIN — SODIUM CHLORIDE 500 ML: 9 INJECTION, SOLUTION INTRAVENOUS at 19:28

## 2023-09-29 RX ADMIN — HEPARIN SODIUM 5000 UNITS: 5000 INJECTION INTRAVENOUS; SUBCUTANEOUS at 21:52

## 2023-09-29 RX ADMIN — ROPINIROLE HYDROCHLORIDE 1 MG: 1 TABLET, FILM COATED ORAL at 21:52

## 2023-09-29 RX ADMIN — TACROLIMUS 3 MG: 1 CAPSULE ORAL at 21:04

## 2023-09-29 RX ADMIN — SODIUM CHLORIDE 250 ML: 9 INJECTION, SOLUTION INTRAVENOUS at 17:22

## 2023-09-29 RX ADMIN — ARFORMOTEROL TARTRATE 15 MCG: 15 SOLUTION RESPIRATORY (INHALATION) at 20:09

## 2023-09-29 RX ADMIN — BUDESONIDE INHALATION 500 MCG: 0.5 SUSPENSION RESPIRATORY (INHALATION) at 20:09

## 2023-09-29 NOTE — ED PROVIDER NOTES
[facility]      Pt Name: Donald San  MRN: [id number]  [address] 1960  Date of evaluation: 9/29/2023  Provider: Edgardo Pang DO  PCP: Sharan Harvey DO  Note Started: 3:38 PM EDT 9/29/23    CHIEF COMPLAINT       Chief Complaint   Patient presents with    Hypotension     Was at dialysis, received full treatment, BP dropped at end of treatment, received an additional 1500ml of fluid at HD. BP now normal. Pt states she gets dizzy when standing. HISTORY OF PRESENT ILLNESS: 1 or more Elements   History From: Patient  Limitations to history : None    Donald San is a 63 y.o. female who presents to the ED due to hypotension. Patient received a full dialysis treatment earlier today and notes that her blood pressure drops when finishing treatment. She says that she dropped as low as 50s over 30s. She notes that they gave her back another 1500 mL of fluid at dialysis. Patient's blood pressure has now normalized. She endorses intermittent lightheadedness when standing recently and says that it occasionally will occur when she is sitting as well. States that over the past several months she has had intermittent nausea and vomiting as well. She endorses some generalized abdominal pain at this time. She says that she will also get intermittent chest pain and some minor shortness of breath. Patient is chronically on 3 L via nasal cannula at home. Denies any focal numbness, tingling or weakness. She has no focal neurologic deficits. She denies any recent fever or chills. Nursing Notes were all reviewed and agreed with or any disagreements were addressed in the HPI. REVIEW OF SYSTEMS :    Positives and Pertinent negatives as per HPI.      SURGICAL HISTORY     Past Surgical History:   Procedure Laterality Date    CARDIAC CATHETERIZATION  06/06/2023    [place]    CHOLECYSTECTOMY      EYE SURGERY      Bilateral eye \"old blood taken out about 2 yes ago 2019\"

## 2023-09-30 LAB
ALBUMIN SERPL-MCNC: 3.5 G/DL (ref 3.5–5.2)
ALP SERPL-CCNC: 53 U/L (ref 35–104)
ALT SERPL-CCNC: 15 U/L (ref 0–32)
ANION GAP SERPL CALCULATED.3IONS-SCNC: 15 MMOL/L (ref 7–16)
AST SERPL-CCNC: 21 U/L (ref 0–31)
BASOPHILS # BLD: 0.02 K/UL (ref 0–0.2)
BASOPHILS NFR BLD: 1 % (ref 0–2)
BILIRUB DIRECT SERPL-MCNC: <0.2 MG/DL (ref 0–0.3)
BILIRUB INDIRECT SERPL-MCNC: NORMAL MG/DL (ref 0–1)
BILIRUB SERPL-MCNC: 0.5 MG/DL (ref 0–1.2)
BUN SERPL-MCNC: 24 MG/DL (ref 6–23)
CALCIUM SERPL-MCNC: 9.1 MG/DL (ref 8.6–10.2)
CHLORIDE SERPL-SCNC: 96 MMOL/L (ref 98–107)
CO2 SERPL-SCNC: 27 MMOL/L (ref 22–29)
CREAT SERPL-MCNC: 5.3 MG/DL (ref 0.5–1)
EKG ATRIAL RATE: 63 BPM
EKG P AXIS: 45 DEGREES
EKG P-R INTERVAL: 212 MS
EKG Q-T INTERVAL: 468 MS
EKG QRS DURATION: 76 MS
EKG QTC CALCULATION (BAZETT): 478 MS
EKG R AXIS: 18 DEGREES
EKG T AXIS: 155 DEGREES
EKG VENTRICULAR RATE: 63 BPM
EOSINOPHIL # BLD: 0.11 K/UL (ref 0.05–0.5)
EOSINOPHILS RELATIVE PERCENT: 3 % (ref 0–6)
ERYTHROCYTE [DISTWIDTH] IN BLOOD BY AUTOMATED COUNT: 13.9 % (ref 11.5–15)
GFR SERPL CREATININE-BSD FRML MDRD: 9 ML/MIN/1.73M2
GLUCOSE SERPL-MCNC: 119 MG/DL (ref 74–99)
HCT VFR BLD AUTO: 30.3 % (ref 34–48)
HGB BLD-MCNC: 10 G/DL (ref 11.5–15.5)
IMM GRANULOCYTES # BLD AUTO: <0.03 K/UL (ref 0–0.58)
IMM GRANULOCYTES NFR BLD: 1 % (ref 0–5)
LYMPHOCYTES NFR BLD: 1.14 K/UL (ref 1.5–4)
LYMPHOCYTES RELATIVE PERCENT: 28 % (ref 20–42)
MAGNESIUM SERPL-MCNC: 2.1 MG/DL (ref 1.6–2.6)
MCH RBC QN AUTO: 34.6 PG (ref 26–35)
MCHC RBC AUTO-ENTMCNC: 33 G/DL (ref 32–34.5)
MCV RBC AUTO: 104.8 FL (ref 80–99.9)
MONOCYTES NFR BLD: 0.29 K/UL (ref 0.1–0.95)
MONOCYTES NFR BLD: 7 % (ref 2–12)
NEUTROPHILS NFR BLD: 62 % (ref 43–80)
NEUTS SEG NFR BLD: 2.56 K/UL (ref 1.8–7.3)
PHOSPHATE SERPL-MCNC: 4.6 MG/DL (ref 2.5–4.5)
PLATELET, FLUORESCENCE: 113 K/UL (ref 130–450)
PMV BLD AUTO: 10.2 FL (ref 7–12)
POTASSIUM SERPL-SCNC: 4.3 MMOL/L (ref 3.5–5)
PROT SERPL-MCNC: 6.9 G/DL (ref 6.4–8.3)
RBC # BLD AUTO: 2.89 M/UL (ref 3.5–5.5)
SODIUM SERPL-SCNC: 138 MMOL/L (ref 132–146)
TROPONIN I SERPL HS-MCNC: 40 NG/L (ref 0–9)
WBC OTHER # BLD: 4.1 K/UL (ref 4.5–11.5)

## 2023-09-30 PROCEDURE — 36415 COLL VENOUS BLD VENIPUNCTURE: CPT

## 2023-09-30 PROCEDURE — 6370000000 HC RX 637 (ALT 250 FOR IP): Performed by: INTERNAL MEDICINE

## 2023-09-30 PROCEDURE — 80053 COMPREHEN METABOLIC PANEL: CPT

## 2023-09-30 PROCEDURE — 2580000003 HC RX 258: Performed by: NURSE PRACTITIONER

## 2023-09-30 PROCEDURE — 82248 BILIRUBIN DIRECT: CPT

## 2023-09-30 PROCEDURE — 84100 ASSAY OF PHOSPHORUS: CPT

## 2023-09-30 PROCEDURE — 85025 COMPLETE CBC W/AUTO DIFF WBC: CPT

## 2023-09-30 PROCEDURE — 6360000002 HC RX W HCPCS: Performed by: NURSE PRACTITIONER

## 2023-09-30 PROCEDURE — 83735 ASSAY OF MAGNESIUM: CPT

## 2023-09-30 PROCEDURE — 84484 ASSAY OF TROPONIN QUANT: CPT

## 2023-09-30 PROCEDURE — 6370000000 HC RX 637 (ALT 250 FOR IP): Performed by: NURSE PRACTITIONER

## 2023-09-30 PROCEDURE — 2700000000 HC OXYGEN THERAPY PER DAY

## 2023-09-30 PROCEDURE — 1200000000 HC SEMI PRIVATE

## 2023-09-30 PROCEDURE — 93010 ELECTROCARDIOGRAM REPORT: CPT | Performed by: INTERNAL MEDICINE

## 2023-09-30 PROCEDURE — 94640 AIRWAY INHALATION TREATMENT: CPT

## 2023-09-30 RX ORDER — MECOBALAMIN 5000 MCG
5 TABLET,DISINTEGRATING ORAL NIGHTLY
Status: DISCONTINUED | OUTPATIENT
Start: 2023-09-30 | End: 2023-10-04 | Stop reason: HOSPADM

## 2023-09-30 RX ORDER — POLYETHYLENE GLYCOL 3350 17 G/17G
17 POWDER, FOR SOLUTION ORAL DAILY
Status: DISCONTINUED | OUTPATIENT
Start: 2023-09-30 | End: 2023-10-04 | Stop reason: HOSPADM

## 2023-09-30 RX ORDER — SENNOSIDES A AND B 8.6 MG/1
2 TABLET, FILM COATED ORAL 2 TIMES DAILY
Status: DISCONTINUED | OUTPATIENT
Start: 2023-09-30 | End: 2023-10-04 | Stop reason: HOSPADM

## 2023-09-30 RX ADMIN — SENNOSIDES 17.2 MG: 8.6 TABLET, FILM COATED ORAL at 08:58

## 2023-09-30 RX ADMIN — Medication 1 TABLET: at 09:00

## 2023-09-30 RX ADMIN — ARFORMOTEROL TARTRATE 15 MCG: 15 SOLUTION RESPIRATORY (INHALATION) at 06:20

## 2023-09-30 RX ADMIN — BISACODYL 10 MG: 10 SUPPOSITORY RECTAL at 14:44

## 2023-09-30 RX ADMIN — ASPIRIN 81 MG CHEWABLE TABLET 81 MG: 81 TABLET CHEWABLE at 09:02

## 2023-09-30 RX ADMIN — Medication 10 ML: at 20:04

## 2023-09-30 RX ADMIN — Medication 10 ML: at 09:08

## 2023-09-30 RX ADMIN — PANTOPRAZOLE SODIUM 40 MG: 40 TABLET, DELAYED RELEASE ORAL at 14:43

## 2023-09-30 RX ADMIN — CITALOPRAM HYDROBROMIDE 40 MG: 20 TABLET ORAL at 14:43

## 2023-09-30 RX ADMIN — TACROLIMUS 3 MG: 1 CAPSULE ORAL at 19:55

## 2023-09-30 RX ADMIN — TACROLIMUS 3 MG: 1 CAPSULE ORAL at 09:00

## 2023-09-30 RX ADMIN — PRIMIDONE 50 MG: 50 TABLET ORAL at 08:59

## 2023-09-30 RX ADMIN — BUDESONIDE INHALATION 500 MCG: 0.5 SUSPENSION RESPIRATORY (INHALATION) at 16:47

## 2023-09-30 RX ADMIN — ACETAMINOPHEN 650 MG: 325 TABLET ORAL at 08:58

## 2023-09-30 RX ADMIN — HEPARIN SODIUM 5000 UNITS: 5000 INJECTION INTRAVENOUS; SUBCUTANEOUS at 14:43

## 2023-09-30 RX ADMIN — HEPARIN SODIUM 5000 UNITS: 5000 INJECTION INTRAVENOUS; SUBCUTANEOUS at 21:42

## 2023-09-30 RX ADMIN — ROPINIROLE HYDROCHLORIDE 1 MG: 1 TABLET, FILM COATED ORAL at 08:58

## 2023-09-30 RX ADMIN — BUDESONIDE INHALATION 500 MCG: 0.5 SUSPENSION RESPIRATORY (INHALATION) at 06:20

## 2023-09-30 RX ADMIN — ROPINIROLE HYDROCHLORIDE 1 MG: 1 TABLET, FILM COATED ORAL at 19:56

## 2023-09-30 RX ADMIN — ARFORMOTEROL TARTRATE 15 MCG: 15 SOLUTION RESPIRATORY (INHALATION) at 16:47

## 2023-09-30 RX ADMIN — METOPROLOL TARTRATE 25 MG: 25 TABLET, FILM COATED ORAL at 08:59

## 2023-09-30 RX ADMIN — SENNOSIDES 17.2 MG: 8.6 TABLET, FILM COATED ORAL at 19:56

## 2023-09-30 RX ADMIN — HEPARIN SODIUM 5000 UNITS: 5000 INJECTION INTRAVENOUS; SUBCUTANEOUS at 05:30

## 2023-09-30 RX ADMIN — Medication 5 MG: at 19:56

## 2023-09-30 ASSESSMENT — PAIN SCALES - GENERAL
PAINLEVEL_OUTOF10: 0
PAINLEVEL_OUTOF10: 3

## 2023-09-30 ASSESSMENT — PAIN - FUNCTIONAL ASSESSMENT: PAIN_FUNCTIONAL_ASSESSMENT: PREVENTS OR INTERFERES SOME ACTIVE ACTIVITIES AND ADLS

## 2023-09-30 ASSESSMENT — PAIN DESCRIPTION - PAIN TYPE: TYPE: ACUTE PAIN

## 2023-09-30 ASSESSMENT — PAIN DESCRIPTION - FREQUENCY: FREQUENCY: CONTINUOUS

## 2023-09-30 ASSESSMENT — PAIN DESCRIPTION - ORIENTATION: ORIENTATION: INNER

## 2023-09-30 ASSESSMENT — PAIN DESCRIPTION - ONSET: ONSET: GRADUAL

## 2023-09-30 ASSESSMENT — PAIN DESCRIPTION - DESCRIPTORS: DESCRIPTORS: ACHING;DISCOMFORT;SORE

## 2023-09-30 ASSESSMENT — PAIN DESCRIPTION - LOCATION: LOCATION: HEAD

## 2023-09-30 NOTE — H&P
34.91 kg meter squared  Depression/anxiety    PLAN:  Godfrey Cruz is a 42-year-old female who presented with orthostatic symptoms following dialysis. She is essentially received the volume back that was removed with dialysis. Further volume management as per nephrology. Troponin is elevated but in recent nonobstructive coronary artery disease quantified as mild by the cardiologist during her June 6 cardiac catheterization. We will repeat and assess for any upward trend, medically optimized. Hold parameters on antihypertensives. PT, OT and social work for discharge planning. Underlying co-morbidites will be addressed during hospitalization as well. Labs and vital signs will be monitored closely and addressed accordingly. See additional orders for details.      NELSON Huff CNP  8:02 PM  9/29/2023    Electronically signed by NELSON Huff CNP on 9/29/23 at 8:02 PM EDT

## 2023-10-01 ENCOUNTER — APPOINTMENT (OUTPATIENT)
Dept: ULTRASOUND IMAGING | Age: 63
DRG: 312 | End: 2023-10-01
Payer: MEDICARE

## 2023-10-01 LAB
ALBUMIN SERPL-MCNC: 3.5 G/DL (ref 3.5–5.2)
ALP SERPL-CCNC: 49 U/L (ref 35–104)
ALT SERPL-CCNC: 12 U/L (ref 0–32)
ANION GAP SERPL CALCULATED.3IONS-SCNC: 11 MMOL/L (ref 7–16)
AST SERPL-CCNC: 19 U/L (ref 0–31)
BASOPHILS # BLD: 0.02 K/UL (ref 0–0.2)
BASOPHILS NFR BLD: 1 % (ref 0–2)
BILIRUB DIRECT SERPL-MCNC: <0.2 MG/DL (ref 0–0.3)
BILIRUB INDIRECT SERPL-MCNC: NORMAL MG/DL (ref 0–1)
BILIRUB SERPL-MCNC: 0.5 MG/DL (ref 0–1.2)
BUN SERPL-MCNC: 36 MG/DL (ref 6–23)
CALCIUM SERPL-MCNC: 9.2 MG/DL (ref 8.6–10.2)
CHLORIDE SERPL-SCNC: 93 MMOL/L (ref 98–107)
CHOLEST SERPL-MCNC: 176 MG/DL
CO2 SERPL-SCNC: 31 MMOL/L (ref 22–29)
CORTIS SERPL-MCNC: 8.5 UG/DL (ref 2.7–18.4)
CORTISOL COLLECTION INFO: NORMAL
CREAT SERPL-MCNC: 7.6 MG/DL (ref 0.5–1)
EOSINOPHIL # BLD: 0.07 K/UL (ref 0.05–0.5)
EOSINOPHILS RELATIVE PERCENT: 2 % (ref 0–6)
ERYTHROCYTE [DISTWIDTH] IN BLOOD BY AUTOMATED COUNT: 13.9 % (ref 11.5–15)
FERRITIN SERPL-MCNC: 1320 NG/ML
GFR SERPL CREATININE-BSD FRML MDRD: 6 ML/MIN/1.73M2
GLUCOSE SERPL-MCNC: 86 MG/DL (ref 74–99)
HCT VFR BLD AUTO: 27.7 % (ref 34–48)
HDLC SERPL-MCNC: 49 MG/DL
HGB BLD-MCNC: 9.4 G/DL (ref 11.5–15.5)
IMM GRANULOCYTES # BLD AUTO: <0.03 K/UL (ref 0–0.58)
IMM GRANULOCYTES NFR BLD: 1 % (ref 0–5)
INR PPP: 1
IRON SATN MFR SERPL: 38 % (ref 15–50)
IRON SERPL-MCNC: 82 UG/DL (ref 37–145)
LDLC SERPL CALC-MCNC: 107 MG/DL
LIPASE SERPL-CCNC: 20 U/L (ref 13–60)
LYMPHOCYTES NFR BLD: 1.08 K/UL (ref 1.5–4)
LYMPHOCYTES RELATIVE PERCENT: 31 % (ref 20–42)
MAGNESIUM SERPL-MCNC: 2.3 MG/DL (ref 1.6–2.6)
MCH RBC QN AUTO: 35.2 PG (ref 26–35)
MCHC RBC AUTO-ENTMCNC: 33.9 G/DL (ref 32–34.5)
MCV RBC AUTO: 103.7 FL (ref 80–99.9)
MONOCYTES NFR BLD: 0.19 K/UL (ref 0.1–0.95)
MONOCYTES NFR BLD: 6 % (ref 2–12)
NEUTROPHILS NFR BLD: 60 % (ref 43–80)
NEUTS SEG NFR BLD: 2.1 K/UL (ref 1.8–7.3)
PHOSPHATE SERPL-MCNC: 6.5 MG/DL (ref 2.5–4.5)
PLATELET # BLD AUTO: 105 K/UL (ref 130–450)
PMV BLD AUTO: 10.2 FL (ref 7–12)
POTASSIUM SERPL-SCNC: 5.3 MMOL/L (ref 3.5–5)
PROT SERPL-MCNC: 6.6 G/DL (ref 6.4–8.3)
PROTHROMBIN TIME: 11 SEC (ref 9.3–12.4)
RBC # BLD AUTO: 2.67 M/UL (ref 3.5–5.5)
SODIUM SERPL-SCNC: 135 MMOL/L (ref 132–146)
T4 FREE SERPL-MCNC: 1.2 NG/DL (ref 0.9–1.7)
TIBC SERPL-MCNC: 217 UG/DL (ref 250–450)
TRIGL SERPL-MCNC: 102 MG/DL
TSH SERPL DL<=0.05 MIU/L-ACNC: 1.1 UIU/ML (ref 0.27–4.2)
VLDLC SERPL CALC-MCNC: 20 MG/DL
WBC OTHER # BLD: 3.5 K/UL (ref 4.5–11.5)

## 2023-10-01 PROCEDURE — 6370000000 HC RX 637 (ALT 250 FOR IP): Performed by: NURSE PRACTITIONER

## 2023-10-01 PROCEDURE — 97161 PT EVAL LOW COMPLEX 20 MIN: CPT | Performed by: PHYSICAL THERAPIST

## 2023-10-01 PROCEDURE — 82533 TOTAL CORTISOL: CPT

## 2023-10-01 PROCEDURE — 85610 PROTHROMBIN TIME: CPT

## 2023-10-01 PROCEDURE — 97530 THERAPEUTIC ACTIVITIES: CPT | Performed by: PHYSICAL THERAPIST

## 2023-10-01 PROCEDURE — 6360000002 HC RX W HCPCS: Performed by: NURSE PRACTITIONER

## 2023-10-01 PROCEDURE — 83690 ASSAY OF LIPASE: CPT

## 2023-10-01 PROCEDURE — 82248 BILIRUBIN DIRECT: CPT

## 2023-10-01 PROCEDURE — 94640 AIRWAY INHALATION TREATMENT: CPT

## 2023-10-01 PROCEDURE — 2700000000 HC OXYGEN THERAPY PER DAY

## 2023-10-01 PROCEDURE — 6370000000 HC RX 637 (ALT 250 FOR IP): Performed by: INTERNAL MEDICINE

## 2023-10-01 PROCEDURE — 84439 ASSAY OF FREE THYROXINE: CPT

## 2023-10-01 PROCEDURE — 1200000000 HC SEMI PRIVATE

## 2023-10-01 PROCEDURE — 36415 COLL VENOUS BLD VENIPUNCTURE: CPT

## 2023-10-01 PROCEDURE — 80053 COMPREHEN METABOLIC PANEL: CPT

## 2023-10-01 PROCEDURE — 83735 ASSAY OF MAGNESIUM: CPT

## 2023-10-01 PROCEDURE — 82728 ASSAY OF FERRITIN: CPT

## 2023-10-01 PROCEDURE — 76705 ECHO EXAM OF ABDOMEN: CPT

## 2023-10-01 PROCEDURE — 80074 ACUTE HEPATITIS PANEL: CPT

## 2023-10-01 PROCEDURE — 2580000003 HC RX 258: Performed by: NURSE PRACTITIONER

## 2023-10-01 PROCEDURE — 80061 LIPID PANEL: CPT

## 2023-10-01 PROCEDURE — 85025 COMPLETE CBC W/AUTO DIFF WBC: CPT

## 2023-10-01 PROCEDURE — 83550 IRON BINDING TEST: CPT

## 2023-10-01 PROCEDURE — 84443 ASSAY THYROID STIM HORMONE: CPT

## 2023-10-01 PROCEDURE — 83540 ASSAY OF IRON: CPT

## 2023-10-01 PROCEDURE — 84100 ASSAY OF PHOSPHORUS: CPT

## 2023-10-01 RX ORDER — LINACLOTIDE 290 UG/1
290 CAPSULE, GELATIN COATED ORAL
COMMUNITY

## 2023-10-01 RX ORDER — MIDODRINE HYDROCHLORIDE 5 MG/1
2.5 TABLET ORAL
Status: DISCONTINUED | OUTPATIENT
Start: 2023-10-01 | End: 2023-10-02

## 2023-10-01 RX ORDER — MIDODRINE HYDROCHLORIDE 5 MG/1
2.5 TABLET ORAL
Status: DISCONTINUED | OUTPATIENT
Start: 2023-10-01 | End: 2023-10-01 | Stop reason: SDUPTHER

## 2023-10-01 RX ADMIN — SENNOSIDES 17.2 MG: 8.6 TABLET, FILM COATED ORAL at 09:43

## 2023-10-01 RX ADMIN — Medication 10 ML: at 21:27

## 2023-10-01 RX ADMIN — Medication 10 ML: at 09:49

## 2023-10-01 RX ADMIN — PRIMIDONE 50 MG: 50 TABLET ORAL at 09:43

## 2023-10-01 RX ADMIN — PANTOPRAZOLE SODIUM 40 MG: 40 TABLET, DELAYED RELEASE ORAL at 14:22

## 2023-10-01 RX ADMIN — ARFORMOTEROL TARTRATE 15 MCG: 15 SOLUTION RESPIRATORY (INHALATION) at 17:43

## 2023-10-01 RX ADMIN — ENTECAVIR 0.5 MG: 1 TABLET ORAL at 09:42

## 2023-10-01 RX ADMIN — ARFORMOTEROL TARTRATE 15 MCG: 15 SOLUTION RESPIRATORY (INHALATION) at 06:01

## 2023-10-01 RX ADMIN — CITALOPRAM HYDROBROMIDE 40 MG: 20 TABLET ORAL at 14:22

## 2023-10-01 RX ADMIN — BUDESONIDE INHALATION 500 MCG: 0.5 SUSPENSION RESPIRATORY (INHALATION) at 17:43

## 2023-10-01 RX ADMIN — MAGNESIUM HYDROXIDE 30 ML: 400 SUSPENSION ORAL at 09:45

## 2023-10-01 RX ADMIN — BUDESONIDE INHALATION 500 MCG: 0.5 SUSPENSION RESPIRATORY (INHALATION) at 06:00

## 2023-10-01 RX ADMIN — HEPARIN SODIUM 5000 UNITS: 5000 INJECTION INTRAVENOUS; SUBCUTANEOUS at 14:23

## 2023-10-01 RX ADMIN — MIDODRINE HYDROCHLORIDE 2.5 MG: 5 TABLET ORAL at 14:22

## 2023-10-01 RX ADMIN — ACETAMINOPHEN 650 MG: 325 TABLET ORAL at 17:41

## 2023-10-01 RX ADMIN — SENNOSIDES 17.2 MG: 8.6 TABLET, FILM COATED ORAL at 21:18

## 2023-10-01 RX ADMIN — METOPROLOL TARTRATE 12.5 MG: 25 TABLET, FILM COATED ORAL at 09:43

## 2023-10-01 RX ADMIN — TACROLIMUS 3 MG: 1 CAPSULE ORAL at 09:43

## 2023-10-01 RX ADMIN — ASPIRIN 81 MG CHEWABLE TABLET 81 MG: 81 TABLET CHEWABLE at 09:42

## 2023-10-01 RX ADMIN — ACETAMINOPHEN 650 MG: 325 TABLET ORAL at 01:20

## 2023-10-01 RX ADMIN — HEPARIN SODIUM 5000 UNITS: 5000 INJECTION INTRAVENOUS; SUBCUTANEOUS at 21:21

## 2023-10-01 RX ADMIN — ROPINIROLE HYDROCHLORIDE 1 MG: 1 TABLET, FILM COATED ORAL at 09:42

## 2023-10-01 RX ADMIN — TACROLIMUS 3 MG: 1 CAPSULE ORAL at 21:18

## 2023-10-01 RX ADMIN — Medication 5 MG: at 21:18

## 2023-10-01 RX ADMIN — ROPINIROLE HYDROCHLORIDE 1 MG: 1 TABLET, FILM COATED ORAL at 21:18

## 2023-10-01 RX ADMIN — Medication 0.8 MG: at 09:42

## 2023-10-01 ASSESSMENT — PAIN DESCRIPTION - ORIENTATION
ORIENTATION: LEFT;MID;UPPER
ORIENTATION: RIGHT;LEFT
ORIENTATION: ANTERIOR

## 2023-10-01 ASSESSMENT — PAIN SCALES - GENERAL
PAINLEVEL_OUTOF10: 7
PAINLEVEL_OUTOF10: 5
PAINLEVEL_OUTOF10: 7

## 2023-10-01 ASSESSMENT — PAIN - FUNCTIONAL ASSESSMENT: PAIN_FUNCTIONAL_ASSESSMENT: PREVENTS OR INTERFERES SOME ACTIVE ACTIVITIES AND ADLS

## 2023-10-01 ASSESSMENT — PAIN DESCRIPTION - DESCRIPTORS
DESCRIPTORS: ACHING;DULL;THROBBING
DESCRIPTORS: ACHING
DESCRIPTORS: ACHING

## 2023-10-01 ASSESSMENT — PAIN DESCRIPTION - PAIN TYPE: TYPE: ACUTE PAIN

## 2023-10-01 ASSESSMENT — PAIN DESCRIPTION - LOCATION
LOCATION: HEAD
LOCATION: HEAD
LOCATION: ABDOMEN

## 2023-10-01 NOTE — PLAN OF CARE
Problem: Safety - Adult  Goal: Free from fall injury  10/1/2023 1720 by Vikas Dalton RN  Outcome: Progressing     Problem: Pain  Goal: Verbalizes/displays adequate comfort level or baseline comfort level  10/1/2023 1720 by Vikas Dalton RN  Outcome: Progressing

## 2023-10-01 NOTE — PLAN OF CARE
Problem: Safety - Adult  Goal: Free from fall injury  9/30/2023 2211 by Jackeline Mancilla RN  Outcome: Progressing       Problem: Pain  Goal: Verbalizes/displays adequate comfort level or baseline comfort level  9/30/2023 2211 by Jackeline Mancilla RN  Outcome: Progressing

## 2023-10-02 LAB
ALBUMIN SERPL-MCNC: 3.5 G/DL (ref 3.5–5.2)
ALP SERPL-CCNC: 49 U/L (ref 35–104)
ALT SERPL-CCNC: 12 U/L (ref 0–32)
ANION GAP SERPL CALCULATED.3IONS-SCNC: 14 MMOL/L (ref 7–16)
AST SERPL-CCNC: 18 U/L (ref 0–31)
BASOPHILS # BLD: 0 K/UL (ref 0–0.2)
BASOPHILS NFR BLD: 0 % (ref 0–2)
BILIRUB DIRECT SERPL-MCNC: <0.2 MG/DL (ref 0–0.3)
BILIRUB INDIRECT SERPL-MCNC: ABNORMAL MG/DL (ref 0–1)
BILIRUB SERPL-MCNC: 0.4 MG/DL (ref 0–1.2)
BUN SERPL-MCNC: 47 MG/DL (ref 6–23)
CALCIUM SERPL-MCNC: 9 MG/DL (ref 8.6–10.2)
CHLORIDE SERPL-SCNC: 91 MMOL/L (ref 98–107)
CO2 SERPL-SCNC: 26 MMOL/L (ref 22–29)
CREAT SERPL-MCNC: 9.2 MG/DL (ref 0.5–1)
CRP SERPL HS-MCNC: 4 MG/L (ref 0–5)
EOSINOPHIL # BLD: 0.03 K/UL (ref 0.05–0.5)
EOSINOPHILS RELATIVE PERCENT: 1 % (ref 0–6)
ERYTHROCYTE [DISTWIDTH] IN BLOOD BY AUTOMATED COUNT: 14.1 % (ref 11.5–15)
GFR SERPL CREATININE-BSD FRML MDRD: 5 ML/MIN/1.73M2
GLUCOSE SERPL-MCNC: 141 MG/DL (ref 74–99)
HAV IGM SERPL QL IA: NONREACTIVE
HBV CORE IGM SERPL QL IA: NONREACTIVE
HBV SURFACE AG SERPL QL IA: NONREACTIVE
HCT VFR BLD AUTO: 26.5 % (ref 34–48)
HCV AB SERPL QL IA: NONREACTIVE
HGB BLD-MCNC: 9 G/DL (ref 11.5–15.5)
LYMPHOCYTES NFR BLD: 0.89 K/UL (ref 1.5–4)
LYMPHOCYTES RELATIVE PERCENT: 25 % (ref 20–42)
MAGNESIUM SERPL-MCNC: 2.3 MG/DL (ref 1.6–2.6)
MCH RBC QN AUTO: 34.7 PG (ref 26–35)
MCHC RBC AUTO-ENTMCNC: 34 G/DL (ref 32–34.5)
MCV RBC AUTO: 102.3 FL (ref 80–99.9)
MONOCYTES NFR BLD: 0.1 K/UL (ref 0.1–0.95)
MONOCYTES NFR BLD: 3 % (ref 2–12)
MYELOCYTES ABSOLUTE COUNT: 0.03 K/UL
MYELOCYTES: 1 %
NEUTROPHILS NFR BLD: 71 % (ref 43–80)
NEUTS SEG NFR BLD: 2.55 K/UL (ref 1.8–7.3)
PHOSPHATE SERPL-MCNC: 6.5 MG/DL (ref 2.5–4.5)
PLATELET # BLD AUTO: 96 K/UL (ref 130–450)
PLATELET CONFIRMATION: NORMAL
PMV BLD AUTO: 10.2 FL (ref 7–12)
POTASSIUM SERPL-SCNC: 4.9 MMOL/L (ref 3.5–5)
PROCALCITONIN SERPL-MCNC: 0.38 NG/ML (ref 0–0.08)
PROT SERPL-MCNC: 6.3 G/DL (ref 6.4–8.3)
RBC # BLD AUTO: 2.59 M/UL (ref 3.5–5.5)
RBC # BLD: NORMAL 10*6/UL
SODIUM SERPL-SCNC: 131 MMOL/L (ref 132–146)
WBC OTHER # BLD: 3.6 K/UL (ref 4.5–11.5)

## 2023-10-02 PROCEDURE — 6360000002 HC RX W HCPCS: Performed by: NURSE PRACTITIONER

## 2023-10-02 PROCEDURE — 6370000000 HC RX 637 (ALT 250 FOR IP): Performed by: INTERNAL MEDICINE

## 2023-10-02 PROCEDURE — 94640 AIRWAY INHALATION TREATMENT: CPT

## 2023-10-02 PROCEDURE — 84145 PROCALCITONIN (PCT): CPT

## 2023-10-02 PROCEDURE — 36415 COLL VENOUS BLD VENIPUNCTURE: CPT

## 2023-10-02 PROCEDURE — 1200000000 HC SEMI PRIVATE

## 2023-10-02 PROCEDURE — 90935 HEMODIALYSIS ONE EVALUATION: CPT

## 2023-10-02 PROCEDURE — 6370000000 HC RX 637 (ALT 250 FOR IP): Performed by: NURSE PRACTITIONER

## 2023-10-02 PROCEDURE — 86140 C-REACTIVE PROTEIN: CPT

## 2023-10-02 PROCEDURE — 2580000003 HC RX 258: Performed by: NURSE PRACTITIONER

## 2023-10-02 PROCEDURE — 2700000000 HC OXYGEN THERAPY PER DAY

## 2023-10-02 PROCEDURE — 83735 ASSAY OF MAGNESIUM: CPT

## 2023-10-02 PROCEDURE — 97530 THERAPEUTIC ACTIVITIES: CPT

## 2023-10-02 PROCEDURE — 5A1D70Z PERFORMANCE OF URINARY FILTRATION, INTERMITTENT, LESS THAN 6 HOURS PER DAY: ICD-10-PCS | Performed by: INTERNAL MEDICINE

## 2023-10-02 PROCEDURE — 84100 ASSAY OF PHOSPHORUS: CPT

## 2023-10-02 PROCEDURE — 82248 BILIRUBIN DIRECT: CPT

## 2023-10-02 PROCEDURE — 80053 COMPREHEN METABOLIC PANEL: CPT

## 2023-10-02 PROCEDURE — 85025 COMPLETE CBC W/AUTO DIFF WBC: CPT

## 2023-10-02 RX ORDER — MIDODRINE HYDROCHLORIDE 5 MG/1
5 TABLET ORAL
Status: DISCONTINUED | OUTPATIENT
Start: 2023-10-02 | End: 2023-10-04 | Stop reason: HOSPADM

## 2023-10-02 RX ADMIN — ROPINIROLE HYDROCHLORIDE 1 MG: 1 TABLET, FILM COATED ORAL at 08:02

## 2023-10-02 RX ADMIN — Medication 1 TABLET: at 13:31

## 2023-10-02 RX ADMIN — TACROLIMUS 3 MG: 1 CAPSULE ORAL at 13:31

## 2023-10-02 RX ADMIN — CITALOPRAM HYDROBROMIDE 40 MG: 20 TABLET ORAL at 13:32

## 2023-10-02 RX ADMIN — Medication 5 MG: at 20:25

## 2023-10-02 RX ADMIN — Medication 10 ML: at 07:47

## 2023-10-02 RX ADMIN — MIDODRINE HYDROCHLORIDE 5 MG: 5 TABLET ORAL at 13:33

## 2023-10-02 RX ADMIN — ARFORMOTEROL TARTRATE 15 MCG: 15 SOLUTION RESPIRATORY (INHALATION) at 16:01

## 2023-10-02 RX ADMIN — SENNOSIDES 17.2 MG: 8.6 TABLET, FILM COATED ORAL at 20:25

## 2023-10-02 RX ADMIN — BUDESONIDE INHALATION 500 MCG: 0.5 SUSPENSION RESPIRATORY (INHALATION) at 16:01

## 2023-10-02 RX ADMIN — HEPARIN SODIUM 5000 UNITS: 5000 INJECTION INTRAVENOUS; SUBCUTANEOUS at 13:33

## 2023-10-02 RX ADMIN — ARFORMOTEROL TARTRATE 15 MCG: 15 SOLUTION RESPIRATORY (INHALATION) at 06:04

## 2023-10-02 RX ADMIN — ASPIRIN 81 MG CHEWABLE TABLET 81 MG: 81 TABLET CHEWABLE at 13:29

## 2023-10-02 RX ADMIN — PANTOPRAZOLE SODIUM 40 MG: 40 TABLET, DELAYED RELEASE ORAL at 13:31

## 2023-10-02 RX ADMIN — MIDODRINE HYDROCHLORIDE 2.5 MG: 5 TABLET ORAL at 07:47

## 2023-10-02 RX ADMIN — BUDESONIDE INHALATION 500 MCG: 0.5 SUSPENSION RESPIRATORY (INHALATION) at 06:04

## 2023-10-02 RX ADMIN — MIDODRINE HYDROCHLORIDE 5 MG: 5 TABLET ORAL at 16:50

## 2023-10-02 RX ADMIN — TACROLIMUS 3 MG: 1 CAPSULE ORAL at 20:25

## 2023-10-02 RX ADMIN — ACETAMINOPHEN 650 MG: 325 TABLET ORAL at 13:37

## 2023-10-02 RX ADMIN — Medication 10 ML: at 20:28

## 2023-10-02 RX ADMIN — HEPARIN SODIUM 5000 UNITS: 5000 INJECTION INTRAVENOUS; SUBCUTANEOUS at 06:21

## 2023-10-02 RX ADMIN — ROPINIROLE HYDROCHLORIDE 1 MG: 1 TABLET, FILM COATED ORAL at 20:25

## 2023-10-02 ASSESSMENT — PAIN SCALES - GENERAL: PAINLEVEL_OUTOF10: 6

## 2023-10-02 NOTE — ACP (ADVANCE CARE PLANNING)
Advance Care Planning   Healthcare Decision Maker:    Primary Decision Maker: Magdalena Eaton - [facility] - [phone]    Today we documented Decision Maker(s) consistent with Legal Next of Kin hierarchy.

## 2023-10-02 NOTE — CARE COORDINATION
Case Management Assessment  Initial Evaluation    Date/Time of Evaluation: 10/2/2023 4:00 PM  Assessment Completed by: Joe Gilman RN    If patient is discharged prior to next notation, then this note serves as note for discharge by case management. Patient Name: Humera Dangelo                   YOB: 1960  Diagnosis: Orthostatic hypotension [I95.1]                   Date / Time: 9/29/2023  3:37 PM    Patient Admission Status: Inpatient   Readmission Risk (Low < 19, Mod (19-27), High > 27): Readmission Risk Score: 20.2    Current PCP: Nico Farfan, DO  PCP verified by CM? Yes    Chart Reviewed: Yes      History Provided by: Patient  Patient Orientation: Alert and Oriented    Patient Cognition: Alert    Hospitalization in the last 30 days (Readmission):  No    If yes, Readmission Assessment in CM Navigator will be completed. Advance Directives:      Code Status: Full Code   Patient's Primary Decision Maker is: Legal Next of Kin    Primary Decision Maker: Amilcar Jasonia [facility] - [phone]    Discharge Planning:    Patient lives with: Spouse/Significant Other Type of Home: House  Primary Care Giver: Self  Patient Support Systems include: Spouse/Significant Other, Parent   Current Financial resources:    Current community resources:    Current services prior to admission: Oxygen Therapy            Current DME:              Type of Home Care services:  None    ADLS  Prior functional level: Assistance with the following:, Mobility  Current functional level: Assistance with the following:, Mobility      Family can provide assistance at DC: Yes  Would you like Case Management to discuss the discharge plan with any other family members/significant others, and if so, who?  No  Plans to Return to Present Housing: Yes  Other Identified Issues/Barriers to RETURNING to current housing: weakness    Financial    Payor: Amanda Napier / Plan: [employer] HMO / Product Type: Medicare /

## 2023-10-02 NOTE — CONSULTS
[facility] MD Felix Brownlee Junior, MD Lorretta Fitting MD .  Consultation  Patient's Name: Jairo Moran  1:55 PM  9/30/2023    Nephrologist: Ron Yañez MD    Reason for Consult:  ESRD   Requesting Physician:  Rigoberto Butcher DO    Chief Complaint:  Lightheadness    History Obtained From:  patient record staff     History of Present Ilness:      74-year-old female patient who presented to [facility] for evaluation of dizziness. She has been having issues with dizziness with hemodialysis. She has been losing weight due to ongoing issues with vomiting and diarrhea that are known. Her dry weight is being adjusted. They have been giving the patient midodrine with dialysis as well    Pt seen in her room this am she is on RA her BP has been ok while supine . She reports no cp/sob . She report having headaches     Past Medical History:   Diagnosis Date    Arthritis     Cirrhosis of liver ([address])     Depression     Eye abnormalities     blood behid retina    Hernia of abdominal wall     Hypertension     Neuropathy        Past Surgical History:   Procedure Laterality Date    CARDIAC CATHETERIZATION  06/06/2023    [place]    CHOLECYSTECTOMY      EYE SURGERY      Bilateral eye \"old blood taken out about 2 yes ago 2019\"    FRACTURE SURGERY      HYSTERECTOMY (CERVIX STATUS UNKNOWN)      LIVER TRANSPLANT  2015    SHOULDER SURGERY Right     SHUNT REVISION Left 02/04/2021    AV GRAFT LEFT ARM performed by Estefania Gracia MD at [facility]. No pertinent family history. reports that she quit smoking about 4 years ago. Her smoking use included cigarettes. She smoked an average of .5 packs per day. She has never used smokeless tobacco. She reports that she does not drink alcohol and does not use drugs.     Allergies:  Tape [adhesive tape]    Current Medications:    melatonin disintegrating tablet 5 mg, Nightly  [START ON 10/1/2023] metoprolol
SHOULDER SURGERY Right     SHUNT REVISION Left 02/04/2021    AV GRAFT LEFT ARM performed by Eloisa Xie MD at [address]:  Current Facility-Administered Medications   Medication Dose Route Frequency Provider Last Rate Last Admin    midodrine (PROAMATINE) tablet 5 mg  5 mg Oral TID WC Radha Edward, DO        melatonin disintegrating tablet 5 mg  5 mg Oral Nightly Radha Edward, DO   5 mg at 10/01/23 2118    senna (SENOKOT) tablet 17.2 mg  2 tablet Oral BID Radha Edward, DO   17.2 mg at 10/01/23 2118    polyethylene glycol (GLYCOLAX) packet 17 g  17 g Oral Daily Radha Edward, DO        albuterol (PROVENTIL) (2.5 MG/3ML) 0.083% nebulizer solution 2.5 mg  2.5 mg Nebulization Q4H PRN NELSON Garcia CNP        aspirin chewable tablet 81 mg  81 mg Oral Daily NELSON Garcia CNP   81 mg at 10/01/23 3860    elfego-alphonse tablet 1 tablet  1 tablet Oral Daily NELSON Garcia CNP   0.8 mg at 10/01/23 0942    bisacodyl (DULCOLAX) suppository 10 mg  10 mg Rectal Daily PRN NELSON Garcia - CNP   10 mg at 09/30/23 1444    citalopram (CELEXA) tablet 40 mg  40 mg Oral Lunch NELSON Garcia - CNP   40 mg at 10/01/23 1422    entecavir (BARACLUDE) tablet 0.5 mg  0.5 mg Oral Every Other Day NELSON Garcia - CNP   0.5 mg at 10/01/23 0942    pantoprazole (PROTONIX) tablet 40 mg  40 mg Oral Lunch NELSON Garcia - CNP   40 mg at 10/01/23 1422    primidone (MYSOLINE) tablet 50 mg  50 mg Oral Daily NELSON Garcia - CNP   50 mg at 10/01/23 0943    rOPINIRole (REQUIP) tablet 1 mg  1 mg Oral BID NELSON Garcia - CNP   1 mg at 10/02/23 0802    tacrolimus (PROGRAF) capsule 3 mg  3 mg Oral BID NELSON Garcia - CNP   3 mg at 10/01/23 2118    arformoterol tartrate (BROVANA) nebulizer solution 15 mcg  15 mcg Nebulization BID RT NELSON Garcia CNP   15 mcg at 10/02/23 0604    budesonide (PULMICORT) nebulizer suspension 500 mcg  0.5 mg

## 2023-10-03 LAB
ALBUMIN SERPL-MCNC: 3.4 G/DL (ref 3.5–5.2)
ALP SERPL-CCNC: 51 U/L (ref 35–104)
ALT SERPL-CCNC: 13 U/L (ref 0–32)
ANION GAP SERPL CALCULATED.3IONS-SCNC: 11 MMOL/L (ref 7–16)
AST SERPL-CCNC: 23 U/L (ref 0–31)
BASOPHILS # BLD: 0.02 K/UL (ref 0–0.2)
BASOPHILS NFR BLD: 1 % (ref 0–2)
BILIRUB DIRECT SERPL-MCNC: <0.2 MG/DL (ref 0–0.3)
BILIRUB INDIRECT SERPL-MCNC: ABNORMAL MG/DL (ref 0–1)
BILIRUB SERPL-MCNC: 0.4 MG/DL (ref 0–1.2)
BUN SERPL-MCNC: 25 MG/DL (ref 6–23)
CALCIUM SERPL-MCNC: 9.1 MG/DL (ref 8.6–10.2)
CHLORIDE SERPL-SCNC: 92 MMOL/L (ref 98–107)
CO2 SERPL-SCNC: 28 MMOL/L (ref 22–29)
CREAT SERPL-MCNC: 5.7 MG/DL (ref 0.5–1)
EOSINOPHIL # BLD: 0.08 K/UL (ref 0.05–0.5)
EOSINOPHILS RELATIVE PERCENT: 2 % (ref 0–6)
ERYTHROCYTE [DISTWIDTH] IN BLOOD BY AUTOMATED COUNT: 14.1 % (ref 11.5–15)
GFR SERPL CREATININE-BSD FRML MDRD: 8 ML/MIN/1.73M2
GLUCOSE SERPL-MCNC: 93 MG/DL (ref 74–99)
HCT VFR BLD AUTO: 28.8 % (ref 34–48)
HGB BLD-MCNC: 9.8 G/DL (ref 11.5–15.5)
IMM GRANULOCYTES # BLD AUTO: 0.03 K/UL (ref 0–0.58)
IMM GRANULOCYTES NFR BLD: 1 % (ref 0–5)
LYMPHOCYTES NFR BLD: 0.85 K/UL (ref 1.5–4)
LYMPHOCYTES RELATIVE PERCENT: 23 % (ref 20–42)
MAGNESIUM SERPL-MCNC: 2.1 MG/DL (ref 1.6–2.6)
MCH RBC QN AUTO: 35 PG (ref 26–35)
MCHC RBC AUTO-ENTMCNC: 34 G/DL (ref 32–34.5)
MCV RBC AUTO: 102.9 FL (ref 80–99.9)
MONOCYTES NFR BLD: 0.21 K/UL (ref 0.1–0.95)
MONOCYTES NFR BLD: 6 % (ref 2–12)
NEUTROPHILS NFR BLD: 68 % (ref 43–80)
NEUTS SEG NFR BLD: 2.52 K/UL (ref 1.8–7.3)
PHOSPHATE SERPL-MCNC: 4.6 MG/DL (ref 2.5–4.5)
PLATELET # BLD AUTO: 101 K/UL (ref 130–450)
PMV BLD AUTO: 10.3 FL (ref 7–12)
POTASSIUM SERPL-SCNC: 4.4 MMOL/L (ref 3.5–5)
PROT SERPL-MCNC: 6.6 G/DL (ref 6.4–8.3)
RBC # BLD AUTO: 2.8 M/UL (ref 3.5–5.5)
SODIUM SERPL-SCNC: 131 MMOL/L (ref 132–146)
WBC OTHER # BLD: 3.7 K/UL (ref 4.5–11.5)

## 2023-10-03 PROCEDURE — 36415 COLL VENOUS BLD VENIPUNCTURE: CPT

## 2023-10-03 PROCEDURE — 85025 COMPLETE CBC W/AUTO DIFF WBC: CPT

## 2023-10-03 PROCEDURE — 97530 THERAPEUTIC ACTIVITIES: CPT

## 2023-10-03 PROCEDURE — 6360000002 HC RX W HCPCS: Performed by: NURSE PRACTITIONER

## 2023-10-03 PROCEDURE — 1200000000 HC SEMI PRIVATE

## 2023-10-03 PROCEDURE — 2580000003 HC RX 258: Performed by: NURSE PRACTITIONER

## 2023-10-03 PROCEDURE — 97110 THERAPEUTIC EXERCISES: CPT

## 2023-10-03 PROCEDURE — 2700000000 HC OXYGEN THERAPY PER DAY

## 2023-10-03 PROCEDURE — 83735 ASSAY OF MAGNESIUM: CPT

## 2023-10-03 PROCEDURE — 94640 AIRWAY INHALATION TREATMENT: CPT

## 2023-10-03 PROCEDURE — 97165 OT EVAL LOW COMPLEX 30 MIN: CPT

## 2023-10-03 PROCEDURE — 6370000000 HC RX 637 (ALT 250 FOR IP): Performed by: NURSE PRACTITIONER

## 2023-10-03 PROCEDURE — 84100 ASSAY OF PHOSPHORUS: CPT

## 2023-10-03 PROCEDURE — 6370000000 HC RX 637 (ALT 250 FOR IP): Performed by: INTERNAL MEDICINE

## 2023-10-03 PROCEDURE — 82248 BILIRUBIN DIRECT: CPT

## 2023-10-03 PROCEDURE — 80053 COMPREHEN METABOLIC PANEL: CPT

## 2023-10-03 RX ORDER — PROCHLORPERAZINE EDISYLATE 5 MG/ML
10 INJECTION INTRAMUSCULAR; INTRAVENOUS EVERY 6 HOURS PRN
Status: DISCONTINUED | OUTPATIENT
Start: 2023-10-03 | End: 2023-10-04 | Stop reason: HOSPADM

## 2023-10-03 RX ADMIN — CITALOPRAM HYDROBROMIDE 40 MG: 20 TABLET ORAL at 11:55

## 2023-10-03 RX ADMIN — ASPIRIN 81 MG CHEWABLE TABLET 81 MG: 81 TABLET CHEWABLE at 08:33

## 2023-10-03 RX ADMIN — MIDODRINE HYDROCHLORIDE 5 MG: 5 TABLET ORAL at 08:34

## 2023-10-03 RX ADMIN — Medication 10 ML: at 21:06

## 2023-10-03 RX ADMIN — TACROLIMUS 3 MG: 1 CAPSULE ORAL at 20:54

## 2023-10-03 RX ADMIN — Medication 10 ML: at 08:40

## 2023-10-03 RX ADMIN — SENNOSIDES 17.2 MG: 8.6 TABLET, FILM COATED ORAL at 08:34

## 2023-10-03 RX ADMIN — HEPARIN SODIUM 5000 UNITS: 5000 INJECTION INTRAVENOUS; SUBCUTANEOUS at 07:06

## 2023-10-03 RX ADMIN — HEPARIN SODIUM 5000 UNITS: 5000 INJECTION INTRAVENOUS; SUBCUTANEOUS at 14:40

## 2023-10-03 RX ADMIN — Medication 1 TABLET: at 08:33

## 2023-10-03 RX ADMIN — ARFORMOTEROL TARTRATE 15 MCG: 15 SOLUTION RESPIRATORY (INHALATION) at 06:19

## 2023-10-03 RX ADMIN — BUDESONIDE INHALATION 500 MCG: 0.5 SUSPENSION RESPIRATORY (INHALATION) at 19:00

## 2023-10-03 RX ADMIN — ROPINIROLE HYDROCHLORIDE 1 MG: 1 TABLET, FILM COATED ORAL at 20:53

## 2023-10-03 RX ADMIN — MIDODRINE HYDROCHLORIDE 5 MG: 5 TABLET ORAL at 11:56

## 2023-10-03 RX ADMIN — TACROLIMUS 3 MG: 1 CAPSULE ORAL at 08:33

## 2023-10-03 RX ADMIN — MIDODRINE HYDROCHLORIDE 5 MG: 5 TABLET ORAL at 17:37

## 2023-10-03 RX ADMIN — ROPINIROLE HYDROCHLORIDE 1 MG: 1 TABLET, FILM COATED ORAL at 08:34

## 2023-10-03 RX ADMIN — PANTOPRAZOLE SODIUM 40 MG: 40 TABLET, DELAYED RELEASE ORAL at 11:56

## 2023-10-03 RX ADMIN — ARFORMOTEROL TARTRATE 15 MCG: 15 SOLUTION RESPIRATORY (INHALATION) at 19:00

## 2023-10-03 RX ADMIN — ENTECAVIR 0.5 MG: 1 TABLET ORAL at 08:33

## 2023-10-03 RX ADMIN — Medication 5 MG: at 20:53

## 2023-10-03 RX ADMIN — BUDESONIDE INHALATION 500 MCG: 0.5 SUSPENSION RESPIRATORY (INHALATION) at 06:19

## 2023-10-03 ASSESSMENT — PAIN SCALES - GENERAL: PAINLEVEL_OUTOF10: 0

## 2023-10-03 NOTE — CARE COORDINATION
Ss note: 10/3/2023.11:08 AM Pt has been accepted at [facility] and [address] has submitted for [facility], will await insurance approval. Pt attends [address] HD on MWF at 8 am. Will need PRECERT, Hens and signed SOUMYA for SNF. PTA pt has home 02. TYRESE Fall      Ss note: 10/3/2023 10:02 AM Notified by OT therapist pt receptive to SNF now, complaint of dizziness. Follow up visit to room to discuss. Pt is agreeable and sw reviewed SNF choices as pt attends [address] on MWF at 8 am and will need a SNF that can transport to HD. Pt reports a hx of [facility] and this is first choice, referral made, awaiting acceptance, requires PRECERT. Additional SNF choices are [facility] and then [facility]. Referral to [facility] at [facility] [place] too. At this time will await acceptance to [facility]. (PTA pt has home 02 at 3 liters via [employer]; [facility] has orders for PT only). Need PRECERT, Hens and signed SOUMYA for SNF. SW will follow.  TYRESE aFll

## 2023-10-03 NOTE — DISCHARGE INSTR - COC
(94.3 kg)     Mental Status:  oriented and alert    IV Access:  - Dialysis Catheter  - site  left, insertion date: fistula    Nursing Mobility/ADLs:  Walking   Assisted  Transfer  Assisted  Bathing  Assisted  Dressing  Assisted  Toileting  Assisted  Feeding  Independent  Med Admin  Assisted  Med Delivery   whole    Wound Care Documentation and Therapy:  Incision 02/04/21 Arm Left (Active)   Number of days: 971        Elimination:  Continence: Bowel: Yes  Bladder: Yes  Urinary Catheter: None   Colostomy/Ileostomy/Ileal Conduit: No       Date of Last BM: 10/3/23    Intake/Output Summary (Last 24 hours) at 10/3/2023 1112  Last data filed at 10/3/2023 0721  Gross per 24 hour   Intake 480 ml   Output 1500 ml   Net -1020 ml     I/O last 3 completed shifts: In: 350 [P.O.:50]  Out: 1500     Safety Concerns: At Risk for Falls    Impairments/Disabilities:      None    Nutrition Therapy:  Current Nutrition Therapy:   - Oral Diet:  Low Sodium (2gm)    Routes of Feeding: Oral  Liquids: Thin Liquids  Daily Fluid Restriction: yes - amount 1800  Last Modified Barium Swallow with Video (Video Swallowing Test): not done    Treatments at the Time of Hospital Discharge:   Respiratory Treatments:   Oxygen Therapy:  is on oxygen at 3 L/min per nasal cannula.   Ventilator:    - No ventilator support    Rehab Therapies: Physical Therapy and Occupational Therapy  Weight Bearing Status/Restrictions: No weight bearing restrictions  Other Medical Equipment (for information only, NOT a DME order):  walker  Other Treatments:     Patient's personal belongings (please select all that are sent with patient):  {[facility] DME Belongings:[id number]}    RN SIGNATURE:  Electronically signed by Bert Rubin RN on 10/4/23 at 1:31 PM EDT    CASE MANAGEMENT/SOCIAL WORK SECTION    Inpatient Status Date: ***    Readmission Risk Assessment Score:  Readmission Risk              Risk of Unplanned Readmission:  20           Discharging to Facility/ Agency

## 2023-10-03 NOTE — PLAN OF CARE
Problem: Safety - Adult  Goal: Free from fall injury  10/2/2023 2214 by Mansi Boles RN  Outcome: Progressing     Problem: Pain  Goal: Verbalizes/displays adequate comfort level or baseline comfort level  10/2/2023 2214 by Mansi Boles RN  Outcome: Progressing

## 2023-10-04 VITALS
TEMPERATURE: 98 F | OXYGEN SATURATION: 100 % | DIASTOLIC BLOOD PRESSURE: 60 MMHG | RESPIRATION RATE: 16 BRPM | HEART RATE: 58 BPM | SYSTOLIC BLOOD PRESSURE: 138 MMHG | HEIGHT: 63 IN | BODY MASS INDEX: 36.84 KG/M2 | WEIGHT: 207.89 LBS

## 2023-10-04 LAB
ALBUMIN SERPL-MCNC: 3.6 G/DL (ref 3.5–5.2)
ALP SERPL-CCNC: 49 U/L (ref 35–104)
ALT SERPL-CCNC: 14 U/L (ref 0–32)
ANION GAP SERPL CALCULATED.3IONS-SCNC: 13 MMOL/L (ref 7–16)
AST SERPL-CCNC: 24 U/L (ref 0–31)
BASOPHILS # BLD: 0.02 K/UL (ref 0–0.2)
BASOPHILS NFR BLD: 0 % (ref 0–2)
BILIRUB DIRECT SERPL-MCNC: <0.2 MG/DL (ref 0–0.3)
BILIRUB INDIRECT SERPL-MCNC: NORMAL MG/DL (ref 0–1)
BILIRUB SERPL-MCNC: 0.4 MG/DL (ref 0–1.2)
BUN SERPL-MCNC: 36 MG/DL (ref 6–23)
CALCIUM SERPL-MCNC: 9 MG/DL (ref 8.6–10.2)
CHLORIDE SERPL-SCNC: 92 MMOL/L (ref 98–107)
CO2 SERPL-SCNC: 27 MMOL/L (ref 22–29)
CREAT SERPL-MCNC: 7.4 MG/DL (ref 0.5–1)
EOSINOPHIL # BLD: 0.09 K/UL (ref 0.05–0.5)
EOSINOPHILS RELATIVE PERCENT: 2 % (ref 0–6)
ERYTHROCYTE [DISTWIDTH] IN BLOOD BY AUTOMATED COUNT: 14.8 % (ref 11.5–15)
GFR SERPL CREATININE-BSD FRML MDRD: 6 ML/MIN/1.73M2
GLUCOSE SERPL-MCNC: 82 MG/DL (ref 74–99)
HCT VFR BLD AUTO: 28.4 % (ref 34–48)
HGB BLD-MCNC: 9.4 G/DL (ref 11.5–15.5)
LYMPHOCYTES NFR BLD: 1.08 K/UL (ref 1.5–4)
LYMPHOCYTES RELATIVE PERCENT: 24 % (ref 20–42)
MAGNESIUM SERPL-MCNC: 2.2 MG/DL (ref 1.6–2.6)
MCH RBC QN AUTO: 34.2 PG (ref 26–35)
MCHC RBC AUTO-ENTMCNC: 33.1 G/DL (ref 32–34.5)
MCV RBC AUTO: 103.3 FL (ref 80–99.9)
MONOCYTES NFR BLD: 0.28 K/UL (ref 0.1–0.95)
MONOCYTES NFR BLD: 6 % (ref 2–12)
NEUTROPHILS NFR BLD: 67 % (ref 43–80)
NEUTS SEG NFR BLD: 3.06 K/UL (ref 1.8–7.3)
PHOSPHATE SERPL-MCNC: 4.9 MG/DL (ref 2.5–4.5)
PLATELET # BLD AUTO: 97 K/UL (ref 130–450)
PLATELET CONFIRMATION: NORMAL
PMV BLD AUTO: 10.1 FL (ref 7–12)
POTASSIUM SERPL-SCNC: 4.6 MMOL/L (ref 3.5–5)
PROT SERPL-MCNC: 6.8 G/DL (ref 6.4–8.3)
RBC # BLD AUTO: 2.75 M/UL (ref 3.5–5.5)
RBC # BLD: ABNORMAL 10*6/UL
SODIUM SERPL-SCNC: 132 MMOL/L (ref 132–146)
WBC OTHER # BLD: 4.6 K/UL (ref 4.5–11.5)

## 2023-10-04 PROCEDURE — 94640 AIRWAY INHALATION TREATMENT: CPT

## 2023-10-04 PROCEDURE — 6370000000 HC RX 637 (ALT 250 FOR IP): Performed by: NURSE PRACTITIONER

## 2023-10-04 PROCEDURE — 82248 BILIRUBIN DIRECT: CPT

## 2023-10-04 PROCEDURE — 2700000000 HC OXYGEN THERAPY PER DAY

## 2023-10-04 PROCEDURE — 90935 HEMODIALYSIS ONE EVALUATION: CPT

## 2023-10-04 PROCEDURE — 84100 ASSAY OF PHOSPHORUS: CPT

## 2023-10-04 PROCEDURE — 6360000002 HC RX W HCPCS: Performed by: NURSE PRACTITIONER

## 2023-10-04 PROCEDURE — 80053 COMPREHEN METABOLIC PANEL: CPT

## 2023-10-04 PROCEDURE — 85025 COMPLETE CBC W/AUTO DIFF WBC: CPT

## 2023-10-04 PROCEDURE — 80197 ASSAY OF TACROLIMUS: CPT

## 2023-10-04 PROCEDURE — 6370000000 HC RX 637 (ALT 250 FOR IP): Performed by: INTERNAL MEDICINE

## 2023-10-04 PROCEDURE — 36415 COLL VENOUS BLD VENIPUNCTURE: CPT

## 2023-10-04 PROCEDURE — 83735 ASSAY OF MAGNESIUM: CPT

## 2023-10-04 RX ORDER — ONDANSETRON 4 MG/1
4 TABLET, ORALLY DISINTEGRATING ORAL EVERY 8 HOURS PRN
DISCHARGE
Start: 2023-10-04

## 2023-10-04 RX ORDER — MIDODRINE HYDROCHLORIDE 5 MG/1
5 TABLET ORAL
Qty: 90 TABLET | Refills: 3 | DISCHARGE
Start: 2023-10-04

## 2023-10-04 RX ORDER — MECOBALAMIN 5000 MCG
5 TABLET,DISINTEGRATING ORAL NIGHTLY
DISCHARGE
Start: 2023-10-04

## 2023-10-04 RX ADMIN — CITALOPRAM HYDROBROMIDE 40 MG: 20 TABLET ORAL at 13:54

## 2023-10-04 RX ADMIN — BUDESONIDE INHALATION 500 MCG: 0.5 SUSPENSION RESPIRATORY (INHALATION) at 04:44

## 2023-10-04 RX ADMIN — ROPINIROLE HYDROCHLORIDE 1 MG: 1 TABLET, FILM COATED ORAL at 07:48

## 2023-10-04 RX ADMIN — PANTOPRAZOLE SODIUM 40 MG: 40 TABLET, DELAYED RELEASE ORAL at 13:55

## 2023-10-04 RX ADMIN — Medication 1 TABLET: at 13:55

## 2023-10-04 RX ADMIN — MIDODRINE HYDROCHLORIDE 5 MG: 5 TABLET ORAL at 07:50

## 2023-10-04 RX ADMIN — TACROLIMUS 3 MG: 1 CAPSULE ORAL at 13:54

## 2023-10-04 RX ADMIN — MIDODRINE HYDROCHLORIDE 5 MG: 5 TABLET ORAL at 13:54

## 2023-10-04 RX ADMIN — ARFORMOTEROL TARTRATE 15 MCG: 15 SOLUTION RESPIRATORY (INHALATION) at 04:44

## 2023-10-04 NOTE — CARE COORDINATION
Ss note: 10/4/2023 9:40 AM Pt currently in HD treatment. Per constance [facility] has been obtained. Pt attend [facility] at 8 am, will need Hens and signed SOUMYA. PTA pt uses home 02 via [employer] at 3 liters. Will await discharge order to arrange transfer and notify family and [facility].  TYRESE Aguilar

## 2023-10-04 NOTE — DISCHARGE SUMMARY
Department of Internal Medicine      Primary Care Physician: Ghazal Watkins DO   Admitting Physician:  Ada Faustin DO  Admission date and time: 9/29/2023  3:37 PM    Room:  [address] [place] [address]  Admitting diagnosis: Orthostatic hypotension [I95.1]    Patient Name: Toney Davis  MRN: [id number]    Date of Service: 10/4/2023     Chief Complaint:  Dizziness    HISTORY OF PRESENT ILLNESS:    Toney Davis is a 21-year-old female patient who presented to [facility] for evaluation of dizziness. She has been having issues with dizziness with hemodialysis. She has been losing weight due to ongoing issues with vomiting and diarrhea that are known. Her dry weight is being adjusted. They have been giving the patient midodrine with dialysis as well. Following 2.5 L of removal today the patient states that she was given 1-1/2 L back but remained dizzy. She was confirmed as being orthostatic in the emergency department and was given an additional 750 mL bolus. EKG was interpreted as nonischemic. Chest x-ray was unremarkable. CT of the abdomen pelvis showed no acute process as well and no explanatory findings of the GI symptoms as discussed above. Rapid COVID test returned negative. CBC revealed a macrocytic anemia which is mild. Lactic acid was not elevated. High-sensitivity troponin x1 was assessed and was elevated but was not repeated. examined lipase was not elevated. BMP revealed baseline chronic renal insufficiency at end-stage with no acute abnormalities otherwise. Hepatic function panel was unremarkable. Case was discussed with ER physician. I requested that they reach out to the nephrology team further recommendations regarding fluid resuscitation given the patient's end-stage renal disease on hemodialysis unable to do so. I have excepted the patient for admission on behalf of Dr. Selam Soares. 9/30/2023  Patient seen and examined on telemetry floor.   Patient still complains of some weakness and

## 2023-10-04 NOTE — CARE COORDINATION
Discharge orders noted. Follow-up in 2 weeks after discharge -patient to call for appointment and with questions/concerns at [phone]. Thank you for the opportunity to participate in the care of of Mrs. Juju Braxton MD

## 2023-10-04 NOTE — CARE COORDINATION
Ss note: 10/4/2023  11:01 AM Discharge order noted. Hens completed. Arranged [facility] transfer to [facility] today at 2:00 pm. Facility liaison, nursing aware. Pt is currently in HD treatment.  Attends [facility] at 8 am. TYRESE Guillen

## 2023-10-06 LAB — TACROLIMUS BLD-MCNC: 6.2 NG/ML

## 2023-10-24 ENCOUNTER — APPOINTMENT (OUTPATIENT)
Dept: GENERAL RADIOLOGY | Age: 63
DRG: 312 | End: 2023-10-24
Payer: MEDICARE

## 2023-10-24 ENCOUNTER — HOSPITAL ENCOUNTER (INPATIENT)
Age: 63
LOS: 6 days | Discharge: HOME | DRG: 312 | End: 2023-10-30
Attending: STUDENT IN AN ORGANIZED HEALTH CARE EDUCATION/TRAINING PROGRAM | Admitting: INTERNAL MEDICINE
Payer: MEDICARE

## 2023-10-24 ENCOUNTER — APPOINTMENT (OUTPATIENT)
Dept: CT IMAGING | Age: 63
DRG: 312 | End: 2023-10-24
Payer: MEDICARE

## 2023-10-24 DIAGNOSIS — E87.5 HYPERKALEMIA: Primary | ICD-10-CM

## 2023-10-24 DIAGNOSIS — R55 NEAR SYNCOPE: ICD-10-CM

## 2023-10-24 DIAGNOSIS — R11.2 NAUSEA AND VOMITING, UNSPECIFIED VOMITING TYPE: ICD-10-CM

## 2023-10-24 DIAGNOSIS — Z99.2 STAGE 5 CHRONIC KIDNEY DISEASE ON CHRONIC DIALYSIS (HCC): ICD-10-CM

## 2023-10-24 DIAGNOSIS — N18.6 STAGE 5 CHRONIC KIDNEY DISEASE ON CHRONIC DIALYSIS (HCC): ICD-10-CM

## 2023-10-24 LAB
ALBUMIN SERPL-MCNC: 3.6 G/DL (ref 3.5–5.2)
ALP SERPL-CCNC: 46 U/L (ref 35–104)
ALT SERPL-CCNC: 14 U/L (ref 0–32)
ANION GAP SERPL CALCULATED.3IONS-SCNC: 11 MMOL/L (ref 7–16)
AST SERPL-CCNC: 22 U/L (ref 0–31)
B.E.: 3.1 MMOL/L (ref -3–3)
BASOPHILS # BLD: 0 K/UL (ref 0–0.2)
BASOPHILS NFR BLD: 0 % (ref 0–2)
BILIRUB SERPL-MCNC: 0.7 MG/DL (ref 0–1.2)
BUN SERPL-MCNC: 40 MG/DL (ref 6–23)
CALCIUM SERPL-MCNC: 9 MG/DL (ref 8.6–10.2)
CHLORIDE SERPL-SCNC: 93 MMOL/L (ref 98–107)
CO2 SERPL-SCNC: 29 MMOL/L (ref 22–29)
COHB: 1.1 % (ref 0–1.5)
COMMENT: ABNORMAL
CREAT SERPL-MCNC: 6.3 MG/DL (ref 0.5–1)
CRITICAL: ABNORMAL
DATE ANALYZED: ABNORMAL
DATE OF COLLECTION: ABNORMAL
EOSINOPHIL # BLD: 0.03 K/UL (ref 0.05–0.5)
EOSINOPHILS RELATIVE PERCENT: 1 % (ref 0–6)
ERYTHROCYTE [DISTWIDTH] IN BLOOD BY AUTOMATED COUNT: 16.2 % (ref 11.5–15)
GFR SERPL CREATININE-BSD FRML MDRD: 7 ML/MIN/1.73M2
GLUCOSE BLD-MCNC: 101 MG/DL (ref 74–99)
GLUCOSE SERPL-MCNC: 82 MG/DL (ref 74–99)
HCO3: 27.4 MMOL/L (ref 22–26)
HCT VFR BLD AUTO: 26.7 % (ref 34–48)
HGB BLD-MCNC: 9.1 G/DL (ref 11.5–15.5)
HHB: 32.6 % (ref 0–5)
LAB: ABNORMAL
LACTATE BLDV-SCNC: 0.8 MMOL/L (ref 0.5–2.2)
LIPASE SERPL-CCNC: 17 U/L (ref 13–60)
LYMPHOCYTES NFR BLD: 0.73 K/UL (ref 1.5–4)
LYMPHOCYTES RELATIVE PERCENT: 24 % (ref 20–42)
Lab: 2003
MCH RBC QN AUTO: 35.8 PG (ref 26–35)
MCHC RBC AUTO-ENTMCNC: 34.1 G/DL (ref 32–34.5)
MCV RBC AUTO: 105.1 FL (ref 80–99.9)
METHB: 0.3 % (ref 0–1.5)
MODE: ABNORMAL
MONOCYTES NFR BLD: 0.03 K/UL (ref 0.1–0.95)
MONOCYTES NFR BLD: 1 % (ref 2–12)
NEUTROPHILS NFR BLD: 75 % (ref 43–80)
NEUTS SEG NFR BLD: 2.31 K/UL (ref 1.8–7.3)
O2 CONTENT: 9.1 ML/DL
O2 SATURATION: 66.9 % (ref 92–98.5)
O2HB: 66 % (ref 94–97)
OPERATOR ID: 30
PATIENT TEMP: 37 C
PCO2: 40.6 MMHG (ref 35–45)
PH BLOOD GAS: 7.45 (ref 7.35–7.45)
PLATELET # BLD AUTO: 84 K/UL (ref 130–450)
PLATELET CONFIRMATION: NORMAL
PMV BLD AUTO: 10.5 FL (ref 7–12)
PO2: 35 MMHG (ref 75–100)
POTASSIUM SERPL-SCNC: 5.8 MMOL/L (ref 3.5–5)
PROT SERPL-MCNC: 6.5 G/DL (ref 6.4–8.3)
RBC # BLD AUTO: 2.54 M/UL (ref 3.5–5.5)
RBC # BLD: ABNORMAL 10*6/UL
RBC # BLD: ABNORMAL 10*6/UL
SODIUM SERPL-SCNC: 133 MMOL/L (ref 132–146)
SOURCE, BLOOD GAS: ABNORMAL
THB: 9.8 G/DL (ref 11.5–16.5)
TIME ANALYZED: 2010
TROPONIN I SERPL HS-MCNC: 41 NG/L (ref 0–9)
WBC OTHER # BLD: 3.1 K/UL (ref 4.5–11.5)

## 2023-10-24 PROCEDURE — 6360000002 HC RX W HCPCS: Performed by: EMERGENCY MEDICINE

## 2023-10-24 PROCEDURE — 82962 GLUCOSE BLOOD TEST: CPT

## 2023-10-24 PROCEDURE — 70450 CT HEAD/BRAIN W/O DYE: CPT

## 2023-10-24 PROCEDURE — 80053 COMPREHEN METABOLIC PANEL: CPT

## 2023-10-24 PROCEDURE — 1200000000 HC SEMI PRIVATE

## 2023-10-24 PROCEDURE — 6370000000 HC RX 637 (ALT 250 FOR IP): Performed by: EMERGENCY MEDICINE

## 2023-10-24 PROCEDURE — 96375 TX/PRO/DX INJ NEW DRUG ADDON: CPT

## 2023-10-24 PROCEDURE — 99285 EMERGENCY DEPT VISIT HI MDM: CPT

## 2023-10-24 PROCEDURE — 82805 BLOOD GASES W/O2 SATURATION: CPT

## 2023-10-24 PROCEDURE — 96365 THER/PROPH/DIAG IV INF INIT: CPT

## 2023-10-24 PROCEDURE — 85025 COMPLETE CBC W/AUTO DIFF WBC: CPT

## 2023-10-24 PROCEDURE — 83690 ASSAY OF LIPASE: CPT

## 2023-10-24 PROCEDURE — 2700000000 HC OXYGEN THERAPY PER DAY

## 2023-10-24 PROCEDURE — 93005 ELECTROCARDIOGRAM TRACING: CPT | Performed by: EMERGENCY MEDICINE

## 2023-10-24 PROCEDURE — 84484 ASSAY OF TROPONIN QUANT: CPT

## 2023-10-24 PROCEDURE — 71045 X-RAY EXAM CHEST 1 VIEW: CPT

## 2023-10-24 PROCEDURE — 2580000003 HC RX 258: Performed by: EMERGENCY MEDICINE

## 2023-10-24 PROCEDURE — 74177 CT ABD & PELVIS W/CONTRAST: CPT

## 2023-10-24 PROCEDURE — 83605 ASSAY OF LACTIC ACID: CPT

## 2023-10-24 PROCEDURE — 2580000003 HC RX 258

## 2023-10-24 PROCEDURE — 6360000004 HC RX CONTRAST MEDICATION: Performed by: RADIOLOGY

## 2023-10-24 RX ORDER — ENOXAPARIN SODIUM 100 MG/ML
40 INJECTION SUBCUTANEOUS DAILY
Status: DISCONTINUED | OUTPATIENT
Start: 2023-10-25 | End: 2023-10-24 | Stop reason: CLARIF

## 2023-10-24 RX ORDER — MECOBALAMIN 5000 MCG
5 TABLET,DISINTEGRATING ORAL NIGHTLY
Status: DISCONTINUED | OUTPATIENT
Start: 2023-10-25 | End: 2023-10-30 | Stop reason: HOSPADM

## 2023-10-24 RX ORDER — DEXTROSE MONOHYDRATE 25 G/50ML
25 INJECTION, SOLUTION INTRAVENOUS ONCE
Status: DISCONTINUED | OUTPATIENT
Start: 2023-10-24 | End: 2023-10-24

## 2023-10-24 RX ORDER — PANTOPRAZOLE SODIUM 40 MG/1
40 TABLET, DELAYED RELEASE ORAL
Status: DISCONTINUED | OUTPATIENT
Start: 2023-10-25 | End: 2023-10-30 | Stop reason: HOSPADM

## 2023-10-24 RX ORDER — SODIUM CHLORIDE 0.9 % (FLUSH) 0.9 %
5-40 SYRINGE (ML) INJECTION EVERY 12 HOURS SCHEDULED
Status: DISCONTINUED | OUTPATIENT
Start: 2023-10-24 | End: 2023-10-30 | Stop reason: HOSPADM

## 2023-10-24 RX ORDER — BUDESONIDE 0.5 MG/2ML
0.5 INHALANT ORAL
Status: DISCONTINUED | OUTPATIENT
Start: 2023-10-25 | End: 2023-10-30 | Stop reason: HOSPADM

## 2023-10-24 RX ORDER — 0.9 % SODIUM CHLORIDE 0.9 %
500 INTRAVENOUS SOLUTION INTRAVENOUS ONCE
Status: DISCONTINUED | OUTPATIENT
Start: 2023-10-25 | End: 2023-10-30 | Stop reason: HOSPADM

## 2023-10-24 RX ORDER — MIDODRINE HYDROCHLORIDE 5 MG/1
5 TABLET ORAL
Status: DISCONTINUED | OUTPATIENT
Start: 2023-10-25 | End: 2023-10-26

## 2023-10-24 RX ORDER — GLUCAGON 1 MG/ML
1 KIT INJECTION PRN
Status: DISCONTINUED | OUTPATIENT
Start: 2023-10-24 | End: 2023-10-30 | Stop reason: HOSPADM

## 2023-10-24 RX ORDER — PRIMIDONE 50 MG/1
50 TABLET ORAL DAILY
Status: DISCONTINUED | OUTPATIENT
Start: 2023-10-25 | End: 2023-10-30 | Stop reason: HOSPADM

## 2023-10-24 RX ORDER — POLYETHYLENE GLYCOL 3350 17 G/17G
17 POWDER, FOR SOLUTION ORAL DAILY PRN
Status: DISCONTINUED | OUTPATIENT
Start: 2023-10-24 | End: 2023-10-30 | Stop reason: HOSPADM

## 2023-10-24 RX ORDER — SODIUM CHLORIDE 9 MG/ML
INJECTION, SOLUTION INTRAVENOUS PRN
Status: DISCONTINUED | OUTPATIENT
Start: 2023-10-24 | End: 2023-10-30 | Stop reason: HOSPADM

## 2023-10-24 RX ORDER — CITALOPRAM HYDROBROMIDE 10 MG/1
40 TABLET ORAL
Status: DISCONTINUED | OUTPATIENT
Start: 2023-10-25 | End: 2023-10-30 | Stop reason: HOSPADM

## 2023-10-24 RX ORDER — ARFORMOTEROL TARTRATE 15 UG/2ML
15 SOLUTION RESPIRATORY (INHALATION)
Status: DISCONTINUED | OUTPATIENT
Start: 2023-10-25 | End: 2023-10-30 | Stop reason: HOSPADM

## 2023-10-24 RX ORDER — ASPIRIN 81 MG/1
81 TABLET, CHEWABLE ORAL DAILY
Status: DISCONTINUED | OUTPATIENT
Start: 2023-10-25 | End: 2023-10-30 | Stop reason: HOSPADM

## 2023-10-24 RX ORDER — 0.9 % SODIUM CHLORIDE 0.9 %
1000 INTRAVENOUS SOLUTION INTRAVENOUS ONCE
Status: COMPLETED | OUTPATIENT
Start: 2023-10-24 | End: 2023-10-24

## 2023-10-24 RX ORDER — SENNOSIDES A AND B 8.6 MG/1
2 TABLET, FILM COATED ORAL DAILY
Status: DISCONTINUED | OUTPATIENT
Start: 2023-10-25 | End: 2023-10-30 | Stop reason: HOSPADM

## 2023-10-24 RX ORDER — BUDESONIDE AND FORMOTEROL FUMARATE DIHYDRATE 160; 4.5 UG/1; UG/1
2 AEROSOL RESPIRATORY (INHALATION)
Status: DISCONTINUED | OUTPATIENT
Start: 2023-10-25 | End: 2023-10-24 | Stop reason: CLARIF

## 2023-10-24 RX ORDER — TACROLIMUS 1 MG/1
3 CAPSULE ORAL 2 TIMES DAILY
Status: DISCONTINUED | OUTPATIENT
Start: 2023-10-25 | End: 2023-10-30 | Stop reason: HOSPADM

## 2023-10-24 RX ORDER — DEXTROSE MONOHYDRATE 100 MG/ML
INJECTION, SOLUTION INTRAVENOUS CONTINUOUS PRN
Status: DISCONTINUED | OUTPATIENT
Start: 2023-10-24 | End: 2023-10-30 | Stop reason: HOSPADM

## 2023-10-24 RX ORDER — ACETAMINOPHEN 650 MG/1
650 SUPPOSITORY RECTAL EVERY 6 HOURS PRN
Status: DISCONTINUED | OUTPATIENT
Start: 2023-10-24 | End: 2023-10-30 | Stop reason: HOSPADM

## 2023-10-24 RX ORDER — ACETAMINOPHEN 325 MG/1
650 TABLET ORAL EVERY 6 HOURS PRN
Status: DISCONTINUED | OUTPATIENT
Start: 2023-10-24 | End: 2023-10-30 | Stop reason: HOSPADM

## 2023-10-24 RX ORDER — ROPINIROLE 1 MG/1
1 TABLET, FILM COATED ORAL 2 TIMES DAILY
Status: DISCONTINUED | OUTPATIENT
Start: 2023-10-25 | End: 2023-10-30 | Stop reason: HOSPADM

## 2023-10-24 RX ORDER — SODIUM CHLORIDE 0.9 % (FLUSH) 0.9 %
5-40 SYRINGE (ML) INJECTION PRN
Status: DISCONTINUED | OUTPATIENT
Start: 2023-10-24 | End: 2023-10-30 | Stop reason: HOSPADM

## 2023-10-24 RX ORDER — ONDANSETRON 2 MG/ML
4 INJECTION INTRAMUSCULAR; INTRAVENOUS ONCE
Status: COMPLETED | OUTPATIENT
Start: 2023-10-24 | End: 2023-10-24

## 2023-10-24 RX ORDER — HEPARIN SODIUM 5000 [USP'U]/ML
5000 INJECTION, SOLUTION INTRAVENOUS; SUBCUTANEOUS EVERY 8 HOURS SCHEDULED
Status: DISCONTINUED | OUTPATIENT
Start: 2023-10-25 | End: 2023-10-30 | Stop reason: HOSPADM

## 2023-10-24 RX ADMIN — ONDANSETRON 4 MG: 2 INJECTION INTRAMUSCULAR; INTRAVENOUS at 18:27

## 2023-10-24 RX ADMIN — DEXTROSE MONOHYDRATE 250 ML: 100 INJECTION, SOLUTION INTRAVENOUS at 21:48

## 2023-10-24 RX ADMIN — SODIUM CHLORIDE 1000 ML: 9 INJECTION, SOLUTION INTRAVENOUS at 18:27

## 2023-10-24 RX ADMIN — INSULIN HUMAN 8 UNITS: 100 INJECTION, SOLUTION PARENTERAL at 21:43

## 2023-10-24 RX ADMIN — IOPAMIDOL 75 ML: 755 INJECTION, SOLUTION INTRAVENOUS at 20:25

## 2023-10-24 RX ADMIN — SODIUM ZIRCONIUM CYCLOSILICATE 10 G: 10 POWDER, FOR SUSPENSION ORAL at 21:44

## 2023-10-24 NOTE — ED PROVIDER NOTES
[address] [facility]        Pt Name: Kaylin Ferrell  MRN: [id number]  [address] [facility] [place] 1960  Date of evaluation: 10/24/2023  Provider: Shiv Fried DO  PCP: Suzi Pérez DO  Note Started: 5:12 PM EDT 10/24/23    CHIEF COMPLAINT       Chief Complaint   Patient presents with    Loss of Consciousness     Pt had a near syncopal episode at [place] office. Pt reports nausea. Pt is a dialysis patient. HISTORY OF PRESENT ILLNESS: 1 or more Elements   History From: patient    Limitations to history : None    Kaylin Ferrell is a 63 y.o. female who presents presents to the ED for evaluation after having a near syncopal episode at her PCPs office today. Patient states that she went to see her PCP and they told her that her blood pressure was really high. She states she had taken her midodrine prior to going to the office. She also states that during that episode she got very nauseated. They said she looked gray when she almost passed out. Denied any chest pain or shortness of breath. Blood pressure is improved and she states he is feeling a lot better but still is nauseated. Patient had been admitted beginning of the month for episodes of dizziness, nausea, and vomiting. She was found to be orthostatic in the ED and was admitted after IV fluid bolus as she was still symptomatic. Also reports some dyspnea. Has a longstanding history of COPD. Former smoker. Nursing Notes were all reviewed and agreed with or any disagreements were addressed in the HPI. REVIEW OF EXTERNAL NOTE :       Chart review shows that patient had an echo on 6/5/2023. EF was approximately to be less than 50%. Normal right ventricular function    REVIEW OF SYSTEMS :           Positives and Pertinent negatives as per HPI.      SURGICAL HISTORY     Past Surgical History:   Procedure Laterality Date    CARDIAC CATHETERIZATION  06/06/2023    [facility]    CHOLECYSTECTOMY      EYE SURGERY Infection is not suspected        Medical Decision Making/Differential Diagnosis:    CC/HPI Summary, Social Determinants of health, Records Reviewed, DDx, testing done/not done, ED Course, Reassessment, disposition considerations/shared decision making with patient, consults, disposition:      ED Course as of 10/25/23 2015   Tue Oct 24, 2023   2037 Spoke with Dr. Ganga Garay (nephrology). Discussed case. He will provide consultation. He agrees with the insulin and dextrose as well as the LIZ SANCHEZ [facility] therapy. He recommends repeating the potassium in a couple hours and repeating therapy if necessary. He is agreeable to observation overnight to reassess the potassium. [MS]   2041 Patient is feeling better after Zofran. No nausea. She is complaining of a mild headache. Tylenol ordered and will reassess. [MS]   2053 Spoke with Dr. Derik Barrios (Medicine). Discussed case. He will admit this patient   [MS]      ED Course User Index  [MS] Tigreradha Decker, DO        Medical Decision Making  Amount and/or Complexity of Data Reviewed  Labs: ordered. Radiology: ordered. ECG/medicine tests: ordered. Risk  OTC drugs. Prescription drug management. Decision regarding hospitalization. Patient presents to the ED for evaluation after having a near syncopal episode at her PCPs office today. Patient states that she went to see her PCP and they told her that her blood pressure was really high. She states she had taken her midodrine prior to going to the office. She also states that during that episode she got very nauseated. They said she looked gray when she almost passed out. Denied any chest pain or shortness of breath. Blood pressure is improved and she states he is feeling a lot better but still is nauseated. On exam patient appears on be uncomfortable but is in no distress. Lungs are diminished throughout. No rales or rhonchi. No accessory muscle use or conversational dyspnea abdomen soft nontender.   No

## 2023-10-25 ENCOUNTER — APPOINTMENT (OUTPATIENT)
Dept: CT IMAGING | Age: 63
DRG: 312 | End: 2023-10-25
Payer: MEDICARE

## 2023-10-25 LAB
ALBUMIN SERPL-MCNC: 3.4 G/DL (ref 3.5–5.2)
ALP SERPL-CCNC: 44 U/L (ref 35–104)
ALT SERPL-CCNC: 13 U/L (ref 0–32)
ANION GAP SERPL CALCULATED.3IONS-SCNC: 9 MMOL/L (ref 7–16)
AST SERPL-CCNC: 20 U/L (ref 0–31)
BASOPHILS # BLD: 0 K/UL (ref 0–0.2)
BASOPHILS NFR BLD: 0 % (ref 0–2)
BILIRUB SERPL-MCNC: 0.6 MG/DL (ref 0–1.2)
BUN SERPL-MCNC: 42 MG/DL (ref 6–23)
CALCIUM SERPL-MCNC: 8.5 MG/DL (ref 8.6–10.2)
CHLORIDE SERPL-SCNC: 93 MMOL/L (ref 98–107)
CO2 SERPL-SCNC: 30 MMOL/L (ref 22–29)
CREAT SERPL-MCNC: 6.7 MG/DL (ref 0.5–1)
EKG ATRIAL RATE: 61 BPM
EKG P AXIS: 33 DEGREES
EKG P-R INTERVAL: 242 MS
EKG Q-T INTERVAL: 448 MS
EKG QRS DURATION: 82 MS
EKG QTC CALCULATION (BAZETT): 450 MS
EKG R AXIS: 32 DEGREES
EKG T AXIS: 8 DEGREES
EKG VENTRICULAR RATE: 61 BPM
EOSINOPHIL # BLD: 0.06 K/UL (ref 0.05–0.5)
EOSINOPHILS RELATIVE PERCENT: 3 % (ref 0–6)
ERYTHROCYTE [DISTWIDTH] IN BLOOD BY AUTOMATED COUNT: 16.6 % (ref 11.5–15)
GFR SERPL CREATININE-BSD FRML MDRD: 7 ML/MIN/1.73M2
GLUCOSE BLD-MCNC: 133 MG/DL (ref 74–99)
GLUCOSE SERPL-MCNC: 101 MG/DL (ref 74–99)
HCT VFR BLD AUTO: 26.3 % (ref 34–48)
HGB BLD-MCNC: 8.6 G/DL (ref 11.5–15.5)
LYMPHOCYTES NFR BLD: 0.54 K/UL (ref 1.5–4)
LYMPHOCYTES RELATIVE PERCENT: 24 % (ref 20–42)
MAGNESIUM SERPL-MCNC: 2 MG/DL (ref 1.6–2.6)
MCH RBC QN AUTO: 35.2 PG (ref 26–35)
MCHC RBC AUTO-ENTMCNC: 32.7 G/DL (ref 32–34.5)
MCV RBC AUTO: 107.8 FL (ref 80–99.9)
MONOCYTES NFR BLD: 0.06 K/UL (ref 0.1–0.95)
MONOCYTES NFR BLD: 3 % (ref 2–12)
NEUTROPHILS NFR BLD: 71 % (ref 43–80)
NEUTS SEG NFR BLD: 1.63 K/UL (ref 1.8–7.3)
PHOSPHATE SERPL-MCNC: 6.5 MG/DL (ref 2.5–4.5)
PLATELET # BLD AUTO: 72 K/UL (ref 130–450)
PLATELET CONFIRMATION: NORMAL
PMV BLD AUTO: 10.3 FL (ref 7–12)
POTASSIUM SERPL-SCNC: 5.9 MMOL/L (ref 3.5–5)
PROCALCITONIN SERPL-MCNC: 0.35 NG/ML (ref 0–0.08)
PROT SERPL-MCNC: 6 G/DL (ref 6.4–8.3)
RBC # BLD AUTO: 2.44 M/UL (ref 3.5–5.5)
RBC # BLD: ABNORMAL 10*6/UL
SODIUM SERPL-SCNC: 132 MMOL/L (ref 132–146)
T4 FREE SERPL-MCNC: 1.2 NG/DL (ref 0.9–1.7)
TSH SERPL DL<=0.05 MIU/L-ACNC: 1.39 UIU/ML (ref 0.27–4.2)
WBC OTHER # BLD: 2.3 K/UL (ref 4.5–11.5)

## 2023-10-25 PROCEDURE — 6370000000 HC RX 637 (ALT 250 FOR IP): Performed by: INTERNAL MEDICINE

## 2023-10-25 PROCEDURE — 71250 CT THORAX DX C-: CPT

## 2023-10-25 PROCEDURE — 84443 ASSAY THYROID STIM HORMONE: CPT

## 2023-10-25 PROCEDURE — 6360000002 HC RX W HCPCS: Performed by: INTERNAL MEDICINE

## 2023-10-25 PROCEDURE — 84145 PROCALCITONIN (PCT): CPT

## 2023-10-25 PROCEDURE — 83735 ASSAY OF MAGNESIUM: CPT

## 2023-10-25 PROCEDURE — 2580000003 HC RX 258: Performed by: INTERNAL MEDICINE

## 2023-10-25 PROCEDURE — 1200000000 HC SEMI PRIVATE

## 2023-10-25 PROCEDURE — 90935 HEMODIALYSIS ONE EVALUATION: CPT

## 2023-10-25 PROCEDURE — 2700000000 HC OXYGEN THERAPY PER DAY

## 2023-10-25 PROCEDURE — 85025 COMPLETE CBC W/AUTO DIFF WBC: CPT

## 2023-10-25 PROCEDURE — 36415 COLL VENOUS BLD VENIPUNCTURE: CPT

## 2023-10-25 PROCEDURE — 80053 COMPREHEN METABOLIC PANEL: CPT

## 2023-10-25 PROCEDURE — 94640 AIRWAY INHALATION TREATMENT: CPT

## 2023-10-25 PROCEDURE — 84439 ASSAY OF FREE THYROXINE: CPT

## 2023-10-25 PROCEDURE — 82962 GLUCOSE BLOOD TEST: CPT

## 2023-10-25 PROCEDURE — 93010 ELECTROCARDIOGRAM REPORT: CPT | Performed by: INTERNAL MEDICINE

## 2023-10-25 PROCEDURE — 84100 ASSAY OF PHOSPHORUS: CPT

## 2023-10-25 RX ORDER — IPRATROPIUM BROMIDE AND ALBUTEROL SULFATE 2.5; .5 MG/3ML; MG/3ML
1 SOLUTION RESPIRATORY (INHALATION) EVERY 4 HOURS PRN
Status: DISCONTINUED | OUTPATIENT
Start: 2023-10-25 | End: 2023-10-30 | Stop reason: HOSPADM

## 2023-10-25 RX ADMIN — Medication 5 MG: at 21:04

## 2023-10-25 RX ADMIN — TACROLIMUS 3 MG: 1 CAPSULE ORAL at 21:04

## 2023-10-25 RX ADMIN — HEPARIN SODIUM 5000 UNITS: 5000 INJECTION INTRAVENOUS; SUBCUTANEOUS at 01:28

## 2023-10-25 RX ADMIN — ROPINIROLE HYDROCHLORIDE 1 MG: 1 TABLET, FILM COATED ORAL at 21:04

## 2023-10-25 RX ADMIN — ARFORMOTEROL TARTRATE 15 MCG: 15 SOLUTION RESPIRATORY (INHALATION) at 06:54

## 2023-10-25 RX ADMIN — HEPARIN SODIUM 5000 UNITS: 5000 INJECTION INTRAVENOUS; SUBCUTANEOUS at 21:08

## 2023-10-25 RX ADMIN — HEPARIN SODIUM 5000 UNITS: 5000 INJECTION INTRAVENOUS; SUBCUTANEOUS at 07:06

## 2023-10-25 RX ADMIN — Medication 10 ML: at 08:45

## 2023-10-25 RX ADMIN — CITALOPRAM HYDROBROMIDE 40 MG: 10 TABLET ORAL at 13:37

## 2023-10-25 RX ADMIN — ACETAMINOPHEN 650 MG: 325 TABLET ORAL at 16:46

## 2023-10-25 RX ADMIN — MIDODRINE HYDROCHLORIDE 5 MG: 5 TABLET ORAL at 08:42

## 2023-10-25 RX ADMIN — BUDESONIDE INHALATION 500 MCG: 0.5 SUSPENSION RESPIRATORY (INHALATION) at 18:58

## 2023-10-25 RX ADMIN — BUDESONIDE INHALATION 500 MCG: 0.5 SUSPENSION RESPIRATORY (INHALATION) at 06:54

## 2023-10-25 RX ADMIN — ASPIRIN 81 MG CHEWABLE TABLET 81 MG: 81 TABLET CHEWABLE at 08:42

## 2023-10-25 RX ADMIN — ROPINIROLE HYDROCHLORIDE 1 MG: 1 TABLET, FILM COATED ORAL at 01:01

## 2023-10-25 RX ADMIN — Medication 10 ML: at 21:08

## 2023-10-25 RX ADMIN — TACROLIMUS 3 MG: 1 CAPSULE ORAL at 01:01

## 2023-10-25 RX ADMIN — Medication 5 MG: at 01:28

## 2023-10-25 RX ADMIN — PANTOPRAZOLE SODIUM 40 MG: 40 TABLET, DELAYED RELEASE ORAL at 13:37

## 2023-10-25 RX ADMIN — MIDODRINE HYDROCHLORIDE 5 MG: 5 TABLET ORAL at 13:37

## 2023-10-25 RX ADMIN — ARFORMOTEROL TARTRATE 15 MCG: 15 SOLUTION RESPIRATORY (INHALATION) at 18:58

## 2023-10-25 RX ADMIN — ROPINIROLE HYDROCHLORIDE 1 MG: 1 TABLET, FILM COATED ORAL at 08:42

## 2023-10-25 RX ADMIN — MIDODRINE HYDROCHLORIDE 5 MG: 5 TABLET ORAL at 16:47

## 2023-10-25 RX ADMIN — HEPARIN SODIUM 5000 UNITS: 5000 INJECTION INTRAVENOUS; SUBCUTANEOUS at 13:37

## 2023-10-25 ASSESSMENT — PAIN DESCRIPTION - ORIENTATION: ORIENTATION: ANTERIOR

## 2023-10-25 ASSESSMENT — LIFESTYLE VARIABLES
HOW OFTEN DO YOU HAVE A DRINK CONTAINING ALCOHOL: NEVER
HOW MANY STANDARD DRINKS CONTAINING ALCOHOL DO YOU HAVE ON A TYPICAL DAY: PATIENT DOES NOT DRINK

## 2023-10-25 ASSESSMENT — PAIN DESCRIPTION - LOCATION
LOCATION: GENERALIZED
LOCATION: HEAD

## 2023-10-25 ASSESSMENT — PAIN - FUNCTIONAL ASSESSMENT: PAIN_FUNCTIONAL_ASSESSMENT: NONE - DENIES PAIN

## 2023-10-25 ASSESSMENT — PAIN SCALES - GENERAL
PAINLEVEL_OUTOF10: 9
PAINLEVEL_OUTOF10: 9
PAINLEVEL_OUTOF10: 0

## 2023-10-25 ASSESSMENT — PAIN DESCRIPTION - DESCRIPTORS: DESCRIPTORS: ACHING

## 2023-10-25 NOTE — PROGRESS NOTES
4 Eyes Skin Assessment     NAME:  Donald San  YOB: 1960  MEDICAL RECORD NUMBER:  [id number]    The patient is being assessed for  Admission    I agree that at least one RN has performed a thorough Head to Toe Skin Assessment on the patient. ALL assessment sites listed below have been assessed. Areas assessed by both nurses:    Head, Face, Ears, Shoulders, Back, Chest, Arms, Elbows, Hands, Sacrum. Buttock, Coccyx, Ischium, and Legs. Feet and Heels        Does the Patient have a Wound?  No noted wound(s)       Panfilo Prevention initiated by RN: No  Wound Care Orders initiated by RN: No    Pressure Injury (Stage 3,4, Unstageable, DTI, NWPT, and Complex wounds) if present, place Wound referral order by RN under : No    New Ostomies, if present place, Ostomy referral order under : No     Nurse 1 eSignature: Electronically signed by Ca Pendleton RN on 10/25/23 at 3:11 AM EDT    **SHARE this note so that the co-signing nurse can place an eSignature**    Nurse 2 eSignature: {Esignature:[id number]}

## 2023-10-25 NOTE — H&P
BUN 42 10/25/2023 04:53 AM    CREATININE 6.7 10/25/2023 04:53 AM    GFRAA 13 02/04/2021 06:45 AM    LABGLOM 7 10/25/2023 04:53 AM    GLUCOSE 101 10/25/2023 04:53 AM    PROT 6.0 10/25/2023 04:53 AM    LABALBU 3.4 10/25/2023 04:53 AM    CALCIUM 8.5 10/25/2023 04:53 AM    BILITOT 0.6 10/25/2023 04:53 AM    ALKPHOS 44 10/25/2023 04:53 AM    AST 20 10/25/2023 04:53 AM    ALT 13 10/25/2023 04:53 AM       ASSESSMENT/PLAN:  Meche Saleem DO  6:16 AM  10/25/2023    Electronically signed by Meche Saleem DO on 10/25/23 at 6:16 AM EDT

## 2023-10-25 NOTE — H&P
or eye problems. Cardiovascular:   Denies any chest pain, irregular heartbeats, or palpitations. Respiratory:   - dyspnea at rest, - dyspnea on exertion, - coughing, - sputum, - hemoptysis  Gastrointestinal:   + nausea, vomiting and diarrhea for months, - constipation. + poor appetite and poor intake. + cramping abdominal pain generally. Genitourinary:    Oliguric in the setting of end-stage renal disease on hemodialysis. Extremities:   Denies any acute lower extremity swelling. Neurology:    Denies any headache or focal neurological deficits, positive for postural dizziness. Positive for generalized weakness and fatigue without focal component  Psch:   Denies being anxious or depressed. Musculoskeletal:    Denies  myalgias, joint complaints or back pain. Integumentary:   Denies any rashes, ulcers, or excoriations. Denies bruising. Hematologic/Lymphatic:  Denies bruising or bleeding. PHYSICAL EXAM:  VITALS:  Vitals:    10/25/23 1230   BP: (!) 128/54   Pulse: 50   Resp:    Temp:    SpO2:          CONSTITUTIONAL:    Awake, alert, cooperative, acute on chronic ill appearance, no apparent distress    EYES:    PERRL, EOMI, sclera clear without icterus, conjunctiva normal    ENT:    Normocephalic, atraumatic, sinuses nontender on palpation. External ears without lesions. Oral pharynx with moist mucus membranes. NECK:    Supple, symmetrical, trachea midline, no adenopathy, thyroid symmetric, not enlarged and no tenderness, skin normal, no bruits, no JVD    HEMATOLOGIC/LYMPHATICS:    No cervical lymphadenopathy and no supraclavicular lymphadenopathy    LUNGS:    Symmetric.  No increased work of breathing, diminished bibasilar air exchange, coarse breath sounds to auscultation bilaterally, no wheezes, rhonchi, or rales,     CARDIOVASCULAR:    Normal apical impulse, regular rate and rhythm, normal S1 and S2, systolic murmur noted    ABDOMEN:    Obese contour, normal bowel sounds, soft, non-distended, non-tender, no rebound or guarding elicited on palpation     MUSCULOSKELETAL:    There is no redness, warmth, or swelling of the joints. Tone is normal.    NEUROLOGIC:    Awake, alert, oriented to name, place and time. Cranial nerves II-XII are grossly intact. Generally weak without focal finding. SKIN:    No bruising or bleeding. No redness, warmth, or swelling    EXTREMITIES:    Peripheral pulses present. No significant pitting peripheral edema.     LABORATORY DATA:  CBC with Differential:    Lab Results   Component Value Date/Time    WBC 2.3 10/25/2023 04:53 AM    RBC 2.44 10/25/2023 04:53 AM    HGB 8.6 10/25/2023 04:53 AM    HCT 26.3 10/25/2023 04:53 AM    PLT 72 10/25/2023 04:53 AM    .8 10/25/2023 04:53 AM    MCH 35.2 10/25/2023 04:53 AM    MCHC 32.7 10/25/2023 04:53 AM    RDW 16.6 10/25/2023 04:53 AM    NRBC 0.9 06/03/2023 04:31 AM    SEGSPCT 45 07/24/2013 01:15 PM    LYMPHOPCT 24 10/25/2023 04:53 AM    MONOPCT 3 10/25/2023 04:53 AM    MYELOPCT 1 10/02/2023 05:29 AM    BASOPCT 0 10/25/2023 04:53 AM    MONOSABS 0.06 10/25/2023 04:53 AM    LYMPHSABS 0.54 10/25/2023 04:53 AM    EOSABS 0.06 10/25/2023 04:53 AM    BASOSABS 0 10/25/2023 04:53 AM     CMP:    Lab Results   Component Value Date/Time     10/25/2023 04:53 AM    K 5.9 10/25/2023 04:53 AM    K 4.2 02/04/2021 06:45 AM    CL 93 10/25/2023 04:53 AM    CO2 30 10/25/2023 04:53 AM    BUN 42 10/25/2023 04:53 AM    CREATININE 6.7 10/25/2023 04:53 AM    GFRAA 13 02/04/2021 06:45 AM    LABGLOM 7 10/25/2023 04:53 AM    GLUCOSE 101 10/25/2023 04:53 AM    PROT 6.0 10/25/2023 04:53 AM    LABALBU 3.4 10/25/2023 04:53 AM    CALCIUM 8.5 10/25/2023 04:53 AM    BILITOT 0.6 10/25/2023 04:53 AM    ALKPHOS 44 10/25/2023 04:53 AM    AST 20 10/25/2023 04:53 AM    ALT 13 10/25/2023 04:53 AM     Recent Labs     10/24/23  1813   TROPHS 41*       ASSESSMENT:    -Near syncope in PCPs office  History of orthostatic hypotension, multifactorial  End-stage renal

## 2023-10-25 NOTE — PROGRESS NOTES
Syncope  Cirrhosis  Hypertension  Peripheral neuropathy  Dialysis patient  History of tobacco abuse  Hyperkalemia    Chronic hypoxic respiratory failure  Splenomegaly  Chronic diastolic congestive heart failure  Mild aortic regurgitation  Moderate tricuspid regurgitation  Severe pulmonary hypertension      Patient is 82-year-old female presents to ED due to syncopal event earlier today. Patient has been dealing with lightheadedness and dizziness. She was recently started on midodrine and states that it has helped but has not resolved her dizziness. However today she suffered a syncopal event at home. She did go to the for this as well and was noted to be hypoxic. She does admit to increased shortness of breath. She does admit to chest pain. Ann Marie Last

## 2023-10-25 NOTE — PLAN OF CARE
Problem: Safety - Adult  Goal: Free from fall injury  10/25/2023 1649 by Jose Geller RN  Outcome: Progressing     Problem: Pain  Goal: Verbalizes/displays adequate comfort level or baseline comfort level  Outcome: Progressing

## 2023-10-25 NOTE — PROGRESS NOTES
Pharmacist Review and Automatic Dose Adjustment of Prophylactic Enoxaparin         The reviewing pharmacist has made an adjustment to the ordered enoxaparin dose or converted to UFH per the approved [facility] protocol and table as identified below. Toney Davis is a 63 y.o. female. Recent Labs     10/24/23  1813   CREATININE 6.3*       Estimated Creatinine Clearance: 10 mL/min (A) (based on SCr of 6.3 mg/dL (H)). Recent Labs     10/24/23  1813   HGB 9.1*   HCT 26.7*   PLT 84*     No results for input(s): \"INR\" in the last 72 hours.     Height:   Ht Readings from Last 1 Encounters:   09/30/23 1.6 m (5' 3\")     Weight:  Wt Readings from Last 1 Encounters:   10/24/23 93.4 kg (205 lb 14.6 oz)               Plan: Based upon the patient's weight and renal function    Ordered: Enoxaparin 40mg SUBQ Daily    Changed/converted to    New Order: Heparin 5,000 units SUBQ TID      Thank you,  Rancho Epperson, [facility]  10/24/2023, 11:46 PM

## 2023-10-25 NOTE — PROGRESS NOTES
Date:10/25/2023  Patient Name: Giovana Bella  MRN: [id number]  : 1960  ROOM #: [id number]    Occupational Therapy order received, chart reviewed and evaluation attempted this date. Patient is unavailable for OT evaluation due to dialysis. Will attempt OT evaluation at a later time. Thank you.    Rashmi Estrella OTR/L #[id number]

## 2023-10-25 NOTE — PROGRESS NOTES
Physical Therapy  Physical Therapy    Room #: 6651/6453-55  Date: 10/25/2023       Patient Name: Darylene Kos  : 1960      MRN: [id number]     Patient unavailable for physical therapy eval due to off floor at dialysis. Will attempt PT evaluation at a later time. Thank you.        Keyshawn De La Torre, PT

## 2023-10-25 NOTE — ACP (ADVANCE CARE PLANNING)
Advance Care Planning   Healthcare Decision Maker:    Primary Decision Maker: Lisa [facility] - [phone]

## 2023-10-26 PROBLEM — I50.33 ACUTE ON CHRONIC HEART FAILURE WITH PRESERVED EJECTION FRACTION (HCC): Status: ACTIVE | Noted: 2023-10-26

## 2023-10-26 PROBLEM — I27.20 SEVERE PULMONARY HYPERTENSION (HCC): Status: ACTIVE | Noted: 2023-10-26

## 2023-10-26 PROBLEM — I38 VHD (VALVULAR HEART DISEASE): Status: ACTIVE | Noted: 2023-10-26

## 2023-10-26 PROBLEM — E87.5 HYPERKALEMIA: Status: ACTIVE | Noted: 2023-10-26

## 2023-10-26 PROBLEM — E66.01 SEVERE OBESITY (BMI 35.0-35.9 WITH COMORBIDITY) (HCC): Status: ACTIVE | Noted: 2023-10-26

## 2023-10-26 LAB
ALBUMIN SERPL-MCNC: 3.5 G/DL (ref 3.5–5.2)
ALP SERPL-CCNC: 48 U/L (ref 35–104)
ALT SERPL-CCNC: 13 U/L (ref 0–32)
ANION GAP SERPL CALCULATED.3IONS-SCNC: 13 MMOL/L (ref 7–16)
AST SERPL-CCNC: 22 U/L (ref 0–31)
BASOPHILS # BLD: 0 K/UL (ref 0–0.2)
BASOPHILS NFR BLD: 0 % (ref 0–2)
BILIRUB SERPL-MCNC: 0.7 MG/DL (ref 0–1.2)
BNP SERPL-MCNC: ABNORMAL PG/ML (ref 0–450)
BUN SERPL-MCNC: 32 MG/DL (ref 6–23)
CALCIUM SERPL-MCNC: 8.5 MG/DL (ref 8.6–10.2)
CHLORIDE SERPL-SCNC: 93 MMOL/L (ref 98–107)
CO2 SERPL-SCNC: 25 MMOL/L (ref 22–29)
CREAT SERPL-MCNC: 5.5 MG/DL (ref 0.5–1)
EOSINOPHIL # BLD: 0.03 K/UL (ref 0.05–0.5)
EOSINOPHILS RELATIVE PERCENT: 1 % (ref 0–6)
ERYTHROCYTE [DISTWIDTH] IN BLOOD BY AUTOMATED COUNT: 16.2 % (ref 11.5–15)
GFR SERPL CREATININE-BSD FRML MDRD: 8 ML/MIN/1.73M2
GLUCOSE SERPL-MCNC: 156 MG/DL (ref 74–99)
HCT VFR BLD AUTO: 29.1 % (ref 34–48)
HGB BLD-MCNC: 9.3 G/DL (ref 11.5–15.5)
LYMPHOCYTES NFR BLD: 0.68 K/UL (ref 1.5–4)
LYMPHOCYTES RELATIVE PERCENT: 23 % (ref 20–42)
MCH RBC QN AUTO: 34.4 PG (ref 26–35)
MCHC RBC AUTO-ENTMCNC: 32 G/DL (ref 32–34.5)
MCV RBC AUTO: 107.8 FL (ref 80–99.9)
MONOCYTES NFR BLD: 0.1 K/UL (ref 0.1–0.95)
MONOCYTES NFR BLD: 4 % (ref 2–12)
NEUTROPHILS NFR BLD: 73 % (ref 43–80)
NEUTS SEG NFR BLD: 2.19 K/UL (ref 1.8–7.3)
NUCLEATED RED BLOOD CELLS: 1 PER 100 WBC
PLATELET # BLD AUTO: 95 K/UL (ref 130–450)
PLATELET CONFIRMATION: NORMAL
PMV BLD AUTO: 10.1 FL (ref 7–12)
POTASSIUM SERPL-SCNC: 5.9 MMOL/L (ref 3.5–5)
PROCALCITONIN SERPL-MCNC: 0.34 NG/ML (ref 0–0.08)
PROT SERPL-MCNC: 6.7 G/DL (ref 6.4–8.3)
RBC # BLD AUTO: 2.7 M/UL (ref 3.5–5.5)
RBC # BLD: ABNORMAL 10*6/UL
SODIUM SERPL-SCNC: 131 MMOL/L (ref 132–146)
WBC OTHER # BLD: 3 K/UL (ref 4.5–11.5)

## 2023-10-26 PROCEDURE — 97161 PT EVAL LOW COMPLEX 20 MIN: CPT | Performed by: PHYSICAL THERAPIST

## 2023-10-26 PROCEDURE — 6360000002 HC RX W HCPCS: Performed by: INTERNAL MEDICINE

## 2023-10-26 PROCEDURE — 90935 HEMODIALYSIS ONE EVALUATION: CPT

## 2023-10-26 PROCEDURE — 6370000000 HC RX 637 (ALT 250 FOR IP): Performed by: INTERNAL MEDICINE

## 2023-10-26 PROCEDURE — APPSS60 APP SPLIT SHARED TIME 46-60 MINUTES: Performed by: PHYSICIAN ASSISTANT

## 2023-10-26 PROCEDURE — 85025 COMPLETE CBC W/AUTO DIFF WBC: CPT

## 2023-10-26 PROCEDURE — 6370000000 HC RX 637 (ALT 250 FOR IP)

## 2023-10-26 PROCEDURE — 97530 THERAPEUTIC ACTIVITIES: CPT | Performed by: PHYSICAL THERAPIST

## 2023-10-26 PROCEDURE — 1200000000 HC SEMI PRIVATE

## 2023-10-26 PROCEDURE — 83880 ASSAY OF NATRIURETIC PEPTIDE: CPT

## 2023-10-26 PROCEDURE — 36415 COLL VENOUS BLD VENIPUNCTURE: CPT

## 2023-10-26 PROCEDURE — 5A1D70Z PERFORMANCE OF URINARY FILTRATION, INTERMITTENT, LESS THAN 6 HOURS PER DAY: ICD-10-PCS | Performed by: INTERNAL MEDICINE

## 2023-10-26 PROCEDURE — 2580000003 HC RX 258: Performed by: INTERNAL MEDICINE

## 2023-10-26 PROCEDURE — 84145 PROCALCITONIN (PCT): CPT

## 2023-10-26 PROCEDURE — 97165 OT EVAL LOW COMPLEX 30 MIN: CPT

## 2023-10-26 PROCEDURE — 97530 THERAPEUTIC ACTIVITIES: CPT

## 2023-10-26 PROCEDURE — 6370000000 HC RX 637 (ALT 250 FOR IP): Performed by: PHYSICIAN ASSISTANT

## 2023-10-26 PROCEDURE — 99223 1ST HOSP IP/OBS HIGH 75: CPT | Performed by: INTERNAL MEDICINE

## 2023-10-26 PROCEDURE — 2700000000 HC OXYGEN THERAPY PER DAY

## 2023-10-26 PROCEDURE — 80053 COMPREHEN METABOLIC PANEL: CPT

## 2023-10-26 PROCEDURE — 94640 AIRWAY INHALATION TREATMENT: CPT

## 2023-10-26 PROCEDURE — 97535 SELF CARE MNGMENT TRAINING: CPT

## 2023-10-26 RX ORDER — SEVELAMER CARBONATE 800 MG/1
800 TABLET, FILM COATED ORAL
Status: DISCONTINUED | OUTPATIENT
Start: 2023-10-26 | End: 2023-10-30 | Stop reason: HOSPADM

## 2023-10-26 RX ORDER — MIDODRINE HYDROCHLORIDE 5 MG/1
10 TABLET ORAL
Status: DISCONTINUED | OUTPATIENT
Start: 2023-10-26 | End: 2023-10-30 | Stop reason: HOSPADM

## 2023-10-26 RX ADMIN — ROPINIROLE HYDROCHLORIDE 1 MG: 1 TABLET, FILM COATED ORAL at 21:32

## 2023-10-26 RX ADMIN — PRIMIDONE 50 MG: 50 TABLET ORAL at 09:56

## 2023-10-26 RX ADMIN — PANTOPRAZOLE SODIUM 40 MG: 40 TABLET, DELAYED RELEASE ORAL at 12:14

## 2023-10-26 RX ADMIN — ROPINIROLE HYDROCHLORIDE 1 MG: 1 TABLET, FILM COATED ORAL at 09:52

## 2023-10-26 RX ADMIN — BUDESONIDE INHALATION 500 MCG: 0.5 SUSPENSION RESPIRATORY (INHALATION) at 18:25

## 2023-10-26 RX ADMIN — TACROLIMUS 3 MG: 1 CAPSULE ORAL at 21:32

## 2023-10-26 RX ADMIN — ASPIRIN 81 MG CHEWABLE TABLET 81 MG: 81 TABLET CHEWABLE at 09:53

## 2023-10-26 RX ADMIN — Medication 5 MG: at 21:32

## 2023-10-26 RX ADMIN — IPRATROPIUM BROMIDE AND ALBUTEROL SULFATE 1 DOSE: .5; 2.5 SOLUTION RESPIRATORY (INHALATION) at 05:30

## 2023-10-26 RX ADMIN — MIDODRINE HYDROCHLORIDE 10 MG: 5 TABLET ORAL at 17:42

## 2023-10-26 RX ADMIN — SEVELAMER CARBONATE 800 MG: 800 TABLET, FILM COATED ORAL at 12:17

## 2023-10-26 RX ADMIN — BUDESONIDE INHALATION 500 MCG: 0.5 SUSPENSION RESPIRATORY (INHALATION) at 05:30

## 2023-10-26 RX ADMIN — ACETAMINOPHEN 650 MG: 325 TABLET ORAL at 10:56

## 2023-10-26 RX ADMIN — Medication 10 ML: at 09:56

## 2023-10-26 RX ADMIN — MIDODRINE HYDROCHLORIDE 5 MG: 5 TABLET ORAL at 09:52

## 2023-10-26 RX ADMIN — SEVELAMER CARBONATE 800 MG: 800 TABLET, FILM COATED ORAL at 17:42

## 2023-10-26 RX ADMIN — Medication 5 ML: at 21:31

## 2023-10-26 RX ADMIN — TACROLIMUS 3 MG: 1 CAPSULE ORAL at 09:52

## 2023-10-26 RX ADMIN — ARFORMOTEROL TARTRATE 15 MCG: 15 SOLUTION RESPIRATORY (INHALATION) at 18:24

## 2023-10-26 RX ADMIN — CITALOPRAM HYDROBROMIDE 40 MG: 10 TABLET ORAL at 12:14

## 2023-10-26 RX ADMIN — ARFORMOTEROL TARTRATE 15 MCG: 15 SOLUTION RESPIRATORY (INHALATION) at 05:30

## 2023-10-26 RX ADMIN — MIDODRINE HYDROCHLORIDE 5 MG: 5 TABLET ORAL at 12:14

## 2023-10-26 RX ADMIN — HEPARIN SODIUM 5000 UNITS: 5000 INJECTION INTRAVENOUS; SUBCUTANEOUS at 17:43

## 2023-10-26 ASSESSMENT — PAIN SCALES - GENERAL: PAINLEVEL_OUTOF10: 0

## 2023-10-26 NOTE — PROGRESS NOTES
Department of Internal Medicine      Primary Care Physician: Juana Barnett DO   Admitting Physician:  Yashira Billings DO  Admission date and time: 10/24/2023  4:10 PM    Room:  [address] [place] [address]  Admitting diagnosis: Syncope and collapse [R55]  Hyperkalemia [E87.5]  Near syncope [R55]  Stage 5 chronic kidney disease on chronic dialysis ([address]) [N18.6, Z99.2]  Nausea and vomiting, unspecified vomiting type [R11.2]    Patient Name: Juli Ochoa  MRN: [id number]    Date of Service: 10/26/2023     Chief Complaint:  Dizziness    HISTORY OF PRESENT ILLNESS:    Juli Ochoa is a 62-year-old female patient who presented to [facility] for evaluation of having a near syncopal episode at her PCPs office yesterday. The patient went to see her PCP and they told her blood pressure was very high. Patient is on midodrine with a history of significant orthostatic hypotension during last admission with the patient being discharged October 4. Patient became very nauseated with the episode in the PCPs office. Patient denied problem with chest pain, palpitations or unusual shortness of breath. There is no fever/chills. Patient states that her O2 sat was in the 50s in her PCPs office. Patient's blood pressure in the ED initially was 127/59 with a heart rate of 64 and O2 saturation 92% on 3 L. Patient normally wears 3 L at home. Today temperature 97.5 with blood pressure 128/54. Heart rate ranges 50-72. O2 sat 95% on 6 L. Chest x-ray last night showed no acute process. CT of the abdomen yesterday showed no mechanical obstructive process with mild-moderate proximal colonic stool burden. Patient has splenomegaly and thought to have a trace or small right pleural effusion with some atelectasis. Procalcitonin today was 0.35    10/26/2023  Patient seen examined on telemetry floor. Patient is on 5 L nasal cannula with O2 sat 99%. Vital signs from last night are stable. Pending lab work this morning.   Patient feels little

## 2023-10-26 NOTE — PROGRESS NOTES
Standing: good rollator     Patient is Alert & Oriented x person, place, time, and situation and follows directions    Sensation:  Patient  denies numbness/tingling   Edema:  no   Endurance: fair  -    Vitals:  4 liters nasal cannula   Blood Pressure at rest  Blood Pressure during session    Heart Rate at rest 62 Heart Rate during session 74   SPO2 at rest 93%  SPO2 during session 84% cues purse lip breathing increased to 6 liters x 2 minutes until recovery 90% for 2nd gait rep     Patient education  Patient educated on role of Physical Therapy, risks of immobility, safety and plan of care,  importance of mobility while in hospital , purse lip breathing, ankle pumps, quad set and glut set for edema control, blood clot prevention, and O2 line management and safety      Patient response to education:   Pt verbalized understanding Pt demonstrated skill Pt requires further education in this area   Yes Partial Yes      Treatment:  Patient practiced and was instructed/facilitated in the following treatment: Patient assisted to edge of bed,   Sat edge of bed 5 minutes with Supervision  to increase dynamic sitting balance and activity tolerance. Donned outer gown, ambulated in hallway, seated rest, ambulated back to room, assisted up to chair       Therapeutic Exercises:  not performed  x   reps. At end of session, patient in chair with  lunch tray  call light and phone within reach,  all lines and tubes intact, nursing notified. Patient would benefit from continued skilled Physical Therapy to improve functional independence and quality of life.          Patient's/ family goals   home    Time in  1142  Time out  1215    Total Treatment Time  13 minutes    Evaluation time includes thorough review of current medical information, gathering information on past medical history/social history and prior level of function, completion of standardized testing/informal observation of tasks, assessment of data, and development of Plan of care and goals.      CPT codes:  Low Complexity PT evaluation (69869)  Therapeutic activities (67694)   13 minutes  1 unit(s)    Migue Duncan, PT

## 2023-10-26 NOTE — PROGRESS NOTES
[facility]  [address] . [place]        Date:10/26/2023                                                  Patient Name: Sergei Winter    MRN: [id number]    : 1960    Room: [address] [place] [address]      Evaluating OT: Kiara Gill OTR/L; [id number]     Referring Provider and Specific Provider Orders/Date:      10/25/23 0630  OT eval and treat  Start:  10/25/23 0630,   End:  10/25/23 0630,   ONE TIME,   Standing Count:  1 Occurrences,   R         Deborah Davis U, DO      Placement Recommendation: Subacute        Diagnosis:   1. Hyperkalemia    2. Nausea and vomiting, unspecified vomiting type    3. Near syncope    4. Stage 5 chronic kidney disease on chronic dialysis [facility])         Surgery: none       Pertinent Medical History:       Past Medical History:   Diagnosis Date    Arthritis     Cirrhosis of liver ([address])     Depression     Eye abnormalities     blood behid retina    Hernia of abdominal wall     Hypertension     Neuropathy          Past Surgical History:   Procedure Laterality Date    CARDIAC CATHETERIZATION  2023    [facility]    CHOLECYSTECTOMY      EYE SURGERY      Bilateral eye \"old blood taken out about 2 yes ago 2019\"    FRACTURE SURGERY      HYSTERECTOMY (CERVIX STATUS UNKNOWN)      LIVER TRANSPLANT      SHOULDER SURGERY Right     SHUNT REVISION Left 2021    AV GRAFT LEFT ARM performed by Paige Eli MD at [address] [place] [address]        Precautions:  Fall Risk, 4L O2, dialysis, COPD, monitor O2 saturation     Vitals: O2 saturation on 4L O2 during ambulation was 78%, titrated O2 to 6-8L and pt recovered to 88%-91%. Pt returned to room and was seated in bedside chair on 4L with O2 saturation 93%.       Assessment of current deficits:     [x] Functional mobility  [x]ADLs  [x] Strength               []Cognition    [x] Functional transfers   [x] IADLs         [] Safety Awareness cues for walker sequence and safety. Modified [place]    Balance Sitting:     Static: good     Dynamic: fair   Standing: fair  with rollator   Sitting:     Static: good     Dynamic: good   Standing: good  with rollatr    Activity Tolerance Fair minus  good    Visual/  Perceptual Glasses: yes                 Hand Dominance: right      AROM (PROM) Strength Additional Info:  Goal:   RUE  WFL 4/5 good  and wfl FMC/dexterity noted during ADL tasks   Improve overall RUE strength  for participation in functional tasks   LUE WFL 4/5 good  and wfl FMC/dexterity noted during ADL tasks   Improve overall LUE strength  for participation in functional tasks     Hearing: [facility]   Sensation:  No c/o numbness or tingling  Tone: WFL   Edema: no     Comments: Upon arrival the patient was supine. At end of session, patient was seated in bedside chair with call light and phone within reach, all lines and tubes intact. Overall patient demonstrated decreased independence and safety during completion of ADL/functional transfer/mobility tasks. Pt would benefit from continued skilled OT to increase safety and independence with completion of ADL/IADL tasks for functional independence and quality of life. Treatment: OT treatment provided this date includes:   Instruction/training on safety and adapted techniques for completion of ADLs   Instruction/training on safe functional mobility/transfer techniques   Instruction/training on energy conservation/work simplification for completion of ADLs   Instruction/training on proper positioning/alignment to prevent contractures     Rehab Potential: Good for established goals. Patient / Family Goal: return home       Patient and/or family were instructed on functional diagnosis, prognosis/goals and OT plan of care. Demonstrated good understanding.      Eval Complexity: Low    Time In: 10:00am   Time Out: 10:38am    Total Treatment Time: 23      Min Units   OT Eval Low 97165  X  1

## 2023-10-26 NOTE — CONSULTS
[facility] MD Fransisco Summers MD Zachary Hedger MD .  Consultation  Patient's Name: Meet Liz  11:20 PM  10/25/2023    Nephrologist: Saúl Aguilar MD    Reason for Consult:  ESRD   Requesting Physician:  Jona Azevedo DO    Chief Complaint:  dizziness     History Obtained From:  patient records staff     History of Present Ilness:      64 y/o F kown to our service   Pt presenting to hospital with cc of presyncope   Pt was at Dr [address] [place] [address] office and was told her bp high   Pt has hx of ESRD and is on HD MWF via LUE AVF   Pt seen on HD she is tolerating treatment   Pt had recent admission for orthostatic hypotension and was dc on midodrine . Past Medical History:   Diagnosis Date    Arthritis     Cirrhosis of liver ([address])     Depression     Eye abnormalities     blood behid retina    Hernia of abdominal wall     Hypertension     Neuropathy        Past Surgical History:   Procedure Laterality Date    CARDIAC CATHETERIZATION  06/06/2023    [facility]    CHOLECYSTECTOMY      EYE SURGERY      Bilateral eye \"old blood taken out about 2 yes ago 2019\"    FRACTURE SURGERY      HYSTERECTOMY (CERVIX STATUS UNKNOWN)      LIVER TRANSPLANT  2015    SHOULDER SURGERY Right     SHUNT REVISION Left 02/04/2021    AV GRAFT LEFT ARM performed by Parveen Quiñonez MD at [facility]         No family history on file. reports that she quit smoking about 4 years ago. Her smoking use included cigarettes. She smoked an average of .5 packs per day. She has never used smokeless tobacco. She reports that she does not drink alcohol and does not use drugs.     Allergies:  Tape [adhesive tape]    Current Medications:    ipratropium 0.5 mg-albuterol 2.5 mg (DUONEB) nebulizer solution 1 Dose, Q4H PRN  glucose chewable tablet 16 g, PRN  dextrose bolus 10% 125 mL, PRN   Or  dextrose bolus 10% 250 mL, PRN  glucagon injection 1 mg, PRN  dextrose 10 % infusion, Continuous PRN  sodium , no wheezing , good flow heard b/l   Abd : soft , NT , BS + , No Organomegaly appreciated . Skin : soft, dry . Neuro : CN  II-XII grossly intact , no focal neurologic deficit . Psych : cooperative .      Data:   Labs:  CBC with Differential:    Lab Results   Component Value Date/Time    WBC 2.3 10/25/2023 04:53 AM    RBC 2.44 10/25/2023 04:53 AM    HGB 8.6 10/25/2023 04:53 AM    HCT 26.3 10/25/2023 04:53 AM    PLT 72 10/25/2023 04:53 AM    .8 10/25/2023 04:53 AM    MCH 35.2 10/25/2023 04:53 AM    MCHC 32.7 10/25/2023 04:53 AM    RDW 16.6 10/25/2023 04:53 AM    NRBC 0.9 06/03/2023 04:31 AM    SEGSPCT 45 07/24/2013 01:15 PM    LYMPHOPCT 24 10/25/2023 04:53 AM    MONOPCT 3 10/25/2023 04:53 AM    MYELOPCT 1 10/02/2023 05:29 AM    BASOPCT 0 10/25/2023 04:53 AM    MONOSABS 0.06 10/25/2023 04:53 AM    LYMPHSABS 0.54 10/25/2023 04:53 AM    EOSABS 0.06 10/25/2023 04:53 AM    BASOSABS 0 10/25/2023 04:53 AM     CMP:    Lab Results   Component Value Date/Time     10/25/2023 04:53 AM    K 5.9 10/25/2023 04:53 AM    K 4.2 02/04/2021 06:45 AM    CL 93 10/25/2023 04:53 AM    CO2 30 10/25/2023 04:53 AM    BUN 42 10/25/2023 04:53 AM    CREATININE 6.7 10/25/2023 04:53 AM    GFRAA 13 02/04/2021 06:45 AM    LABGLOM 7 10/25/2023 04:53 AM    GLUCOSE 101 10/25/2023 04:53 AM    PROT 6.0 10/25/2023 04:53 AM    LABALBU 3.4 10/25/2023 04:53 AM    CALCIUM 8.5 10/25/2023 04:53 AM    BILITOT 0.6 10/25/2023 04:53 AM    ALKPHOS 44 10/25/2023 04:53 AM    AST 20 10/25/2023 04:53 AM    ALT 13 10/25/2023 04:53 AM     Ionized Calcium:  No results found for: \"IONCA\"  Magnesium:    Lab Results   Component Value Date/Time    MG 2.0 10/25/2023 04:53 AM     Phosphorus:    Lab Results   Component Value Date/Time    PHOS 6.5 10/25/2023 04:53 AM     U/A:    Lab Results   Component Value Date/Time    COLORU YELLOW 07/24/2013 01:12 PM    PHUR 7.5 07/24/2013 01:12 PM    WBCUA NONE 07/24/2013 01:12 PM    RBCUA NONE 07/24/2013 01:12 PM    BACTERIA

## 2023-10-27 LAB
ALBUMIN SERPL-MCNC: 3.6 G/DL (ref 3.5–5.2)
ALP SERPL-CCNC: 48 U/L (ref 35–104)
ALT SERPL-CCNC: 11 U/L (ref 0–32)
ANION GAP SERPL CALCULATED.3IONS-SCNC: 11 MMOL/L (ref 7–16)
AST SERPL-CCNC: 20 U/L (ref 0–31)
BASOPHILS # BLD: 0.02 K/UL (ref 0–0.2)
BASOPHILS NFR BLD: 1 % (ref 0–2)
BILIRUB SERPL-MCNC: 0.6 MG/DL (ref 0–1.2)
BUN SERPL-MCNC: 30 MG/DL (ref 6–23)
CALCIUM SERPL-MCNC: 8.6 MG/DL (ref 8.6–10.2)
CHLORIDE SERPL-SCNC: 92 MMOL/L (ref 98–107)
CO2 SERPL-SCNC: 27 MMOL/L (ref 22–29)
CREAT SERPL-MCNC: 4.8 MG/DL (ref 0.5–1)
EOSINOPHIL # BLD: 0.07 K/UL (ref 0.05–0.5)
EOSINOPHILS RELATIVE PERCENT: 2 % (ref 0–6)
ERYTHROCYTE [DISTWIDTH] IN BLOOD BY AUTOMATED COUNT: 15.9 % (ref 11.5–15)
GFR SERPL CREATININE-BSD FRML MDRD: 10 ML/MIN/1.73M2
GLUCOSE SERPL-MCNC: 78 MG/DL (ref 74–99)
HCT VFR BLD AUTO: 28 % (ref 34–48)
HGB BLD-MCNC: 9.5 G/DL (ref 11.5–15.5)
IMM GRANULOCYTES # BLD AUTO: <0.03 K/UL (ref 0–0.58)
IMM GRANULOCYTES NFR BLD: 0 % (ref 0–5)
LYMPHOCYTES NFR BLD: 0.86 K/UL (ref 1.5–4)
LYMPHOCYTES RELATIVE PERCENT: 25 % (ref 20–42)
MCH RBC QN AUTO: 36.3 PG (ref 26–35)
MCHC RBC AUTO-ENTMCNC: 33.9 G/DL (ref 32–34.5)
MCV RBC AUTO: 106.9 FL (ref 80–99.9)
MONOCYTES NFR BLD: 0.27 K/UL (ref 0.1–0.95)
MONOCYTES NFR BLD: 8 % (ref 2–12)
NEUTROPHILS NFR BLD: 64 % (ref 43–80)
NEUTS SEG NFR BLD: 2.2 K/UL (ref 1.8–7.3)
PLATELET # BLD AUTO: 101 K/UL (ref 130–450)
PMV BLD AUTO: 10.5 FL (ref 7–12)
POTASSIUM SERPL-SCNC: 5.3 MMOL/L (ref 3.5–5)
PROT SERPL-MCNC: 6.8 G/DL (ref 6.4–8.3)
RBC # BLD AUTO: 2.62 M/UL (ref 3.5–5.5)
SODIUM SERPL-SCNC: 130 MMOL/L (ref 132–146)
WBC OTHER # BLD: 3.4 K/UL (ref 4.5–11.5)

## 2023-10-27 PROCEDURE — 85025 COMPLETE CBC W/AUTO DIFF WBC: CPT

## 2023-10-27 PROCEDURE — 90935 HEMODIALYSIS ONE EVALUATION: CPT

## 2023-10-27 PROCEDURE — 99233 SBSQ HOSP IP/OBS HIGH 50: CPT | Performed by: INTERNAL MEDICINE

## 2023-10-27 PROCEDURE — 80053 COMPREHEN METABOLIC PANEL: CPT

## 2023-10-27 PROCEDURE — 6360000002 HC RX W HCPCS: Performed by: INTERNAL MEDICINE

## 2023-10-27 PROCEDURE — 97530 THERAPEUTIC ACTIVITIES: CPT | Performed by: PHYSICAL THERAPIST

## 2023-10-27 PROCEDURE — 6370000000 HC RX 637 (ALT 250 FOR IP): Performed by: INTERNAL MEDICINE

## 2023-10-27 PROCEDURE — 6370000000 HC RX 637 (ALT 250 FOR IP): Performed by: PHYSICIAN ASSISTANT

## 2023-10-27 PROCEDURE — 6370000000 HC RX 637 (ALT 250 FOR IP)

## 2023-10-27 PROCEDURE — 36415 COLL VENOUS BLD VENIPUNCTURE: CPT

## 2023-10-27 PROCEDURE — 2580000003 HC RX 258: Performed by: INTERNAL MEDICINE

## 2023-10-27 PROCEDURE — 94640 AIRWAY INHALATION TREATMENT: CPT

## 2023-10-27 PROCEDURE — 1200000000 HC SEMI PRIVATE

## 2023-10-27 PROCEDURE — 2700000000 HC OXYGEN THERAPY PER DAY

## 2023-10-27 RX ORDER — PROCHLORPERAZINE EDISYLATE 5 MG/ML
10 INJECTION INTRAMUSCULAR; INTRAVENOUS EVERY 6 HOURS PRN
Status: DISCONTINUED | OUTPATIENT
Start: 2023-10-27 | End: 2023-10-30 | Stop reason: HOSPADM

## 2023-10-27 RX ADMIN — ACETAMINOPHEN 650 MG: 325 TABLET ORAL at 18:55

## 2023-10-27 RX ADMIN — BUDESONIDE INHALATION 500 MCG: 0.5 SUSPENSION RESPIRATORY (INHALATION) at 05:39

## 2023-10-27 RX ADMIN — HEPARIN SODIUM 5000 UNITS: 5000 INJECTION INTRAVENOUS; SUBCUTANEOUS at 14:34

## 2023-10-27 RX ADMIN — ARFORMOTEROL TARTRATE 15 MCG: 15 SOLUTION RESPIRATORY (INHALATION) at 05:39

## 2023-10-27 RX ADMIN — PANTOPRAZOLE SODIUM 40 MG: 40 TABLET, DELAYED RELEASE ORAL at 14:23

## 2023-10-27 RX ADMIN — Medication 10 ML: at 14:32

## 2023-10-27 RX ADMIN — PRIMIDONE 50 MG: 50 TABLET ORAL at 14:13

## 2023-10-27 RX ADMIN — HEPARIN SODIUM 5000 UNITS: 5000 INJECTION INTRAVENOUS; SUBCUTANEOUS at 06:20

## 2023-10-27 RX ADMIN — ROPINIROLE HYDROCHLORIDE 1 MG: 1 TABLET, FILM COATED ORAL at 14:13

## 2023-10-27 RX ADMIN — Medication 5 MG: at 20:18

## 2023-10-27 RX ADMIN — ASPIRIN 81 MG CHEWABLE TABLET 81 MG: 81 TABLET CHEWABLE at 14:14

## 2023-10-27 RX ADMIN — SEVELAMER CARBONATE 800 MG: 800 TABLET, FILM COATED ORAL at 18:12

## 2023-10-27 RX ADMIN — MIDODRINE HYDROCHLORIDE 10 MG: 5 TABLET ORAL at 18:12

## 2023-10-27 RX ADMIN — TACROLIMUS 3 MG: 1 CAPSULE ORAL at 21:24

## 2023-10-27 RX ADMIN — ACETAMINOPHEN 650 MG: 325 TABLET ORAL at 23:43

## 2023-10-27 RX ADMIN — ARFORMOTEROL TARTRATE 15 MCG: 15 SOLUTION RESPIRATORY (INHALATION) at 17:20

## 2023-10-27 RX ADMIN — MIDODRINE HYDROCHLORIDE 10 MG: 5 TABLET ORAL at 09:00

## 2023-10-27 RX ADMIN — ROPINIROLE HYDROCHLORIDE 1 MG: 1 TABLET, FILM COATED ORAL at 20:19

## 2023-10-27 RX ADMIN — HEPARIN SODIUM 5000 UNITS: 5000 INJECTION INTRAVENOUS; SUBCUTANEOUS at 20:19

## 2023-10-27 RX ADMIN — TACROLIMUS 3 MG: 1 CAPSULE ORAL at 14:14

## 2023-10-27 RX ADMIN — PROCHLORPERAZINE EDISYLATE 10 MG: 5 INJECTION INTRAMUSCULAR; INTRAVENOUS at 14:14

## 2023-10-27 RX ADMIN — BUDESONIDE INHALATION 500 MCG: 0.5 SUSPENSION RESPIRATORY (INHALATION) at 17:20

## 2023-10-27 RX ADMIN — Medication 10 ML: at 20:22

## 2023-10-27 RX ADMIN — CITALOPRAM HYDROBROMIDE 40 MG: 10 TABLET ORAL at 14:23

## 2023-10-27 RX ADMIN — MIDODRINE HYDROCHLORIDE 10 MG: 5 TABLET ORAL at 14:14

## 2023-10-27 RX ADMIN — SEVELAMER CARBONATE 800 MG: 800 TABLET, FILM COATED ORAL at 14:13

## 2023-10-27 ASSESSMENT — PAIN SCALES - GENERAL: PAINLEVEL_OUTOF10: 0

## 2023-10-27 NOTE — PROGRESS NOTES
Physical Therapy Treatment Note/Plan of Care    Room #:  2536/8637-17  Patient Name: Toney Davis  YOB: 1960  MRN: [id number]    Date of Service: 10/27/2023     Tentative placement recommendation: Home with Home Health Physical Therapy versus subacute if goals not met  Equipment recommendation: To be determined      Evaluating Physical Therapist: Cheri Russo, PT #[id number]      Specific Provider Orders/Date/Referring Provider :  10/25/23 0630    PT eval and treat  Start:  10/25/23 0630,   End:  10/25/23 0630,   ONE TIME,   Standing Count:  1 Occurrences,   R         Ellis Velazco U, DO     Admitting Diagnosis:   Syncope and collapse [R55]  Hyperkalemia [E87.5]  Near syncope [R55]  Stage 5 chronic kidney disease on chronic dialysis ([address]) [N18.6, Z99.2]  Nausea and vomiting, unspecified vomiting type [R11.2]     near syncopal episode at her PCPs office today. Patient states that she went to see her PCP and they told her that her blood pressure was really high. Surgery: none  Visit Diagnoses         Codes    Nausea and vomiting, unspecified vomiting type     R11.2    Near syncope     R55            Patient Active Problem List   Diagnosis    Encounter regarding vascular access for dialysis for ESRD ([address])    Stage 5 chronic kidney disease on chronic dialysis ([address])    Chest pain    Drug-induced tremor    Orthostatic hypotension    Syncope and collapse    Hyperkalemia    Severe pulmonary hypertension (HCC)    Severe obesity (BMI 35.0-35.9 with comorbidity) ([address])    Acute on chronic heart failure with preserved ejection fraction (HCC)    VHD (valvular heart disease)        ASSESSMENT of Current Deficits Patient exhibits decreased strength, balance, and endurance impairing functional mobility, transfers, gait , gait distance, and tolerance to activity increased shortness of breath and decreased O2 saturation during gait requires seated rest and cues purse lip breathing.  Patient requires continued skilled Neuropathy      Past Surgical History:   Procedure Laterality Date    CARDIAC CATHETERIZATION  06/06/2023    [facility]    CHOLECYSTECTOMY      EYE SURGERY      Bilateral eye \"old blood taken out about 2 yes ago 2019\"    FRACTURE SURGERY      HYSTERECTOMY (CERVIX STATUS UNKNOWN)      LIVER TRANSPLANT  2015    SHOULDER SURGERY Right     SHUNT REVISION Left 02/04/2021    AV GRAFT LEFT ARM performed by Mayelin iLnk MD at [address]:    Precautions: Up with assistance, falls, alarm, and O2 ,      Imaging results: CT CHEST WO CONTRAST    Result Date: 10/25/2023  EXAMINATION: CT OF THE CHEST WITHOUT CONTRAST 10/25/2023 2:58 pm      No sign of active disease in the chest. Stable appearance of rounded atelectasis in the posterior right lower lobe. Stable mild bilateral posterior pleural thickening. Stable appearance of probable liver transplantation with stable splenomegaly and stable mild ascites. XR CHEST 1 VIEW    Result Date: 10/24/2023  EXAMINATION: ONE XRAY VIEW OF THE CHEST 10/24/2023 8:18 pm    No acute process. Cardiomegaly. CT ABDOMEN PELVIS W IV CONTRAST Additional Contrast? None    Result Date: 10/24/2023  EXAMINATION: CT OF THE ABDOMEN AND PELVIS WITH CONTRAST 10/24/2023 8:25 pm    No mechanical obstructive process of bowel with mild to moderate proximal colonic stool burden. Sigmoid diverticulosis without acute diverticulitis. Splenomegaly up to 15.9 cm in maximum transaxial dimension appears mildly increased in the interim from 15.2 Lung bases partially evaluated reveal trace to small right pleural effusion with adjacent atelectasis similar to prior comparison     CT HEAD WO CONTRAST    Result Date: 10/24/2023  EXAMINATION: CT OF THE HEAD WITHOUT CONTRAST  10/24/2023 8:25 pm    No acute intracranial abnormality.      Social history: Patient lives with significant other Harriett Dominguez in a townhouse 2 story  Bed/bath are on 2nd level with 13 steps and rail with 1 step,

## 2023-10-27 NOTE — DISCHARGE INSTRUCTIONS
[address] call you to schedule home care visit for nursing/therapy      Your information:  Name: Ketty Herron  : 1960    Your instructions:    YOU ARE BEING DISCHARGED HOME. PLEASE MAKE AND KEEP YOUR FOLLOW UP APPOINTMENTS. IF YOU EXPERIENCE ANY OF THE FOLLOWING SYMPTOMS, CHEST PAIN, SHORTNESS OF BREATH, COUGHING UP BLOOD OR BLOODY SPUTUM, STOMACH PAIN OR CRAMPING, DARK, TARRY STOOLS, LOSS OF APPETITE, GENERAL NOT FEELING WELL, SIGNS AND SYMPTOMS OF INFECTION LIKE FEVER AND OR CHILLS, PLEASE CALL DR FANTA JUNG OR RETURN TO THE EMERGENCY ROOM. What to do after you leave the hospital:    Recommended diet: renal diet    Recommended activity: activity as tolerated        The following personal items were collected during your admission and were returned to you:    Belongings  Dental Appliances: None  Vision - Corrective Lenses: None  Hearing Aid: None  Clothing: Dress, Undergarments, Footwear, Pants, Shirt  Jewelry: Ring  Electronic Devices: Cell Phone  Weapons (Notify Protective Services/Security): None  Home Medications: None  Valuables Given To: Patient  Provide Name(s) of Who Valuable(s) Were Given To: Zoila Pope obtained by:  By signing below, I understand that if any problems occur once I leave the hospital I am to contact Dr Carl Lopez. I understand and acknowledge receipt of the instructions indicated above.

## 2023-10-27 NOTE — PROGRESS NOTES
33.9 10/27/2023 05:39 AM    RDW 15.9 10/27/2023 05:39 AM    NRBC 1 10/26/2023 09:34 AM    SEGSPCT 45 07/24/2013 01:15 PM    LYMPHOPCT 25 10/27/2023 05:39 AM    MONOPCT 8 10/27/2023 05:39 AM    MYELOPCT 1 10/02/2023 05:29 AM    BASOPCT 1 10/27/2023 05:39 AM    MONOSABS 0.27 10/27/2023 05:39 AM    LYMPHSABS 0.86 10/27/2023 05:39 AM    EOSABS 0.07 10/27/2023 05:39 AM    BASOSABS 0.02 10/27/2023 05:39 AM     CMP:    Lab Results   Component Value Date/Time     10/27/2023 05:39 AM    K 5.3 10/27/2023 05:39 AM    K 4.2 02/04/2021 06:45 AM    CL 92 10/27/2023 05:39 AM    CO2 27 10/27/2023 05:39 AM    BUN 30 10/27/2023 05:39 AM    CREATININE 4.8 10/27/2023 05:39 AM    GFRAA 13 02/04/2021 06:45 AM    LABGLOM 10 10/27/2023 05:39 AM    GLUCOSE 78 10/27/2023 05:39 AM    PROT 6.8 10/27/2023 05:39 AM    LABALBU 3.6 10/27/2023 05:39 AM    CALCIUM 8.6 10/27/2023 05:39 AM    BILITOT 0.6 10/27/2023 05:39 AM    ALKPHOS 48 10/27/2023 05:39 AM    AST 20 10/27/2023 05:39 AM    ALT 11 10/27/2023 05:39 AM     Recent Labs     10/24/23  1813   TROPHS 41*       ASSESSMENT:    -Near syncope in PCPs office  History of orthostatic hypotension, multifactorial  Chest pain  End-stage renal disease on hemodialysis  Elevated troponin with mild nonobstructive coronary artery disease noted on June 6, 2023 cardiac catheterization  History of COPD  Acute on chronic hypoxic respiratory failure-normally on 3 L nasal cannula and was on 6 L nasal cannula after admission  Essential hypertension  History of liver transplant 2015  Hyperlipidemia  Chronic macrocytic anemia with thrombocytopenia  Obesity with BMI 34.91 kg meter squared  Depression/anxiety  Chronic constipation  Intermittent second-degree heart block    PLAN:  Wade Galeazzi is a 60-year-old female who presented with near syncope in PCPs office    Home medication reviewed  Admit to monitored bed  Blood pressures minus standing  CT of the chest without contrast  IV Lexiscan stress test before patient is discharged  Procalcitonin in a.m. DuoNeb aerosols every 4 hours as needed  Pulmicort aerosol twice daily  Brovana aerosol twice daily    Consult cardiology  Compazine 10 mg IV push every 6 hours as needed for nausea      BMP, CBC in a.m.       Tish Reardon DO  9:57 AM  10/27/2023

## 2023-10-27 NOTE — PROGRESS NOTES
[facility] MD Sameer Summers, MD Marilia Solis, NORY Oden, GREG Tan CNP  Progress Note    10/27/2023    SUBJECTIVE:   10/26: Sitting up in bed. No acute distress. Does still have dyspnea and lightheadedness with exertion. Appetite is good. Oxygen down to 5 L today. 10/27 seen on HD tolerating treatment weak bp stable on o2/nc    PROBLEM LIST:    Principal Problem:    Syncope and collapse  Active Problems:    Stage 5 chronic kidney disease on chronic dialysis (HCC)    Hyperkalemia    Severe pulmonary hypertension (HCC)    Severe obesity (BMI 35.0-35.9 with comorbidity) (HCC)    Acute on chronic heart failure with preserved ejection fraction (HCC)    VHD (valvular heart disease)  Resolved Problems:    * No resolved hospital problems. *         DIET:    ADULT DIET; Regular; Low Potassium (Less than 3000 mg/day);  Low Phosphorus (Less than 1000 mg)     MEDS (scheduled):    sevelamer  800 mg Oral TID WC    midodrine  10 mg Oral TID WC    sodium chloride flush  5-40 mL IntraVENous 2 times per day    aspirin  81 mg Oral Daily    citalopram  40 mg Oral Lunch    melatonin  5 mg Oral Nightly    pantoprazole  40 mg Oral Lunch    primidone  50 mg Oral Daily    rOPINIRole  1 mg Oral BID    senna  2 tablet Oral Daily    tacrolimus  3 mg Oral BID    sodium chloride  500 mL IntraVENous Once    heparin (porcine)  5,000 Units SubCUTAneous 3 times per day    arformoterol tartrate  15 mcg Nebulization BID RT    And    budesonide  0.5 mg Nebulization BID RT       MEDS (infusions):   dextrose      sodium chloride         MEDS (prn):  prochlorperazine, ipratropium 0.5 mg-albuterol 2.5 mg, glucose, dextrose bolus **OR** dextrose bolus, glucagon (rDNA), dextrose, sodium chloride flush, sodium chloride, polyethylene glycol, acetaminophen **OR** acetaminophen    PHYSICAL EXAM:     Patient Vitals for the past 24 hrs:   BP Temp Temp src Pulse Resp SpO2 Weight

## 2023-10-28 LAB
ALBUMIN SERPL-MCNC: 3.6 G/DL (ref 3.5–5.2)
ALP SERPL-CCNC: 46 U/L (ref 35–104)
ALT SERPL-CCNC: 13 U/L (ref 0–32)
ANION GAP SERPL CALCULATED.3IONS-SCNC: 10 MMOL/L (ref 7–16)
AST SERPL-CCNC: 23 U/L (ref 0–31)
BASOPHILS # BLD: 0 K/UL (ref 0–0.2)
BASOPHILS NFR BLD: 0 % (ref 0–2)
BILIRUB SERPL-MCNC: 0.8 MG/DL (ref 0–1.2)
BUN SERPL-MCNC: 21 MG/DL (ref 6–23)
CALCIUM SERPL-MCNC: 8.8 MG/DL (ref 8.6–10.2)
CHLORIDE SERPL-SCNC: 96 MMOL/L (ref 98–107)
CO2 SERPL-SCNC: 27 MMOL/L (ref 22–29)
CREAT SERPL-MCNC: 4 MG/DL (ref 0.5–1)
EOSINOPHIL # BLD: 0.07 K/UL (ref 0.05–0.5)
EOSINOPHILS RELATIVE PERCENT: 3 % (ref 0–6)
ERYTHROCYTE [DISTWIDTH] IN BLOOD BY AUTOMATED COUNT: 15.9 % (ref 11.5–15)
GFR SERPL CREATININE-BSD FRML MDRD: 12 ML/MIN/1.73M2
GLUCOSE SERPL-MCNC: 79 MG/DL (ref 74–99)
HCT VFR BLD AUTO: 29.8 % (ref 34–48)
HGB BLD-MCNC: 9.9 G/DL (ref 11.5–15.5)
LYMPHOCYTES NFR BLD: 0.56 K/UL (ref 1.5–4)
LYMPHOCYTES RELATIVE PERCENT: 20 % (ref 20–42)
MCH RBC QN AUTO: 35.1 PG (ref 26–35)
MCHC RBC AUTO-ENTMCNC: 33.2 G/DL (ref 32–34.5)
MCV RBC AUTO: 105.7 FL (ref 80–99.9)
MONOCYTES NFR BLD: 0.17 K/UL (ref 0.1–0.95)
MONOCYTES NFR BLD: 6 % (ref 2–12)
NEUTROPHILS NFR BLD: 71 % (ref 43–80)
NEUTS SEG NFR BLD: 2 K/UL (ref 1.8–7.3)
PLATELET # BLD AUTO: 91 K/UL (ref 130–450)
PLATELET CONFIRMATION: NORMAL
PMV BLD AUTO: 10.4 FL (ref 7–12)
POTASSIUM SERPL-SCNC: 5.3 MMOL/L (ref 3.5–5)
PROT SERPL-MCNC: 6.9 G/DL (ref 6.4–8.3)
RBC # BLD AUTO: 2.82 M/UL (ref 3.5–5.5)
RBC # BLD: ABNORMAL 10*6/UL
RBC # BLD: ABNORMAL 10*6/UL
SODIUM SERPL-SCNC: 133 MMOL/L (ref 132–146)
WBC OTHER # BLD: 2.8 K/UL (ref 4.5–11.5)

## 2023-10-28 PROCEDURE — 2580000003 HC RX 258: Performed by: INTERNAL MEDICINE

## 2023-10-28 PROCEDURE — 6370000000 HC RX 637 (ALT 250 FOR IP): Performed by: INTERNAL MEDICINE

## 2023-10-28 PROCEDURE — 85025 COMPLETE CBC W/AUTO DIFF WBC: CPT

## 2023-10-28 PROCEDURE — 36415 COLL VENOUS BLD VENIPUNCTURE: CPT

## 2023-10-28 PROCEDURE — 6360000002 HC RX W HCPCS: Performed by: INTERNAL MEDICINE

## 2023-10-28 PROCEDURE — 6370000000 HC RX 637 (ALT 250 FOR IP): Performed by: PHYSICIAN ASSISTANT

## 2023-10-28 PROCEDURE — 99233 SBSQ HOSP IP/OBS HIGH 50: CPT | Performed by: INTERNAL MEDICINE

## 2023-10-28 PROCEDURE — 1200000000 HC SEMI PRIVATE

## 2023-10-28 PROCEDURE — 80053 COMPREHEN METABOLIC PANEL: CPT

## 2023-10-28 PROCEDURE — 94640 AIRWAY INHALATION TREATMENT: CPT

## 2023-10-28 PROCEDURE — 2700000000 HC OXYGEN THERAPY PER DAY

## 2023-10-28 PROCEDURE — 6370000000 HC RX 637 (ALT 250 FOR IP)

## 2023-10-28 RX ORDER — DOXYCYCLINE HYCLATE 100 MG/1
100 CAPSULE ORAL EVERY 12 HOURS SCHEDULED
Status: DISCONTINUED | OUTPATIENT
Start: 2023-10-28 | End: 2023-10-30 | Stop reason: HOSPADM

## 2023-10-28 RX ADMIN — ARFORMOTEROL TARTRATE 15 MCG: 15 SOLUTION RESPIRATORY (INHALATION) at 17:33

## 2023-10-28 RX ADMIN — CITALOPRAM HYDROBROMIDE 40 MG: 10 TABLET ORAL at 12:30

## 2023-10-28 RX ADMIN — PRIMIDONE 50 MG: 50 TABLET ORAL at 08:57

## 2023-10-28 RX ADMIN — BUDESONIDE INHALATION 500 MCG: 0.5 SUSPENSION RESPIRATORY (INHALATION) at 06:08

## 2023-10-28 RX ADMIN — ARFORMOTEROL TARTRATE 15 MCG: 15 SOLUTION RESPIRATORY (INHALATION) at 06:08

## 2023-10-28 RX ADMIN — SEVELAMER CARBONATE 800 MG: 800 TABLET, FILM COATED ORAL at 08:56

## 2023-10-28 RX ADMIN — Medication 10 ML: at 20:50

## 2023-10-28 RX ADMIN — Medication 5 MG: at 20:50

## 2023-10-28 RX ADMIN — TACROLIMUS 3 MG: 1 CAPSULE ORAL at 08:55

## 2023-10-28 RX ADMIN — HEPARIN SODIUM 5000 UNITS: 5000 INJECTION INTRAVENOUS; SUBCUTANEOUS at 16:19

## 2023-10-28 RX ADMIN — Medication 10 ML: at 09:05

## 2023-10-28 RX ADMIN — TACROLIMUS 3 MG: 1 CAPSULE ORAL at 20:49

## 2023-10-28 RX ADMIN — PANTOPRAZOLE SODIUM 40 MG: 40 TABLET, DELAYED RELEASE ORAL at 12:30

## 2023-10-28 RX ADMIN — ASPIRIN 81 MG CHEWABLE TABLET 81 MG: 81 TABLET CHEWABLE at 08:55

## 2023-10-28 RX ADMIN — SEVELAMER CARBONATE 800 MG: 800 TABLET, FILM COATED ORAL at 16:19

## 2023-10-28 RX ADMIN — ROPINIROLE HYDROCHLORIDE 1 MG: 1 TABLET, FILM COATED ORAL at 20:49

## 2023-10-28 RX ADMIN — ROPINIROLE HYDROCHLORIDE 1 MG: 1 TABLET, FILM COATED ORAL at 08:56

## 2023-10-28 RX ADMIN — HEPARIN SODIUM 5000 UNITS: 5000 INJECTION INTRAVENOUS; SUBCUTANEOUS at 06:34

## 2023-10-28 RX ADMIN — MIDODRINE HYDROCHLORIDE 10 MG: 5 TABLET ORAL at 16:19

## 2023-10-28 RX ADMIN — SEVELAMER CARBONATE 800 MG: 800 TABLET, FILM COATED ORAL at 12:30

## 2023-10-28 RX ADMIN — HEPARIN SODIUM 5000 UNITS: 5000 INJECTION INTRAVENOUS; SUBCUTANEOUS at 20:50

## 2023-10-28 RX ADMIN — DOXYCYCLINE HYCLATE 100 MG: 100 CAPSULE ORAL at 20:49

## 2023-10-28 RX ADMIN — BUDESONIDE INHALATION 500 MCG: 0.5 SUSPENSION RESPIRATORY (INHALATION) at 17:33

## 2023-10-28 RX ADMIN — MIDODRINE HYDROCHLORIDE 10 MG: 5 TABLET ORAL at 12:30

## 2023-10-28 RX ADMIN — MIDODRINE HYDROCHLORIDE 10 MG: 5 TABLET ORAL at 08:56

## 2023-10-28 RX ADMIN — WATER 1000 MG: 1 INJECTION INTRAMUSCULAR; INTRAVENOUS; SUBCUTANEOUS at 12:31

## 2023-10-28 NOTE — PROGRESS NOTES
OXYGEN Inhale 3 L/min into the lungs continuous    Davi Llamas MD   entecavir (BARACLUDE) 0.5 MG tablet Take 1 tablet by mouth every other day Last Dose: 2023  Next Dose: 2023    Davi Llamas MD   senna (SENOKOT) 8.6 MG TABS tablet Take 2 tablets by mouth daily  Patient not taking: Reported on 10/25/2023    Davi Llamas MD   tacrolimus (PROGRAF) 1 MG capsule Take 3 capsules by mouth 2 times daily    Davi Llamas MD   citalopram (CELEXA) 40 MG tablet Take 1 tablet by mouth Daily with lunch    Davi Llamas MD   pantoprazole (PROTONIX) 40 MG tablet Take 1 tablet by mouth Daily with lunch    Davi Llamas MD       ALLERGIES:  [place] tape]    SOCIAL Hx:  Social History     Socioeconomic History    Marital status:      Spouse name: Not on file    Number of children: Not on file    Years of education: Not on file    Highest education level: Not on file   Occupational History    Not on file   Tobacco Use    Smoking status: Former     Packs/day: .5     Types: Cigarettes     Quit date: 2019     Years since quittin.7    Smokeless tobacco: Never   Vaping Use    Vaping Use: Never used   Substance and Sexual Activity    Alcohol use: No    Drug use: Never    Sexual activity: Not on file   Other Topics Concern    Not on file   Social History Narrative    Not on file     Social Determinants of Health     Financial Resource Strain: Not on file   Food Insecurity: Not on file   Transportation Needs: Not on file   Physical Activity: Not on file   Stress: Not on file   Social Connections: Not on file   Intimate Partner Violence: Not on file   Housing Stability: Not on file       Review of systems:  Constitutional:   Positive for fatigue and malaise , - fever/chills  HEENT:   Denies ear pain, sore throat, sinus or eye problems. Cardiovascular:   Denies any chest pain, irregular heartbeats, or palpitations.    Respiratory:   - dyspnea at rest, - on June 6, 2023 cardiac catheterization  History of COPD  Acute on chronic hypoxic respiratory failure-normally on 3 L nasal cannula and was on 6 L nasal cannula after admission  Essential hypertension  History of liver transplant 2015  Hyperlipidemia  Chronic macrocytic anemia with thrombocytopenia  Obesity with BMI 34.91 kg meter squared  Depression/anxiety  Chronic constipation  Intermittent second-degree heart block    PLAN:  Ketty Herron is a 62-year-old female who presented with near syncope in PCPs office    Home medication reviewed  Admit to monitored bed  Blood pressures minus standing  CT of the chest without contrast  IV Lexiscan stress test before patient is discharged  Procalcitonin in a.m. DuoNeb aerosols every 4 hours as needed  Pulmicort aerosol twice daily  Brovana aerosol twice daily    Consult cardiology  Compazine 10 mg IV push every 6 hours as needed for nausea    Rocephin 1 g IV daily-elevated procalcitonin  Doxycycline 1 mg twice daily      BMP, CBC in a.m.       Aishwarya Louis DO  10:45 AM  10/28/2023

## 2023-10-28 NOTE — PROGRESS NOTES
INPATIENT CARDIOLOGY FOLLOW-UP    Name: Johana Medina    Age: 63 y.o. Date of Admission: 10/24/2023  4:10 PM    Date of Service: 10/28/2023    Primary Cardiologist: [facility] cardiology, Dr. Sangeeta Rae    Chief Complaint: Follow-up for ADHF, syncope    Interim History:  No new overnight cardiac complaints. Currently with no complaints of CP, SOB, palpitations, dizziness, or lightheadedness. SR on telemetry. No significant arrhythmia seen    Doing a lot better overall. Pressures have improved.     Review of Systems:   Negative except as described above    Problem List:  Patient Active Problem List   Diagnosis    Encounter regarding vascular access for dialysis for ESRD ([address])    Stage 5 chronic kidney disease on chronic dialysis ([address])    Chest pain    Drug-induced tremor    Orthostatic hypotension    Syncope and collapse    Hyperkalemia    Severe pulmonary hypertension (HCC)    Severe obesity (BMI 35.0-35.9 with comorbidity) ([address])    Acute on chronic heart failure with preserved ejection fraction (HCC)    VHD (valvular heart disease)       Current Medications:    Current Facility-Administered Medications:     prochlorperazine (COMPAZINE) injection 10 mg, 10 mg, IntraVENous, Q6H PRN, Billy Rose, Tu A, DO, 10 mg at 10/27/23 1414    sevelamer (RENVELA) tablet 800 mg, 800 mg, Oral, TID Jarocho APRN - CNP, 800 mg at 10/28/23 0856    midodrine (PROAMATINE) tablet 10 mg, 10 mg, Oral, TID Awilda [place] PA-C, 10 mg at 10/28/23 0856    ipratropium 0.5 mg-albuterol 2.5 mg (DUONEB) nebulizer solution 1 Dose, 1 Dose, Inhalation, Q4H PRN, Billy Rose, Tu A, DO, 1 Dose at 10/26/23 0530    glucose chewable tablet 16 g, 4 tablet, Oral, PRN, Baldo, Ismail U, DO    dextrose bolus 10% 125 mL, 125 mL, IntraVENous, PRN **OR** dextrose bolus 10% 250 mL, 250 mL, IntraVENous, PRN, Baldo, Ismail U, DO    glucagon injection 1 mg, 1 mg, SubCUTAneous, PRN, Baldo, Ismail U, DO    dextrose 10 % infusion, , IntraVENous, (5.4%), SVE triplets (1.7%) on Event Monitor 9/2023 [facility]  Severe pulmonary hypertension on TTE 9/2023 [facility]  Moderate TR, mild AI on TTE 9/2023 [facility]  Nonobstructive CAD on cardiac catheterization 6/2023, medically managed  Chronically elevated troponin  First-degree AVB on EKG  History of HTN  HLD  Obesity, BMI 38  MAVERICK, on home CPAP  COPD with former tobacco smoker  Acute on chronic hypoxic respiratory failure, on 4-5 L NC  ESRD, on HD  History of hyperkalemia  Chronic anemia / pancytopenia  History of GI bleed/acute anemia requiring transfusions  GERD  WHITING cirrhosis s/p liver transplant in 2015. Follows at [facility] with stable graft function as of last evaluation. On tacrolimus  History of being hospitalized in 2020 for hernia repair with complicated postoperative course by respiratory failure/arts requiring ECMO --> has been on hemodialysis since that time  Reported history of CVA with no residual deficits  History of DVT 2014, treated with Coumadin  Osteoarthritis           RECOMMENDATIONS:  Recent TTE and Event Monitor at [facility] 9/2023 reviewed  EKG reviewed --there is evidence of conductive system disease. No evidence of high-grade AV block so far. Doing better on midodrine 10 mg TID. Avoid systemic hypotension given presence of severe pulmonary hypertension. Continue monitoring on telemetry. Continue other cardiac medications the same at this time  Volume management as per Nephrology. Doing better after 2 sessions of dialysis. Follow up closely with [facility] cardiologist regarding further recommendations  Aggressive risk factor modification  Rest as per primary service and other consultants  Overall prognosis remains guarded with evidence of significant multiorgan dysfunction. Apparently, patient is not a candidate for renal transplantation given chronic hypoxemic respiratory failure as well as severe pulmonary hypertension. She will need referral to pulmonology on an outpatient basis.   She may also benefit from a

## 2023-10-28 NOTE — PLAN OF CARE
Problem: Safety - Adult  Goal: Free from fall injury  10/28/2023 1042 by Eduardo Boucher RN  Outcome: Progressing  10/27/2023 2303 by Dasia Ring RN  Outcome: Progressing     Problem: Pain  Goal: Verbalizes/displays adequate comfort level or baseline comfort level  10/28/2023 1042 by Eduardo Boucher RN  Outcome: Progressing  10/27/2023 2303 by Dasia Ring RN  Outcome: Progressing

## 2023-10-28 NOTE — PROGRESS NOTES
[facility] MD Alverto Gutierrez, MD Shahla Nazario, MD Bryson Su, NORY Oden, GREG Espinal, NORY  Progress Note    10/28/2023    SUBJECTIVE:   10/26: Sitting up in bed. No acute distress. Does still have dyspnea and lightheadedness with exertion. Appetite is good. Oxygen down to 5 L today. 10/27 seen on HD tolerating treatment weak bp stable on o2/nc    10/28 seen in her room on o2/nc appear comfortable bp better   Cardiology note reviewed     PROBLEM LIST:    Principal Problem:    Syncope and collapse  Active Problems:    Stage 5 chronic kidney disease on chronic dialysis (HCC)    Hyperkalemia    Severe pulmonary hypertension (HCC)    Severe obesity (BMI 35.0-35.9 with comorbidity) ([address])    Acute on chronic heart failure with preserved ejection fraction (HCC)    VHD (valvular heart disease)  Resolved Problems:    * No resolved hospital problems. *         DIET:    ADULT DIET; Regular; Low Potassium (Less than 3000 mg/day);  Low Phosphorus (Less than 1000 mg)     MEDS (scheduled):    cefTRIAXone (ROCEPHIN) IV  1,000 mg IntraVENous Q24H    doxycycline hyclate  100 mg Oral 2 times per day    sevelamer  800 mg Oral TID WC    midodrine  10 mg Oral TID WC    sodium chloride flush  5-40 mL IntraVENous 2 times per day    aspirin  81 mg Oral Daily    citalopram  40 mg Oral Lunch    melatonin  5 mg Oral Nightly    pantoprazole  40 mg Oral Lunch    primidone  50 mg Oral Daily    rOPINIRole  1 mg Oral BID    senna  2 tablet Oral Daily    tacrolimus  3 mg Oral BID    sodium chloride  500 mL IntraVENous Once    heparin (porcine)  5,000 Units SubCUTAneous 3 times per day    arformoterol tartrate  15 mcg Nebulization BID RT    And    budesonide  0.5 mg Nebulization BID RT       MEDS (infusions):   dextrose      sodium chloride         MEDS (prn):  prochlorperazine, ipratropium 0.5 mg-albuterol 2.5 mg, glucose, dextrose bolus **OR** dextrose bolus, glucagon (rDNA),

## 2023-10-28 NOTE — PLAN OF CARE
Problem: Safety - Adult  Goal: Free from fall injury  10/27/2023 1007 by Rashad Dyer RN  Outcome: Progressing     Problem: Pain  Goal: Verbalizes/displays adequate comfort level or baseline comfort level  10/27/2023 2303 by Krystle Nicholas RN  Outcome: Progressing

## 2023-10-29 LAB
ALBUMIN SERPL-MCNC: 3.7 G/DL (ref 3.5–5.2)
ALP SERPL-CCNC: 47 U/L (ref 35–104)
ALT SERPL-CCNC: 13 U/L (ref 0–32)
ANION GAP SERPL CALCULATED.3IONS-SCNC: 13 MMOL/L (ref 7–16)
ANION GAP SERPL CALCULATED.3IONS-SCNC: 14 MMOL/L (ref 7–16)
ANION GAP SERPL CALCULATED.3IONS-SCNC: 16 MMOL/L (ref 7–16)
AST SERPL-CCNC: 26 U/L (ref 0–31)
BASOPHILS # BLD: 0.02 K/UL (ref 0–0.2)
BASOPHILS NFR BLD: 1 % (ref 0–2)
BILIRUB SERPL-MCNC: 0.7 MG/DL (ref 0–1.2)
BUN SERPL-MCNC: 37 MG/DL (ref 6–23)
BUN SERPL-MCNC: 45 MG/DL (ref 6–23)
BUN SERPL-MCNC: 48 MG/DL (ref 6–23)
CALCIUM SERPL-MCNC: 9 MG/DL (ref 8.6–10.2)
CALCIUM SERPL-MCNC: 9 MG/DL (ref 8.6–10.2)
CALCIUM SERPL-MCNC: 9.2 MG/DL (ref 8.6–10.2)
CHLORIDE SERPL-SCNC: 92 MMOL/L (ref 98–107)
CHLORIDE SERPL-SCNC: 93 MMOL/L (ref 98–107)
CHLORIDE SERPL-SCNC: 93 MMOL/L (ref 98–107)
CO2 SERPL-SCNC: 22 MMOL/L (ref 22–29)
CO2 SERPL-SCNC: 23 MMOL/L (ref 22–29)
CO2 SERPL-SCNC: 25 MMOL/L (ref 22–29)
CREAT SERPL-MCNC: 5.7 MG/DL (ref 0.5–1)
CREAT SERPL-MCNC: 6.6 MG/DL (ref 0.5–1)
CREAT SERPL-MCNC: 6.9 MG/DL (ref 0.5–1)
EOSINOPHIL # BLD: 0.07 K/UL (ref 0.05–0.5)
EOSINOPHILS RELATIVE PERCENT: 2 % (ref 0–6)
ERYTHROCYTE [DISTWIDTH] IN BLOOD BY AUTOMATED COUNT: 15.4 % (ref 11.5–15)
GFR SERPL CREATININE-BSD FRML MDRD: 6 ML/MIN/1.73M2
GFR SERPL CREATININE-BSD FRML MDRD: 7 ML/MIN/1.73M2
GFR SERPL CREATININE-BSD FRML MDRD: 8 ML/MIN/1.73M2
GLUCOSE BLD-MCNC: 132 MG/DL (ref 74–99)
GLUCOSE SERPL-MCNC: 127 MG/DL (ref 74–99)
GLUCOSE SERPL-MCNC: 74 MG/DL (ref 74–99)
GLUCOSE SERPL-MCNC: 75 MG/DL (ref 74–99)
HCT VFR BLD AUTO: 29.8 % (ref 34–48)
HGB BLD-MCNC: 9.9 G/DL (ref 11.5–15.5)
IMM GRANULOCYTES # BLD AUTO: <0.03 K/UL (ref 0–0.58)
IMM GRANULOCYTES NFR BLD: 0 % (ref 0–5)
LYMPHOCYTES NFR BLD: 0.85 K/UL (ref 1.5–4)
LYMPHOCYTES RELATIVE PERCENT: 27 % (ref 20–42)
MCH RBC QN AUTO: 35.7 PG (ref 26–35)
MCHC RBC AUTO-ENTMCNC: 33.2 G/DL (ref 32–34.5)
MCV RBC AUTO: 107.6 FL (ref 80–99.9)
MONOCYTES NFR BLD: 0.2 K/UL (ref 0.1–0.95)
MONOCYTES NFR BLD: 6 % (ref 2–12)
NEUTROPHILS NFR BLD: 64 % (ref 43–80)
NEUTS SEG NFR BLD: 2.05 K/UL (ref 1.8–7.3)
PLATELET CONFIRMATION: NORMAL
PLATELET, FLUORESCENCE: 72 K/UL (ref 130–450)
PMV BLD AUTO: 10.5 FL (ref 7–12)
POTASSIUM SERPL-SCNC: 5.2 MMOL/L (ref 3.5–5)
POTASSIUM SERPL-SCNC: 5.5 MMOL/L (ref 3.5–5)
POTASSIUM SERPL-SCNC: 5.9 MMOL/L (ref 3.5–5)
PROT SERPL-MCNC: 7 G/DL (ref 6.4–8.3)
RBC # BLD AUTO: 2.77 M/UL (ref 3.5–5.5)
SODIUM SERPL-SCNC: 130 MMOL/L (ref 132–146)
SODIUM SERPL-SCNC: 130 MMOL/L (ref 132–146)
SODIUM SERPL-SCNC: 131 MMOL/L (ref 132–146)
WBC OTHER # BLD: 3.2 K/UL (ref 4.5–11.5)

## 2023-10-29 PROCEDURE — 82962 GLUCOSE BLOOD TEST: CPT

## 2023-10-29 PROCEDURE — 2700000000 HC OXYGEN THERAPY PER DAY

## 2023-10-29 PROCEDURE — 80053 COMPREHEN METABOLIC PANEL: CPT

## 2023-10-29 PROCEDURE — 80048 BASIC METABOLIC PNL TOTAL CA: CPT

## 2023-10-29 PROCEDURE — 2580000003 HC RX 258: Performed by: INTERNAL MEDICINE

## 2023-10-29 PROCEDURE — 6370000000 HC RX 637 (ALT 250 FOR IP): Performed by: INTERNAL MEDICINE

## 2023-10-29 PROCEDURE — 36415 COLL VENOUS BLD VENIPUNCTURE: CPT

## 2023-10-29 PROCEDURE — 2500000003 HC RX 250 WO HCPCS: Performed by: INTERNAL MEDICINE

## 2023-10-29 PROCEDURE — 85025 COMPLETE CBC W/AUTO DIFF WBC: CPT

## 2023-10-29 PROCEDURE — 6370000000 HC RX 637 (ALT 250 FOR IP)

## 2023-10-29 PROCEDURE — 6360000002 HC RX W HCPCS: Performed by: INTERNAL MEDICINE

## 2023-10-29 PROCEDURE — 94640 AIRWAY INHALATION TREATMENT: CPT

## 2023-10-29 PROCEDURE — 6370000000 HC RX 637 (ALT 250 FOR IP): Performed by: PHYSICIAN ASSISTANT

## 2023-10-29 PROCEDURE — 1200000000 HC SEMI PRIVATE

## 2023-10-29 PROCEDURE — 97535 SELF CARE MNGMENT TRAINING: CPT | Performed by: OCCUPATIONAL THERAPIST

## 2023-10-29 PROCEDURE — 97530 THERAPEUTIC ACTIVITIES: CPT | Performed by: OCCUPATIONAL THERAPIST

## 2023-10-29 RX ORDER — DEXTROSE MONOHYDRATE 25 G/50ML
25 INJECTION, SOLUTION INTRAVENOUS ONCE
Status: COMPLETED | OUTPATIENT
Start: 2023-10-29 | End: 2023-10-29

## 2023-10-29 RX ORDER — MIDODRINE HYDROCHLORIDE 5 MG/1
5 TABLET ORAL
Qty: 90 TABLET | Refills: 3 | Status: SHIPPED | OUTPATIENT
Start: 2023-10-29

## 2023-10-29 RX ORDER — DOXYCYCLINE HYCLATE 100 MG/1
100 CAPSULE ORAL EVERY 12 HOURS SCHEDULED
Qty: 12 CAPSULE | Refills: 0 | Status: SHIPPED | OUTPATIENT
Start: 2023-10-29 | End: 2023-11-04

## 2023-10-29 RX ORDER — SEVELAMER CARBONATE 800 MG/1
800 TABLET, FILM COATED ORAL
Qty: 90 TABLET | Refills: 3 | Status: SHIPPED | OUTPATIENT
Start: 2023-10-29

## 2023-10-29 RX ORDER — CEFDINIR 300 MG/1
300 CAPSULE ORAL DAILY
Qty: 7 CAPSULE | Refills: 0 | Status: SHIPPED | OUTPATIENT
Start: 2023-10-29 | End: 2023-11-05

## 2023-10-29 RX ADMIN — WATER 1000 MG: 1 INJECTION INTRAMUSCULAR; INTRAVENOUS; SUBCUTANEOUS at 12:18

## 2023-10-29 RX ADMIN — INSULIN HUMAN 8 UNITS: 100 INJECTION, SOLUTION PARENTERAL at 18:22

## 2023-10-29 RX ADMIN — DEXTROSE MONOHYDRATE 25 G: 25 INJECTION, SOLUTION INTRAVENOUS at 13:45

## 2023-10-29 RX ADMIN — CITALOPRAM HYDROBROMIDE 40 MG: 10 TABLET ORAL at 12:18

## 2023-10-29 RX ADMIN — Medication 10 ML: at 08:57

## 2023-10-29 RX ADMIN — TACROLIMUS 3 MG: 1 CAPSULE ORAL at 19:47

## 2023-10-29 RX ADMIN — ROPINIROLE HYDROCHLORIDE 1 MG: 1 TABLET, FILM COATED ORAL at 08:56

## 2023-10-29 RX ADMIN — HEPARIN SODIUM 5000 UNITS: 5000 INJECTION INTRAVENOUS; SUBCUTANEOUS at 20:00

## 2023-10-29 RX ADMIN — PRIMIDONE 50 MG: 50 TABLET ORAL at 08:56

## 2023-10-29 RX ADMIN — ASPIRIN 81 MG CHEWABLE TABLET 81 MG: 81 TABLET CHEWABLE at 08:56

## 2023-10-29 RX ADMIN — HEPARIN SODIUM 5000 UNITS: 5000 INJECTION INTRAVENOUS; SUBCUTANEOUS at 05:27

## 2023-10-29 RX ADMIN — MIDODRINE HYDROCHLORIDE 10 MG: 5 TABLET ORAL at 18:23

## 2023-10-29 RX ADMIN — BUDESONIDE INHALATION 500 MCG: 0.5 SUSPENSION RESPIRATORY (INHALATION) at 17:20

## 2023-10-29 RX ADMIN — INSULIN HUMAN 8 UNITS: 100 INJECTION, SOLUTION PARENTERAL at 13:53

## 2023-10-29 RX ADMIN — SEVELAMER CARBONATE 800 MG: 800 TABLET, FILM COATED ORAL at 18:23

## 2023-10-29 RX ADMIN — ACETAMINOPHEN 650 MG: 325 TABLET ORAL at 19:59

## 2023-10-29 RX ADMIN — BUDESONIDE INHALATION 500 MCG: 0.5 SUSPENSION RESPIRATORY (INHALATION) at 06:45

## 2023-10-29 RX ADMIN — Medication 5 MG: at 19:46

## 2023-10-29 RX ADMIN — ARFORMOTEROL TARTRATE 15 MCG: 15 SOLUTION RESPIRATORY (INHALATION) at 17:20

## 2023-10-29 RX ADMIN — PANTOPRAZOLE SODIUM 40 MG: 40 TABLET, DELAYED RELEASE ORAL at 12:18

## 2023-10-29 RX ADMIN — TACROLIMUS 3 MG: 1 CAPSULE ORAL at 08:56

## 2023-10-29 RX ADMIN — ARFORMOTEROL TARTRATE 15 MCG: 15 SOLUTION RESPIRATORY (INHALATION) at 06:45

## 2023-10-29 RX ADMIN — DEXTROSE MONOHYDRATE 25 G: 25 INJECTION, SOLUTION INTRAVENOUS at 18:23

## 2023-10-29 RX ADMIN — ROPINIROLE HYDROCHLORIDE 1 MG: 1 TABLET, FILM COATED ORAL at 19:46

## 2023-10-29 RX ADMIN — MIDODRINE HYDROCHLORIDE 10 MG: 5 TABLET ORAL at 08:56

## 2023-10-29 RX ADMIN — DOXYCYCLINE HYCLATE 100 MG: 100 CAPSULE ORAL at 08:56

## 2023-10-29 RX ADMIN — MIDODRINE HYDROCHLORIDE 10 MG: 5 TABLET ORAL at 12:18

## 2023-10-29 RX ADMIN — DOXYCYCLINE HYCLATE 100 MG: 100 CAPSULE ORAL at 19:46

## 2023-10-29 RX ADMIN — SEVELAMER CARBONATE 800 MG: 800 TABLET, FILM COATED ORAL at 12:17

## 2023-10-29 RX ADMIN — SODIUM ZIRCONIUM CYCLOSILICATE 10 G: 5 POWDER, FOR SUSPENSION ORAL at 13:59

## 2023-10-29 RX ADMIN — Medication 10 ML: at 19:47

## 2023-10-29 RX ADMIN — SEVELAMER CARBONATE 800 MG: 800 TABLET, FILM COATED ORAL at 08:56

## 2023-10-29 ASSESSMENT — PAIN SCALES - GENERAL: PAINLEVEL_OUTOF10: 6

## 2023-10-29 ASSESSMENT — PAIN DESCRIPTION - LOCATION: LOCATION: GENERALIZED

## 2023-10-29 NOTE — PROGRESS NOTES
[facility] MD Mario Brito MD Alvia Kays, MD Eudora Cabal, NORY Oden, GREG Pollard, NORY  Progress Note    10/29/2023    SUBJECTIVE:   10/26: Sitting up in bed. No acute distress. Does still have dyspnea and lightheadedness with exertion. Appetite is good. Oxygen down to 5 L today. 10/27 seen on HD tolerating treatment weak bp stable on o2/nc    10/28 seen in her room on o2/nc appear comfortable bp better   Cardiology note reviewed     10/29 K at 5.5 orders given for K cocktail and repeat K at 6 PM     PROBLEM LIST:    Principal Problem:    Syncope and collapse  Active Problems:    Stage 5 chronic kidney disease on chronic dialysis (HCC)    Hyperkalemia    Severe pulmonary hypertension (HCC)    Severe obesity (BMI 35.0-35.9 with comorbidity) ([address])    Acute on chronic heart failure with preserved ejection fraction (HCC)    VHD (valvular heart disease)  Resolved Problems:    * No resolved hospital problems. *         DIET:    ADULT DIET; Regular; Low Potassium (Less than 3000 mg/day);  Low Phosphorus (Less than 1000 mg)     MEDS (scheduled):    cefTRIAXone (ROCEPHIN) IV  1,000 mg IntraVENous Q24H    doxycycline hyclate  100 mg Oral 2 times per day    sevelamer  800 mg Oral TID WC    midodrine  10 mg Oral TID WC    sodium chloride flush  5-40 mL IntraVENous 2 times per day    aspirin  81 mg Oral Daily    citalopram  40 mg Oral Lunch    melatonin  5 mg Oral Nightly    pantoprazole  40 mg Oral Lunch    primidone  50 mg Oral Daily    rOPINIRole  1 mg Oral BID    senna  2 tablet Oral Daily    tacrolimus  3 mg Oral BID    sodium chloride  500 mL IntraVENous Once    heparin (porcine)  5,000 Units SubCUTAneous 3 times per day    arformoterol tartrate  15 mcg Nebulization BID RT    And    budesonide  0.5 mg Nebulization BID RT       MEDS (infusions):   dextrose      sodium chloride         MEDS (prn):  prochlorperazine, ipratropium 0.5 mg-albuterol 2.5 mg,

## 2023-10-29 NOTE — PLAN OF CARE
Problem: Safety - Adult  Goal: Free from fall injury  10/28/2023 2325 by Farhad Antonio RN  Outcome: Progressing     Problem: Pain  Goal: Verbalizes/displays adequate comfort level or baseline comfort level  10/28/2023 2325 by Farhad Antonio RN  Outcome: Progressing

## 2023-10-29 NOTE — PROGRESS NOTES
10/29/2023 07:57 AM    LABALBU 3.7 10/29/2023 07:57 AM    CALCIUM 9.0 10/29/2023 07:57 AM    BILITOT 0.7 10/29/2023 07:57 AM    ALKPHOS 47 10/29/2023 07:57 AM    AST 26 10/29/2023 07:57 AM    ALT 13 10/29/2023 07:57 AM     No results for input(s): \"TROPHS\" in the last 72 hours. ASSESSMENT:    -Near syncope in PCPs office  History of orthostatic hypotension, multifactorial  Chest pain  End-stage renal disease on hemodialysis  Elevated troponin with mild nonobstructive coronary artery disease noted on June 6, 2023 cardiac catheterization  History of COPD  History of MAVERICK on home CPAP  Acute on chronic hypoxic respiratory failure-normally on 3 L nasal cannula and was on 6 L nasal cannula after admission  Essential hypertension  History of liver transplant 2015  Hyperlipidemia  Chronic macrocytic anemia with thrombocytopenia  Obesity with BMI 34.91 kg meter squared  Depression/anxiety  Chronic constipation  Severe pulmonary hypertension-[facility]LO September 20 23 [facility]  Conductive system disease-no evidence of high-grade AV block    PLAN:  Lawrence Noland is a 82-year-old female who presented with near syncope in PCPs office    Home medication reviewed  Admit to monitored bed  Blood pressures minus standing  CT of the chest without contrast  IV Lexiscan stress test before patient is discharged  Procalcitonin in a.m. DuoNeb aerosols every 4 hours as needed  Pulmicort aerosol twice daily  Brovana aerosol twice daily    Consult cardiology  Compazine 10 mg IV push every 6 hours as needed for nausea    Rocephin 1 g IV daily-elevated procalcitonin  Doxycycline 1 mg twice daily    Discharge home okay with neurology      BMP, CBC in a.m.       Stone Nguyen DO  11:06 AM  10/29/2023

## 2023-10-29 NOTE — PROGRESS NOTES
OCCUPATIONAL THERAPY Treatment note  Critical access hospital [address] [facility]  [address]    Date:10/29/2023                                                   Patient Name: Jairo Moran     MRN: [id number]     : 1960     Room: [address] [place] [address]     EVAL Evaluating OT: Marv Avila OTR/L; [id number]      Referring Provider and Specific Provider Orders/Date:      10/25/23 0630   OT eval and treat  Start:  10/25/23 0630,   End:  10/25/23 0630,   ONE TIME,   Standing Count:  1 Occurrences,   R         Robyn Barnhart U, DO       Placement Recommendation: home with [address] [facility]       Diagnosis:   1. Hyperkalemia    2. Nausea and vomiting, unspecified vomiting type    3. Near syncope    4.  Stage 5 chronic kidney disease on chronic dialysis [facility])         Surgery: none        Pertinent Medical History:       Past Medical History        Past Medical History:   Diagnosis Date    Arthritis      Cirrhosis of liver ([address])      Depression      Eye abnormalities       blood behid retina    Hernia of abdominal wall      Hypertension      Neuropathy              Past Surgical History         Past Surgical History:   Procedure Laterality Date    CARDIAC CATHETERIZATION   2023     [place]    CHOLECYSTECTOMY        EYE SURGERY         Bilateral eye \"old blood taken out about 2 yes ago 2019\"    FRACTURE SURGERY        HYSTERECTOMY (CERVIX STATUS UNKNOWN)        LIVER TRANSPLANT       SHOULDER SURGERY Right      SHUNT REVISION Left 2021     AV GRAFT LEFT ARM performed by Estefania Gracia MD [facility]              Precautions:  Fall Risk, 3L O2, dialysis, COPD, monitor O2 saturation       Assessment of current deficits:     [x] Functional mobility            [x]ADLs           [x] Strength                   []Cognition    [x] Functional transfers          [x] IADLs          [] Safety Awareness   [x]Endurance    [] Fine Coordination              [x] Balance      [] barriers reflect that of functional transfers, functional mobility, UB/LB ADLs, activity tolerance, balance, safety and strengthening. At end of session, patient sitting up in arm chair with call light and phone within reach, all lines and tubes intact. Overall patient demonstrated decreased independence and safety during completion of ADL/functional transfer/mobility tasks. Nursing updated on pt position and status following OT treatment. Pt would benefit from continued skilled OT to increase safety and independence with completion of ADL/IADL tasks for functional independence and quality of life. Treatment: OT treatment provided this date includes:  Instruction, education and training on safe facilitation and adapted techniques for completion of ADLs. These include neuromuscular reeducation to facilitate balance/righting reactions,safe functional transfer techniques, proper positioning/alignment to improve interaction with environment and overall function and on adapted techniques/work simplification for completion of ADLs. Education provided on hand/feet placement with bed rails, arm chair, toilet, rollator and body mechanics for fall prevention. Cues for energy conservation and safety for in the home at [place], including modifications and DME. Extended time to complete all tasks, including skilled monitoring of patient's response during treatment session and vital signs. Prior to and at the end of session, environmental modifications / line management completed for patients safety and efficiency of treatment session. See above for further details. Pt has made fair progress towards set goals    OT 1-3x/week for 5-7 days during hospitalization      Treatment Time also includes thorough review of current medical information, gathering information on past medical history/social history and prior level of function, informal observation of tasks, assessment of data and education on plan of care and goals.

## 2023-10-29 NOTE — PLAN OF CARE
Problem: Safety - Adult  Goal: Free from fall injury  10/29/2023 1100 by Rohit Reagan RN  Outcome: Progressing  10/28/2023 2325 by Marlin Salnias RN  Outcome: Progressing     Problem: Pain  Goal: Verbalizes/displays adequate comfort level or baseline comfort level  10/29/2023 1100 by Rohit Reagan RN  Outcome: Progressing  10/28/2023 2325 by Marlin Salinas RN  Outcome: Progressing

## 2023-10-30 VITALS
RESPIRATION RATE: 16 BRPM | BODY MASS INDEX: 38.55 KG/M2 | HEIGHT: 63 IN | WEIGHT: 217.59 LBS | DIASTOLIC BLOOD PRESSURE: 65 MMHG | HEART RATE: 79 BPM | TEMPERATURE: 98 F | SYSTOLIC BLOOD PRESSURE: 150 MMHG | OXYGEN SATURATION: 95 %

## 2023-10-30 LAB
ALBUMIN SERPL-MCNC: 3.4 G/DL (ref 3.5–5.2)
ALP SERPL-CCNC: 49 U/L (ref 35–104)
ALT SERPL-CCNC: 13 U/L (ref 0–32)
ANION GAP SERPL CALCULATED.3IONS-SCNC: 14 MMOL/L (ref 7–16)
AST SERPL-CCNC: 22 U/L (ref 0–31)
BASOPHILS # BLD: 0.12 K/UL (ref 0–0.2)
BASOPHILS NFR BLD: 4 % (ref 0–2)
BILIRUB SERPL-MCNC: 0.5 MG/DL (ref 0–1.2)
BUN SERPL-MCNC: 52 MG/DL (ref 6–23)
CALCIUM SERPL-MCNC: 8.9 MG/DL (ref 8.6–10.2)
CHLORIDE SERPL-SCNC: 92 MMOL/L (ref 98–107)
CO2 SERPL-SCNC: 23 MMOL/L (ref 22–29)
CREAT SERPL-MCNC: 7.7 MG/DL (ref 0.5–1)
EOSINOPHIL # BLD: 0.06 K/UL (ref 0.05–0.5)
EOSINOPHILS RELATIVE PERCENT: 2 % (ref 0–6)
ERYTHROCYTE [DISTWIDTH] IN BLOOD BY AUTOMATED COUNT: 15.4 % (ref 11.5–15)
GFR SERPL CREATININE-BSD FRML MDRD: 6 ML/MIN/1.73M2
GLUCOSE SERPL-MCNC: 142 MG/DL (ref 74–99)
HCT VFR BLD AUTO: 27.5 % (ref 34–48)
HGB BLD-MCNC: 9.1 G/DL (ref 11.5–15.5)
LYMPHOCYTES NFR BLD: 0.74 K/UL (ref 1.5–4)
LYMPHOCYTES RELATIVE PERCENT: 22 % (ref 20–42)
MCH RBC QN AUTO: 35.4 PG (ref 26–35)
MCHC RBC AUTO-ENTMCNC: 33.1 G/DL (ref 32–34.5)
MCV RBC AUTO: 107 FL (ref 80–99.9)
MONOCYTES NFR BLD: 0.18 K/UL (ref 0.1–0.95)
MONOCYTES NFR BLD: 5 % (ref 2–12)
NEUTROPHILS NFR BLD: 68 % (ref 43–80)
NEUTS SEG NFR BLD: 2.31 K/UL (ref 1.8–7.3)
NUCLEATED RED BLOOD CELLS: 1 PER 100 WBC
PLATELET # BLD AUTO: 91 K/UL (ref 130–450)
PLATELET CONFIRMATION: NORMAL
PMV BLD AUTO: 10.1 FL (ref 7–12)
POTASSIUM SERPL-SCNC: 4.6 MMOL/L (ref 3.5–5)
PROT SERPL-MCNC: 6.5 G/DL (ref 6.4–8.3)
RBC # BLD AUTO: 2.57 M/UL (ref 3.5–5.5)
RBC # BLD: ABNORMAL 10*6/UL
SODIUM SERPL-SCNC: 129 MMOL/L (ref 132–146)
WBC OTHER # BLD: 3.4 K/UL (ref 4.5–11.5)

## 2023-10-30 PROCEDURE — 6360000002 HC RX W HCPCS: Performed by: INTERNAL MEDICINE

## 2023-10-30 PROCEDURE — 80053 COMPREHEN METABOLIC PANEL: CPT

## 2023-10-30 PROCEDURE — 6370000000 HC RX 637 (ALT 250 FOR IP): Performed by: INTERNAL MEDICINE

## 2023-10-30 PROCEDURE — 2580000003 HC RX 258: Performed by: INTERNAL MEDICINE

## 2023-10-30 PROCEDURE — 90935 HEMODIALYSIS ONE EVALUATION: CPT

## 2023-10-30 PROCEDURE — 36415 COLL VENOUS BLD VENIPUNCTURE: CPT

## 2023-10-30 PROCEDURE — 2700000000 HC OXYGEN THERAPY PER DAY

## 2023-10-30 PROCEDURE — 94640 AIRWAY INHALATION TREATMENT: CPT

## 2023-10-30 PROCEDURE — 85025 COMPLETE CBC W/AUTO DIFF WBC: CPT

## 2023-10-30 PROCEDURE — 6370000000 HC RX 637 (ALT 250 FOR IP): Performed by: PHYSICIAN ASSISTANT

## 2023-10-30 RX ADMIN — PRIMIDONE 50 MG: 50 TABLET ORAL at 16:06

## 2023-10-30 RX ADMIN — CITALOPRAM HYDROBROMIDE 40 MG: 10 TABLET ORAL at 16:09

## 2023-10-30 RX ADMIN — ARFORMOTEROL TARTRATE 15 MCG: 15 SOLUTION RESPIRATORY (INHALATION) at 05:43

## 2023-10-30 RX ADMIN — MIDODRINE HYDROCHLORIDE 10 MG: 5 TABLET ORAL at 08:35

## 2023-10-30 RX ADMIN — IPRATROPIUM BROMIDE AND ALBUTEROL SULFATE 1 DOSE: .5; 2.5 SOLUTION RESPIRATORY (INHALATION) at 05:42

## 2023-10-30 RX ADMIN — ACETAMINOPHEN 650 MG: 325 TABLET ORAL at 00:51

## 2023-10-30 RX ADMIN — ARFORMOTEROL TARTRATE 15 MCG: 15 SOLUTION RESPIRATORY (INHALATION) at 16:04

## 2023-10-30 RX ADMIN — BUDESONIDE INHALATION 500 MCG: 0.5 SUSPENSION RESPIRATORY (INHALATION) at 16:04

## 2023-10-30 RX ADMIN — HEPARIN SODIUM 5000 UNITS: 5000 INJECTION INTRAVENOUS; SUBCUTANEOUS at 06:29

## 2023-10-30 RX ADMIN — PROCHLORPERAZINE EDISYLATE 10 MG: 5 INJECTION INTRAMUSCULAR; INTRAVENOUS at 00:51

## 2023-10-30 RX ADMIN — Medication 10 ML: at 08:35

## 2023-10-30 RX ADMIN — DOXYCYCLINE HYCLATE 100 MG: 100 CAPSULE ORAL at 16:13

## 2023-10-30 RX ADMIN — BUDESONIDE INHALATION 500 MCG: 0.5 SUSPENSION RESPIRATORY (INHALATION) at 05:43

## 2023-10-30 RX ADMIN — ROPINIROLE HYDROCHLORIDE 1 MG: 1 TABLET, FILM COATED ORAL at 16:10

## 2023-10-30 RX ADMIN — TACROLIMUS 3 MG: 1 CAPSULE ORAL at 16:11

## 2023-10-30 RX ADMIN — SENNOSIDES 17.2 MG: 8.6 TABLET, FILM COATED ORAL at 16:09

## 2023-10-30 RX ADMIN — ASPIRIN 81 MG CHEWABLE TABLET 81 MG: 81 TABLET CHEWABLE at 16:10

## 2023-10-30 RX ADMIN — HEPARIN SODIUM 5000 UNITS: 5000 INJECTION INTRAVENOUS; SUBCUTANEOUS at 16:07

## 2023-10-30 RX ADMIN — IPRATROPIUM BROMIDE AND ALBUTEROL SULFATE 1 DOSE: .5; 2.5 SOLUTION RESPIRATORY (INHALATION) at 16:04

## 2023-10-30 ASSESSMENT — PAIN SCALES - GENERAL
PAINLEVEL_OUTOF10: 7
PAINLEVEL_OUTOF10: 0

## 2023-10-30 ASSESSMENT — PAIN DESCRIPTION - LOCATION: LOCATION: ABDOMEN

## 2023-10-30 ASSESSMENT — PAIN DESCRIPTION - DESCRIPTORS: DESCRIPTORS: DULL;DISCOMFORT

## 2023-10-30 ASSESSMENT — PAIN DESCRIPTION - ORIENTATION: ORIENTATION: RIGHT

## 2023-10-30 ASSESSMENT — PAIN - FUNCTIONAL ASSESSMENT: PAIN_FUNCTIONAL_ASSESSMENT: ACTIVITIES ARE NOT PREVENTED

## 2023-10-30 ASSESSMENT — PAIN DESCRIPTION - FREQUENCY: FREQUENCY: CONTINUOUS

## 2023-10-30 ASSESSMENT — PAIN DESCRIPTION - ONSET: ONSET: ON-GOING

## 2023-10-30 ASSESSMENT — PAIN DESCRIPTION - PAIN TYPE: TYPE: ACUTE PAIN

## 2023-10-30 NOTE — PLAN OF CARE
Problem: Safety - Adult  Goal: Free from fall injury  10/30/2023 1924 by Zeinab Roberts RN  Outcome: Completed     Problem: Pain  Goal: Verbalizes/displays adequate comfort level or baseline comfort level  10/30/2023 1924 by Zeinab Roberts RN  Outcome: Completed     Problem: Chronic Conditions and Co-morbidities  Goal: Patient's chronic conditions and co-morbidity symptoms are monitored and maintained or improved  Outcome: Completed

## 2023-10-30 NOTE — PLAN OF CARE
Problem: Safety - Adult  Goal: Free from fall injury  10/29/2023 2342 by Yanira Kirby RN  Outcome: Progressing     Problem: Pain  Goal: Verbalizes/displays adequate comfort level or baseline comfort level  10/29/2023 2342 by Yanira Kirby RN  Outcome: Progressing

## 2023-10-30 NOTE — PROGRESS NOTES
Department of Internal Medicine      Primary Care Physician: Violette Cowden.,    Admitting Physician:  Vanessa Lu DO  Admission date and time: 10/24/2023  4:10 PM    Room:  [address] [place] [address]  Admitting diagnosis: Syncope and collapse [R55]  Hyperkalemia [E87.5]  Near syncope [R55]  Stage 5 chronic kidney disease on chronic dialysis ([address]) [N18.6, Z99.2]  Nausea and vomiting, unspecified vomiting type [R11.2]    Patient Name: Leanne Heath  MRN: [id number]    Date of Service: 10/30/2023     Chief Complaint:  Dizziness    HISTORY OF PRESENT ILLNESS:    Leanne Heath is a 82-year-old female patient who presented to [facility] for evaluation of having a near syncopal episode at her PCPs office yesterday. The patient went to see her PCP and they told her blood pressure was very high. Patient is on midodrine with a history of significant orthostatic hypotension during last admission with the patient being discharged October 4. Patient became very nauseated with the episode in the PCPs office. Patient denied problem with chest pain, palpitations or unusual shortness of breath. There is no fever/chills. Patient states that her O2 sat was in the 50s in her PCPs office. Patient's blood pressure in the ED initially was 127/59 with a heart rate of 64 and O2 saturation 92% on 3 L. Patient normally wears 3 L at home. Today temperature 97.5 with blood pressure 128/54. Heart rate ranges 50-72. O2 sat 95% on 6 L. Chest x-ray last night showed no acute process. CT of the abdomen yesterday showed no mechanical obstructive process with mild-moderate proximal colonic stool burden. Patient has splenomegaly and thought to have a trace or small right pleural effusion with some atelectasis. Procalcitonin today was 0.35    10/26/2023  Patient seen examined on telemetry floor. Patient is on 5 L nasal cannula with O2 sat 99%. Vital signs from last night are stable. Pending lab work this morning.   Patient feels little patient is discharged  Procalcitonin in a.m. DuoNeb aerosols every 4 hours as needed  Pulmicort aerosol twice daily  Brovana aerosol twice daily    Consult cardiology  Compazine 10 mg IV push every 6 hours as needed for nausea    Rocephin 1 g IV daily-elevated procalcitonin  Doxycycline 1 mg twice daily    Discharge home okay with nephrology      BMP, CBC in a.m.       Aishwarya Louis DO  11:49 AM  10/30/2023

## 2023-10-30 NOTE — CARE COORDINATION
10/26/2023 1226 CM note: Pt resides with SHEFALI Russ in a 2 floor townhouse. Bed/bath are on 2nd level with 13 steps and rail. DME includes rollator and  home [email or web address] [place] (portable/10Lconcentrator) from [facility]. She is currently on 5L NC(pox 99%). Pt attends outpt HD at [facility] at [address] [facility], [address] [facility] transports. She was recently discharged from [address] [facility] to start services. Pt plans to return home at d/c with [facility], friend will provide ride. CM will follow for possible increased home o2 needs(will need pox testing and Rx) and HHC orders.  Alan DAVID
10/27/2023 1045 CM note:  Pt resides with S.O. [place] in a 2 floor townhouse. Bed/bath are on 2nd level with 13 steps and rail. DME includes rollator and  home [email or web address] [place] (portable/10Lconcentrator) from [facility]. She is currently on 4L NC. Pt attends outpt HD at [facility] MWF at [address], [address] [place] a [place] transports. Pt plans to return home with S.O. and [facility], she declines [facility]. [facility] accepted pt and received orders. Pt's friend will provide ride.  Alan DAVID
10/30/2023 1047 CM note: Pt resides with S[facility]. [facility] in a 2 floor townhouse. Bed/bath are on 2nd level with 13 steps and rail. DME includes rollator and  home [email or web address] [place] (portable/10Lconcentrator) from [facility]. She is currently on 3L NC. Pt attends outpt HD at [facility] MWF at [address], [address] [place] a [facility] transports. Pt plans to return home with S.O. and [facility]. [facility] accepted pt and received orders. Pt's friend will provide ride.  Alan DAVID
at [address], [address] [place] a [facility] transports. She was recently discharged from [address] was to start services. Pt plans to return home at d/c with [facility], friend will provide ride. CM will follow for possible increased home o2 needs(will need pox testing and Rx) and HHC orders. Await PT/OT brenden Phillips RN    The Plan for Transition of Care is related to the following treatment goals of Syncope and collapse [R55]  Hyperkalemia [E87.5]  Near syncope [R55]  Stage 5 chronic kidney disease on chronic dialysis (HCC) [N18.6, Z99.2]  Nausea and vomiting, unspecified vomiting type [R11.2]      The Patient and/or Patient Representative Agree with the Discharge Plan?  yes    Mike Galindo RN  Case Management Department

## 2023-10-30 NOTE — PROGRESS NOTES
[facility] MD Ron Schmid MD Lilly Banker, MD Estanislado Shorten, NORY Oden, GREG Cloud, NORY  Progress Note    10/30/2023    SUBJECTIVE:   10/26: Sitting up in bed. No acute distress. Does still have dyspnea and lightheadedness with exertion. Appetite is good. Oxygen down to 5 L today. 10/27 seen on HD tolerating treatment weak bp stable on o2/nc    10/28 seen in her room on o2/nc appear comfortable bp better   Cardiology note reviewed     10/29 K at 5.5 orders given for K cocktail and repeat K at 6 PM     10/30 HD today no reported issues BP stable     PROBLEM LIST:    Principal Problem:    Syncope and collapse  Active Problems:    Stage 5 chronic kidney disease on chronic dialysis (HCC)    Hyperkalemia    Severe pulmonary hypertension (HCC)    Severe obesity (BMI 35.0-35.9 with comorbidity) ([address])    Acute on chronic heart failure with preserved ejection fraction (HCC)    VHD (valvular heart disease)  Resolved Problems:    * No resolved hospital problems. *         DIET:    ADULT DIET; Regular; Low Potassium (Less than 3000 mg/day);  Low Phosphorus (Less than 1000 mg)     MEDS (scheduled):    cefTRIAXone (ROCEPHIN) IV  1,000 mg IntraVENous Q24H    doxycycline hyclate  100 mg Oral 2 times per day    sevelamer  800 mg Oral TID WC    midodrine  10 mg Oral TID WC    sodium chloride flush  5-40 mL IntraVENous 2 times per day    aspirin  81 mg Oral Daily    citalopram  40 mg Oral Lunch    melatonin  5 mg Oral Nightly    pantoprazole  40 mg Oral Lunch    primidone  50 mg Oral Daily    rOPINIRole  1 mg Oral BID    senna  2 tablet Oral Daily    tacrolimus  3 mg Oral BID    sodium chloride  500 mL IntraVENous Once    heparin (porcine)  5,000 Units SubCUTAneous 3 times per day    arformoterol tartrate  15 mcg Nebulization BID RT    And    budesonide  0.5 mg Nebulization BID RT       MEDS (infusions):   dextrose      sodium chloride         MEDS

## 2023-10-30 NOTE — PROGRESS NOTES
Physical Therapy  Physical Therapy    Room #:   2659/0056-63    Date: 10/30/2023       Patient Name: Christofer Kelley  : 1960      MRN: [id number]     Patient unavailable for physical therapy treatment due to  patient is scheduled for discharge to home today . Isela Calle.  Melinda  [facility]  LIC # [id number]

## 2023-11-06 NOTE — PROGRESS NOTES
Physician Progress Note      PATIENT:               Filemon Carmichael  CSN #:                  [id number]  :                       1960  ADMIT DATE:       10/24/2023 4:10 PM  [address] [place] [address] DATE:        10/30/2023 5:51 PM  RESPONDING  PROVIDER #:        Radha Leon DO          QUERY TEXT:    Dear ,    Patient admitted with near syncope. noted to have orthostatic hypotension. If   possible, please document in progress notes and discharge summary after study   the etiology of the syncope: The medical record reflects the following:  Risk Factors: ESRD on HD, CAD, COPD, HTN, HLD, anemia, Obesity,   depression/anxiety, hx liver transplant, Severe pulmonary hypertension,   Conductive system disease  Clinical Indicators: per H&P: \" Patient is on midodrine with a history of   significant orthostatic hypotension during last admission with the patient   being discharged . Mango Jose Herrerao Jose History of orthostatic hypotension,   multifactorial\"; per Renal consult: \"Labile BP with Orthostatic hypotension\";   per Cardiology consult: \" Titrate Midodrine to 10 mg TID; EKG reviewed --there   is evidence of conductive system disease. No evidence of high-grade AV block   so far. \"  Treatment: telemetry monitoring, ortho bp, Titrate Midodrine to 10 mg TID,   Renal and Cardiology consult, keep legs elevated while sitting, compression   stockings, sleep 45 degree    Thank You,  Deyanira Burr RN, BSN, CDS  Clinical Documentation Improvement Specialist  Options provided:  -- Syncope due to orthostatic hypotension  -- Syncope due to other hypotension  -- Syncope not due to orthostatic hypotension  -- Other - I will add my own diagnosis  -- Disagree - Not applicable / Not valid  -- Disagree - Clinically unable to determine / Unknown  -- Refer to Clinical Documentation Reviewer    PROVIDER RESPONSE TEXT:    The syncope is due to orthostatic hypotension.     Query created by: Deyanira Burr on 2023 11:43 AM      Electronically signed by:

## 2023-11-18 ENCOUNTER — HOSPITAL ENCOUNTER (INPATIENT)
Age: 63
LOS: 10 days | Discharge: HOME | DRG: 189 | End: 2023-11-28
Attending: STUDENT IN AN ORGANIZED HEALTH CARE EDUCATION/TRAINING PROGRAM | Admitting: INTERNAL MEDICINE
Payer: MEDICARE

## 2023-11-18 ENCOUNTER — APPOINTMENT (OUTPATIENT)
Dept: GENERAL RADIOLOGY | Age: 63
DRG: 189 | End: 2023-11-18
Payer: MEDICARE

## 2023-11-18 ENCOUNTER — APPOINTMENT (OUTPATIENT)
Dept: ULTRASOUND IMAGING | Age: 63
DRG: 189 | End: 2023-11-18
Payer: MEDICARE

## 2023-11-18 ENCOUNTER — APPOINTMENT (OUTPATIENT)
Dept: CT IMAGING | Age: 63
DRG: 189 | End: 2023-11-18
Payer: MEDICARE

## 2023-11-18 DIAGNOSIS — J96.01 ACUTE RESPIRATORY FAILURE WITH HYPOXIA (HCC): Primary | ICD-10-CM

## 2023-11-18 DIAGNOSIS — J18.9 PNEUMONIA DUE TO INFECTIOUS ORGANISM, UNSPECIFIED LATERALITY, UNSPECIFIED PART OF LUNG: ICD-10-CM

## 2023-11-18 DIAGNOSIS — R52 BODY ACHES: ICD-10-CM

## 2023-11-18 DIAGNOSIS — R60.9 PERIPHERAL EDEMA: ICD-10-CM

## 2023-11-18 DIAGNOSIS — J44.1 COPD EXACERBATION (HCC): ICD-10-CM

## 2023-11-18 DIAGNOSIS — R07.9 CHEST PAIN, UNSPECIFIED TYPE: ICD-10-CM

## 2023-11-18 LAB
AADO2: 525.2 MMHG
ALBUMIN SERPL-MCNC: 3.7 G/DL (ref 3.5–5.2)
ALP SERPL-CCNC: 86 U/L (ref 35–104)
ALT SERPL-CCNC: 14 U/L (ref 0–32)
ANION GAP SERPL CALCULATED.3IONS-SCNC: 13 MMOL/L (ref 7–16)
AST SERPL-CCNC: 24 U/L (ref 0–31)
B.E.: 4.6 MMOL/L (ref -3–3)
BASOPHILS # BLD: 0 K/UL (ref 0–0.2)
BASOPHILS NFR BLD: 0 % (ref 0–2)
BILIRUB SERPL-MCNC: 0.7 MG/DL (ref 0–1.2)
BNP SERPL-MCNC: ABNORMAL PG/ML (ref 0–450)
BUN SERPL-MCNC: 32 MG/DL (ref 6–23)
CALCIUM SERPL-MCNC: 8.9 MG/DL (ref 8.6–10.2)
CHLORIDE SERPL-SCNC: 92 MMOL/L (ref 98–107)
CO2 SERPL-SCNC: 30 MMOL/L (ref 22–29)
COHB: 0.2 % (ref 0–1.5)
CREAT SERPL-MCNC: 6.1 MG/DL (ref 0.5–1)
CRITICAL: ABNORMAL
DATE ANALYZED: ABNORMAL
DATE OF COLLECTION: ABNORMAL
EOSINOPHIL # BLD: 0.05 K/UL (ref 0.05–0.5)
EOSINOPHILS RELATIVE PERCENT: 3 % (ref 0–6)
ERYTHROCYTE [DISTWIDTH] IN BLOOD BY AUTOMATED COUNT: 14.6 % (ref 11.5–15)
FIO2: 90 %
GFR SERPL CREATININE-BSD FRML MDRD: 7 ML/MIN/1.73M2
GLUCOSE SERPL-MCNC: 133 MG/DL (ref 74–99)
HCO3: 28.6 MMOL/L (ref 22–26)
HCT VFR BLD AUTO: 26.5 % (ref 34–48)
HGB BLD-MCNC: 8.8 G/DL (ref 11.5–15.5)
HHB: 13 % (ref 0–5)
INFLUENZA A BY PCR: NOT DETECTED
INFLUENZA B BY PCR: NOT DETECTED
LAB: ABNORMAL
LACTATE BLDV-SCNC: 1.5 MMOL/L (ref 0.5–1.9)
LYMPHOCYTES NFR BLD: 0.75 K/UL (ref 1.5–4)
LYMPHOCYTES RELATIVE PERCENT: 37 % (ref 20–42)
Lab: 2245
MAGNESIUM SERPL-MCNC: 2 MG/DL (ref 1.6–2.6)
MCH RBC QN AUTO: 35.5 PG (ref 26–35)
MCHC RBC AUTO-ENTMCNC: 33.2 G/DL (ref 32–34.5)
MCV RBC AUTO: 106.9 FL (ref 80–99.9)
METHB: 0.3 % (ref 0–1.5)
MODE: ABNORMAL
MONOCYTES NFR BLD: 0.03 K/UL (ref 0.1–0.95)
MONOCYTES NFR BLD: 2 % (ref 2–12)
MYELOCYTES ABSOLUTE COUNT: 0.08 K/UL
MYELOCYTES: 4 %
NEUTROPHILS NFR BLD: 54 % (ref 43–80)
NEUTS SEG NFR BLD: 1.08 K/UL (ref 1.8–7.3)
O2 CONTENT: 11.2 ML/DL
O2 SATURATION: 86.9 % (ref 92–98.5)
O2HB: 86.5 % (ref 94–97)
OPERATOR ID: 5523
PATIENT TEMP: 37 C
PCO2: 40.1 MMHG (ref 35–45)
PFO2: 0.59 MMHG/%
PH BLOOD GAS: 7.47 (ref 7.35–7.45)
PLATELET # BLD AUTO: 53 K/UL (ref 130–450)
PLATELET CONFIRMATION: NORMAL
PMV BLD AUTO: 10.2 FL (ref 7–12)
PO2: 52.9 MMHG (ref 75–100)
POTASSIUM SERPL-SCNC: 4.1 MMOL/L (ref 3.5–5)
PROT SERPL-MCNC: 7.5 G/DL (ref 6.4–8.3)
RBC # BLD AUTO: 2.48 M/UL (ref 3.5–5.5)
RBC # BLD: ABNORMAL 10*6/UL
RI(T): 9.93
SARS-COV-2 RDRP RESP QL NAA+PROBE: NOT DETECTED
SODIUM SERPL-SCNC: 135 MMOL/L (ref 132–146)
SOURCE, BLOOD GAS: ABNORMAL
SPECIMEN DESCRIPTION: NORMAL
THB: 9.2 G/DL (ref 11.5–16.5)
TIME ANALYZED: 2252
TROPONIN I SERPL HS-MCNC: 47 NG/L (ref 0–9)
TROPONIN I SERPL HS-MCNC: 54 NG/L (ref 0–9)
WBC OTHER # BLD: 2 K/UL (ref 4.5–11.5)

## 2023-11-18 PROCEDURE — 6360000002 HC RX W HCPCS

## 2023-11-18 PROCEDURE — 82805 BLOOD GASES W/O2 SATURATION: CPT

## 2023-11-18 PROCEDURE — 93005 ELECTROCARDIOGRAM TRACING: CPT

## 2023-11-18 PROCEDURE — 2700000000 HC OXYGEN THERAPY PER DAY

## 2023-11-18 PROCEDURE — 6370000000 HC RX 637 (ALT 250 FOR IP)

## 2023-11-18 PROCEDURE — 96374 THER/PROPH/DIAG INJ IV PUSH: CPT

## 2023-11-18 PROCEDURE — 99285 EMERGENCY DEPT VISIT HI MDM: CPT

## 2023-11-18 PROCEDURE — 71275 CT ANGIOGRAPHY CHEST: CPT

## 2023-11-18 PROCEDURE — 71045 X-RAY EXAM CHEST 1 VIEW: CPT

## 2023-11-18 PROCEDURE — 94640 AIRWAY INHALATION TREATMENT: CPT

## 2023-11-18 PROCEDURE — 6360000002 HC RX W HCPCS: Performed by: STUDENT IN AN ORGANIZED HEALTH CARE EDUCATION/TRAINING PROGRAM

## 2023-11-18 PROCEDURE — 93970 EXTREMITY STUDY: CPT

## 2023-11-18 PROCEDURE — 80053 COMPREHEN METABOLIC PANEL: CPT

## 2023-11-18 PROCEDURE — 2060000000 HC ICU INTERMEDIATE R&B

## 2023-11-18 PROCEDURE — 83735 ASSAY OF MAGNESIUM: CPT

## 2023-11-18 PROCEDURE — 87635 SARS-COV-2 COVID-19 AMP PRB: CPT

## 2023-11-18 PROCEDURE — 5A09457 ASSISTANCE WITH RESPIRATORY VENTILATION, 24-96 CONSECUTIVE HOURS, CONTINUOUS POSITIVE AIRWAY PRESSURE: ICD-10-PCS | Performed by: INTERNAL MEDICINE

## 2023-11-18 PROCEDURE — 83880 ASSAY OF NATRIURETIC PEPTIDE: CPT

## 2023-11-18 PROCEDURE — 96375 TX/PRO/DX INJ NEW DRUG ADDON: CPT

## 2023-11-18 PROCEDURE — 87502 INFLUENZA DNA AMP PROBE: CPT

## 2023-11-18 PROCEDURE — 87040 BLOOD CULTURE FOR BACTERIA: CPT

## 2023-11-18 PROCEDURE — 83605 ASSAY OF LACTIC ACID: CPT

## 2023-11-18 PROCEDURE — 85025 COMPLETE CBC W/AUTO DIFF WBC: CPT

## 2023-11-18 PROCEDURE — 6370000000 HC RX 637 (ALT 250 FOR IP): Performed by: INTERNAL MEDICINE

## 2023-11-18 PROCEDURE — 84484 ASSAY OF TROPONIN QUANT: CPT

## 2023-11-18 PROCEDURE — 6360000004 HC RX CONTRAST MEDICATION: Performed by: RADIOLOGY

## 2023-11-18 PROCEDURE — 94644 CONT INHLJ TX 1ST HOUR: CPT

## 2023-11-18 RX ORDER — HEPARIN SODIUM 5000 [USP'U]/ML
5000 INJECTION, SOLUTION INTRAVENOUS; SUBCUTANEOUS EVERY 8 HOURS SCHEDULED
Status: DISCONTINUED | OUTPATIENT
Start: 2023-11-18 | End: 2023-11-18 | Stop reason: ALTCHOICE

## 2023-11-18 RX ORDER — ARFORMOTEROL TARTRATE 15 UG/2ML
15 SOLUTION RESPIRATORY (INHALATION)
Status: DISCONTINUED | OUTPATIENT
Start: 2023-11-18 | End: 2023-11-19

## 2023-11-18 RX ORDER — IPRATROPIUM BROMIDE AND ALBUTEROL SULFATE 2.5; .5 MG/3ML; MG/3ML
3 SOLUTION RESPIRATORY (INHALATION) ONCE
Status: COMPLETED | OUTPATIENT
Start: 2023-11-18 | End: 2023-11-18

## 2023-11-18 RX ORDER — SODIUM CHLORIDE 9 MG/ML
INJECTION, SOLUTION INTRAVENOUS PRN
Status: DISCONTINUED | OUTPATIENT
Start: 2023-11-18 | End: 2023-11-28 | Stop reason: HOSPADM

## 2023-11-18 RX ORDER — ACETAMINOPHEN 650 MG/1
650 SUPPOSITORY RECTAL EVERY 6 HOURS PRN
Status: DISCONTINUED | OUTPATIENT
Start: 2023-11-18 | End: 2023-11-28 | Stop reason: HOSPADM

## 2023-11-18 RX ORDER — MECOBALAMIN 5000 MCG
5 TABLET,DISINTEGRATING ORAL NIGHTLY
Status: DISCONTINUED | OUTPATIENT
Start: 2023-11-18 | End: 2023-11-28 | Stop reason: HOSPADM

## 2023-11-18 RX ORDER — BUDESONIDE AND FORMOTEROL FUMARATE DIHYDRATE 160; 4.5 UG/1; UG/1
2 AEROSOL RESPIRATORY (INHALATION)
Status: DISCONTINUED | OUTPATIENT
Start: 2023-11-18 | End: 2023-11-18 | Stop reason: CLARIF

## 2023-11-18 RX ORDER — ACETAMINOPHEN 500 MG
1000 TABLET ORAL ONCE
Status: DISCONTINUED | OUTPATIENT
Start: 2023-11-18 | End: 2023-11-25

## 2023-11-18 RX ORDER — ROPINIROLE 1 MG/1
1 TABLET, FILM COATED ORAL 2 TIMES DAILY
Status: DISCONTINUED | OUTPATIENT
Start: 2023-11-18 | End: 2023-11-25

## 2023-11-18 RX ORDER — ASPIRIN 81 MG/1
81 TABLET, CHEWABLE ORAL DAILY
Status: DISCONTINUED | OUTPATIENT
Start: 2023-11-19 | End: 2023-11-28 | Stop reason: HOSPADM

## 2023-11-18 RX ORDER — PANTOPRAZOLE SODIUM 40 MG/1
40 TABLET, DELAYED RELEASE ORAL
Status: DISCONTINUED | OUTPATIENT
Start: 2023-11-19 | End: 2023-11-28 | Stop reason: HOSPADM

## 2023-11-18 RX ORDER — FENTANYL CITRATE 0.05 MG/ML
50 INJECTION, SOLUTION INTRAMUSCULAR; INTRAVENOUS ONCE
Status: COMPLETED | OUTPATIENT
Start: 2023-11-18 | End: 2023-11-18

## 2023-11-18 RX ORDER — ACETAMINOPHEN 325 MG/1
650 TABLET ORAL EVERY 6 HOURS PRN
Status: DISCONTINUED | OUTPATIENT
Start: 2023-11-18 | End: 2023-11-28 | Stop reason: HOSPADM

## 2023-11-18 RX ORDER — SEVELAMER CARBONATE 800 MG/1
800 TABLET, FILM COATED ORAL
Status: DISCONTINUED | OUTPATIENT
Start: 2023-11-19 | End: 2023-11-28 | Stop reason: HOSPADM

## 2023-11-18 RX ORDER — DEXTROSE MONOHYDRATE 100 MG/ML
INJECTION, SOLUTION INTRAVENOUS CONTINUOUS PRN
Status: DISCONTINUED | OUTPATIENT
Start: 2023-11-18 | End: 2023-11-28 | Stop reason: HOSPADM

## 2023-11-18 RX ORDER — PRIMIDONE 50 MG/1
50 TABLET ORAL DAILY
Status: DISCONTINUED | OUTPATIENT
Start: 2023-11-19 | End: 2023-11-28 | Stop reason: HOSPADM

## 2023-11-18 RX ORDER — ONDANSETRON 4 MG/1
4 TABLET, ORALLY DISINTEGRATING ORAL EVERY 8 HOURS PRN
Status: DISCONTINUED | OUTPATIENT
Start: 2023-11-18 | End: 2023-11-28 | Stop reason: HOSPADM

## 2023-11-18 RX ORDER — TACROLIMUS 1 MG/1
3 CAPSULE ORAL 2 TIMES DAILY
Status: DISCONTINUED | OUTPATIENT
Start: 2023-11-18 | End: 2023-11-28 | Stop reason: HOSPADM

## 2023-11-18 RX ORDER — IPRATROPIUM BROMIDE AND ALBUTEROL SULFATE 2.5; .5 MG/3ML; MG/3ML
1 SOLUTION RESPIRATORY (INHALATION)
Status: DISCONTINUED | OUTPATIENT
Start: 2023-11-18 | End: 2023-11-22

## 2023-11-18 RX ORDER — BUDESONIDE 0.5 MG/2ML
0.5 INHALANT ORAL
Status: DISCONTINUED | OUTPATIENT
Start: 2023-11-18 | End: 2023-11-28 | Stop reason: HOSPADM

## 2023-11-18 RX ORDER — CITALOPRAM 20 MG/1
40 TABLET ORAL
Status: DISCONTINUED | OUTPATIENT
Start: 2023-11-19 | End: 2023-11-28 | Stop reason: HOSPADM

## 2023-11-18 RX ORDER — GLUCAGON 1 MG/ML
1 KIT INJECTION PRN
Status: DISCONTINUED | OUTPATIENT
Start: 2023-11-18 | End: 2023-11-28 | Stop reason: HOSPADM

## 2023-11-18 RX ORDER — SODIUM CHLORIDE 0.9 % (FLUSH) 0.9 %
5-40 SYRINGE (ML) INJECTION PRN
Status: DISCONTINUED | OUTPATIENT
Start: 2023-11-18 | End: 2023-11-28 | Stop reason: HOSPADM

## 2023-11-18 RX ORDER — ONDANSETRON 2 MG/ML
4 INJECTION INTRAMUSCULAR; INTRAVENOUS EVERY 6 HOURS PRN
Status: DISCONTINUED | OUTPATIENT
Start: 2023-11-18 | End: 2023-11-28 | Stop reason: HOSPADM

## 2023-11-18 RX ORDER — POLYETHYLENE GLYCOL 3350 17 G/17G
17 POWDER, FOR SOLUTION ORAL DAILY PRN
Status: DISCONTINUED | OUTPATIENT
Start: 2023-11-18 | End: 2023-11-23 | Stop reason: SDUPTHER

## 2023-11-18 RX ORDER — SODIUM CHLORIDE 0.9 % (FLUSH) 0.9 %
5-40 SYRINGE (ML) INJECTION EVERY 12 HOURS SCHEDULED
Status: DISCONTINUED | OUTPATIENT
Start: 2023-11-18 | End: 2023-11-28 | Stop reason: HOSPADM

## 2023-11-18 RX ORDER — MIDODRINE HYDROCHLORIDE 5 MG/1
5 TABLET ORAL
Status: DISCONTINUED | OUTPATIENT
Start: 2023-11-19 | End: 2023-11-28 | Stop reason: HOSPADM

## 2023-11-18 RX ADMIN — IPRATROPIUM BROMIDE AND ALBUTEROL SULFATE 1 DOSE: .5; 2.5 SOLUTION RESPIRATORY (INHALATION) at 23:11

## 2023-11-18 RX ADMIN — IOPAMIDOL 70 ML: 755 INJECTION, SOLUTION INTRAVENOUS at 20:36

## 2023-11-18 RX ADMIN — METHYLPREDNISOLONE SODIUM SUCCINATE 125 MG: 125 INJECTION, POWDER, FOR SOLUTION INTRAMUSCULAR; INTRAVENOUS at 20:59

## 2023-11-18 RX ADMIN — IPRATROPIUM BROMIDE AND ALBUTEROL SULFATE 3 DOSE: .5; 2.5 SOLUTION RESPIRATORY (INHALATION) at 18:36

## 2023-11-18 RX ADMIN — FENTANYL CITRATE 50 MCG: 0.05 INJECTION, SOLUTION INTRAMUSCULAR; INTRAVENOUS at 21:08

## 2023-11-18 ASSESSMENT — PAIN DESCRIPTION - LOCATION: LOCATION: HEAD;NECK

## 2023-11-18 ASSESSMENT — PAIN SCALES - GENERAL: PAINLEVEL_OUTOF10: 10

## 2023-11-18 NOTE — ED PROVIDER NOTES
Department of Emergency Medicine   ED Provider Note  Admit Date/RoomTime: 11/18/2023  5:59 PM  ED Room: 0630/0630-01          History of Present Illness:  11/18/23, Time: 6:24 PM CRYS Herron is a 63 y.o. female presenting to the ED for SOB. She notes she has chronic shortness of breath and wears 4 L of oxygen at baseline however over the past 2 days she has been having significantly worsening shortness of breath along with diffuse body aches, cough and congestion. She does complain of some mild chest aching with no worsening or relieving factors. Notes she has been having pain in bilateral calves and behind both knees noting it feels similar to when she had a DVT in the past.  Reports she is not on any blood thinners at this time. No headaches, lightheaded or dizziness. She is on dialysis, Monday Wednesday Friday and reports she has not missed a session. On presentation, she is 87% on 8 L nasal cannula, increased to 10 L on high flow nasal cannula with improvement in saturations to 94%. Review of Systems:     Pertinent positives and negatives are stated within HPI.      --------------------------------------------- PAST HISTORY ---------------------------------------------  Past Medical History:  has a past medical history of Arthritis, Cirrhosis of liver ([address]), Depression, Eye abnormalities, Hernia of abdominal wall, Hypertension, and Neuropathy. Past Surgical History:  has a past surgical history that includes Cholecystectomy; fracture surgery; Liver transplant (2015); Hysterectomy; Tooth Extraction; shoulder surgery (Right); eye surgery; shunt revision (Left, 02/04/2021); and Cardiac catheterization (06/06/2023). Social History:  reports that she quit smoking about 4 years ago. Her smoking use included cigarettes. She smoked an average of .5 packs per day. She has never used smokeless tobacco. She reports that she does not drink alcohol and does not use drugs.     Family History: Total Protein 7.5 6.4 - 8.3 g/dL    Albumin 3.7 3.5 - 5.2 g/dL    Total Bilirubin 0.7 0.0 - 1.2 mg/dL    Alkaline Phosphatase 86 35 - 104 U/L    ALT 14 0 - 32 U/L    AST 24 0 - 31 U/L   Troponin   Result Value Ref Range    Troponin, High Sensitivity 54 (H) 0 - 9 ng/L   Lactate, Sepsis   Result Value Ref Range    Lactic Acid, Sepsis 1.5 0.5 - 1.9 mmol/L   Brain Natriuretic Peptide   Result Value Ref Range    Pro-BNP >70,000 (H) 0 - 450 pg/mL   Magnesium   Result Value Ref Range    Magnesium 2.0 1.6 - 2.6 mg/dL   Platelet Confirmation   Result Value Ref Range    Platelet Confirmation CONFIRMED    Troponin   Result Value Ref Range    Troponin, High Sensitivity 47 (H) 0 - 9 ng/L   Lactate, Sepsis   Result Value Ref Range    Lactic Acid, Sepsis 2.0 (H) 0.5 - 1.9 mmol/L   Blood Gas, Arterial   Result Value Ref Range    Date Analyzed [id number]     Time Analyzed 2252     Source: Blood Arterial     pH, Blood Gas 7.471 (H) 7.350 - 7.450    PCO2 40.1 35.0 - 45.0 mmHg    PO2 52.9 (L) 75.0 - 100.0 mmHg    HCO3 28.6 (H) 22.0 - 26.0 mmol/L    B.E. 4.6 (H) -3.0 - 3.0 mmol/L    O2 Sat 86.9 (L) 92.0 - 98.5 %    PO2/FIO2 0.59 mmHg/%    AaDO2 525.2 mmHg    RI(T) 9.93     O2Hb 86.5 (L) 94.0 - 97.0 %    COHb 0.2 0.0 - 1.5 %    MetHb 0.3 0.0 - 1.5 %    O2 Content 11.2 mL/dL    HHb 13.0 (H) 0.0 - 5.0 %    tHb (est) 9.2 (L) 11.5 - 16.5 g/dL    Mode HFNC  10 L     FIO2 90.0 %    Date Of Collection [id number]     Time Collected 2245     Pt Temp 37.0 C     ID [id number]     Lab [id number]     Critical(s) Notified . No Critical Values    EKG 12 Lead   Result Value Ref Range    Ventricular Rate 70 BPM    Atrial Rate 70 BPM    P-R Interval 196 ms    QRS Duration 76 ms    Q-T Interval 398 ms    QTc Calculation (Bazett) 429 ms    P Axis 56 degrees    R Axis 16 degrees    T Axis 171 degrees       RADIOLOGY:  Imaging Interpreted by Radiologist, initial wet read of imaging interpreted by myself  CTA PULMONARY W CONTRAST   Final Result   1.   No indication

## 2023-11-18 NOTE — ED NOTES
Pt's O2 sat 89% on 8 L NC; pt titrated to 10 L via high flow NC per Dr raygoza.      Omari Oleary, JOANN  11/18/23 7716

## 2023-11-19 LAB
ALBUMIN SERPL-MCNC: 3.5 G/DL (ref 3.5–5.2)
ALP SERPL-CCNC: 84 U/L (ref 35–104)
ALT SERPL-CCNC: 13 U/L (ref 0–32)
ANION GAP SERPL CALCULATED.3IONS-SCNC: 13 MMOL/L (ref 7–16)
AST SERPL-CCNC: 22 U/L (ref 0–31)
B PARAP IS1001 DNA NPH QL NAA+NON-PROBE: NOT DETECTED
B PERT DNA SPEC QL NAA+PROBE: NOT DETECTED
BASOPHILS # BLD: 0 K/UL (ref 0–0.2)
BASOPHILS NFR BLD: 0 % (ref 0–2)
BILIRUB SERPL-MCNC: 0.5 MG/DL (ref 0–1.2)
BUN SERPL-MCNC: 40 MG/DL (ref 6–23)
C PNEUM DNA NPH QL NAA+NON-PROBE: NOT DETECTED
CALCIUM SERPL-MCNC: 8.8 MG/DL (ref 8.6–10.2)
CHLORIDE SERPL-SCNC: 91 MMOL/L (ref 98–107)
CO2 SERPL-SCNC: 27 MMOL/L (ref 22–29)
CREAT SERPL-MCNC: 6.7 MG/DL (ref 0.5–1)
EKG ATRIAL RATE: 70 BPM
EKG P AXIS: 56 DEGREES
EKG P-R INTERVAL: 196 MS
EKG Q-T INTERVAL: 398 MS
EKG QRS DURATION: 76 MS
EKG QTC CALCULATION (BAZETT): 429 MS
EKG R AXIS: 16 DEGREES
EKG T AXIS: 171 DEGREES
EKG VENTRICULAR RATE: 70 BPM
EOSINOPHIL # BLD: 0 K/UL (ref 0.05–0.5)
EOSINOPHILS RELATIVE PERCENT: 0 % (ref 0–6)
ERYTHROCYTE [DISTWIDTH] IN BLOOD BY AUTOMATED COUNT: 14.5 % (ref 11.5–15)
FLUAV RNA NPH QL NAA+NON-PROBE: NOT DETECTED
FLUBV RNA NPH QL NAA+NON-PROBE: NOT DETECTED
FOLATE SERPL-MCNC: >20 NG/ML (ref 4.8–24.2)
GFR SERPL CREATININE-BSD FRML MDRD: 7 ML/MIN/1.73M2
GLUCOSE BLD-MCNC: 161 MG/DL (ref 74–99)
GLUCOSE SERPL-MCNC: 326 MG/DL (ref 74–99)
HADV DNA NPH QL NAA+NON-PROBE: NOT DETECTED
HCOV 229E RNA NPH QL NAA+NON-PROBE: NOT DETECTED
HCOV HKU1 RNA NPH QL NAA+NON-PROBE: NOT DETECTED
HCOV NL63 RNA NPH QL NAA+NON-PROBE: NOT DETECTED
HCOV OC43 RNA NPH QL NAA+NON-PROBE: NOT DETECTED
HCT VFR BLD AUTO: 24.5 % (ref 34–48)
HGB BLD-MCNC: 8 G/DL (ref 11.5–15.5)
HMPV RNA NPH QL NAA+NON-PROBE: NOT DETECTED
HPIV1 RNA NPH QL NAA+NON-PROBE: NOT DETECTED
HPIV2 RNA NPH QL NAA+NON-PROBE: NOT DETECTED
HPIV3 RNA NPH QL NAA+NON-PROBE: NOT DETECTED
HPIV4 RNA NPH QL NAA+NON-PROBE: NOT DETECTED
LACTATE BLDV-SCNC: 2 MMOL/L (ref 0.5–1.9)
LYMPHOCYTES NFR BLD: 0.12 K/UL (ref 1.5–4)
LYMPHOCYTES RELATIVE PERCENT: 9 % (ref 20–42)
M PNEUMO DNA NPH QL NAA+NON-PROBE: NOT DETECTED
MAGNESIUM SERPL-MCNC: 2.2 MG/DL (ref 1.6–2.6)
MCH RBC QN AUTO: 34.6 PG (ref 26–35)
MCHC RBC AUTO-ENTMCNC: 32.7 G/DL (ref 32–34.5)
MCV RBC AUTO: 106.1 FL (ref 80–99.9)
MONOCYTES NFR BLD: 0.02 K/UL (ref 0.1–0.95)
MONOCYTES NFR BLD: 2 % (ref 2–12)
NEUTROPHILS NFR BLD: 90 % (ref 43–80)
NEUTS SEG NFR BLD: 1.25 K/UL (ref 1.8–7.3)
PHOSPHATE SERPL-MCNC: 5.9 MG/DL (ref 2.5–4.5)
PLATELET # BLD AUTO: 62 K/UL (ref 130–450)
PLATELET CONFIRMATION: NORMAL
PMV BLD AUTO: 11.8 FL (ref 7–12)
POTASSIUM SERPL-SCNC: 5.1 MMOL/L (ref 3.5–5)
PROCALCITONIN SERPL-MCNC: 0.45 NG/ML (ref 0–0.08)
PROT SERPL-MCNC: 7.3 G/DL (ref 6.4–8.3)
RBC # BLD AUTO: 2.31 M/UL (ref 3.5–5.5)
RBC # BLD: ABNORMAL 10*6/UL
RBC # BLD: ABNORMAL 10*6/UL
RSV RNA NPH QL NAA+NON-PROBE: NOT DETECTED
RV+EV RNA NPH QL NAA+NON-PROBE: NOT DETECTED
SARS-COV-2 RNA NPH QL NAA+NON-PROBE: NOT DETECTED
SODIUM SERPL-SCNC: 131 MMOL/L (ref 132–146)
SPECIMEN DESCRIPTION: NORMAL
T4 FREE SERPL-MCNC: 1.3 NG/DL (ref 0.9–1.7)
TSH SERPL DL<=0.05 MIU/L-ACNC: 0.81 UIU/ML (ref 0.27–4.2)
VIT B12 SERPL-MCNC: 1013 PG/ML (ref 211–946)
WBC OTHER # BLD: 1.4 K/UL (ref 4.5–11.5)

## 2023-11-19 PROCEDURE — 82746 ASSAY OF FOLIC ACID SERUM: CPT

## 2023-11-19 PROCEDURE — 94660 CPAP INITIATION&MGMT: CPT

## 2023-11-19 PROCEDURE — 94640 AIRWAY INHALATION TREATMENT: CPT

## 2023-11-19 PROCEDURE — 6370000000 HC RX 637 (ALT 250 FOR IP): Performed by: INTERNAL MEDICINE

## 2023-11-19 PROCEDURE — 82607 VITAMIN B-12: CPT

## 2023-11-19 PROCEDURE — 85025 COMPLETE CBC W/AUTO DIFF WBC: CPT

## 2023-11-19 PROCEDURE — 5A1D70Z PERFORMANCE OF URINARY FILTRATION, INTERMITTENT, LESS THAN 6 HOURS PER DAY: ICD-10-PCS | Performed by: INTERNAL MEDICINE

## 2023-11-19 PROCEDURE — 2580000003 HC RX 258: Performed by: INTERNAL MEDICINE

## 2023-11-19 PROCEDURE — 6360000002 HC RX W HCPCS: Performed by: INTERNAL MEDICINE

## 2023-11-19 PROCEDURE — 36415 COLL VENOUS BLD VENIPUNCTURE: CPT

## 2023-11-19 PROCEDURE — 80053 COMPREHEN METABOLIC PANEL: CPT

## 2023-11-19 PROCEDURE — 84100 ASSAY OF PHOSPHORUS: CPT

## 2023-11-19 PROCEDURE — 2060000000 HC ICU INTERMEDIATE R&B

## 2023-11-19 PROCEDURE — 83605 ASSAY OF LACTIC ACID: CPT

## 2023-11-19 PROCEDURE — 93010 ELECTROCARDIOGRAM REPORT: CPT | Performed by: INTERNAL MEDICINE

## 2023-11-19 PROCEDURE — 99223 1ST HOSP IP/OBS HIGH 75: CPT | Performed by: INTERNAL MEDICINE

## 2023-11-19 PROCEDURE — 84443 ASSAY THYROID STIM HORMONE: CPT

## 2023-11-19 PROCEDURE — 90935 HEMODIALYSIS ONE EVALUATION: CPT

## 2023-11-19 PROCEDURE — 0202U NFCT DS 22 TRGT SARS-COV-2: CPT

## 2023-11-19 PROCEDURE — 84439 ASSAY OF FREE THYROXINE: CPT

## 2023-11-19 PROCEDURE — 84145 PROCALCITONIN (PCT): CPT

## 2023-11-19 PROCEDURE — 83735 ASSAY OF MAGNESIUM: CPT

## 2023-11-19 PROCEDURE — 2700000000 HC OXYGEN THERAPY PER DAY

## 2023-11-19 PROCEDURE — 82962 GLUCOSE BLOOD TEST: CPT

## 2023-11-19 RX ORDER — FENTANYL CITRATE 0.05 MG/ML
25 INJECTION, SOLUTION INTRAMUSCULAR; INTRAVENOUS EVERY 6 HOURS PRN
Status: DISCONTINUED | OUTPATIENT
Start: 2023-11-19 | End: 2023-11-28

## 2023-11-19 RX ORDER — ENTECAVIR 1 MG/1
0.5 TABLET, FILM COATED ORAL EVERY OTHER DAY
Status: DISCONTINUED | OUTPATIENT
Start: 2023-11-19 | End: 2023-11-19 | Stop reason: SDUPTHER

## 2023-11-19 RX ORDER — ENTECAVIR 0.5 MG/1
0.5 TABLET, FILM COATED ORAL EVERY OTHER DAY
Status: DISCONTINUED | OUTPATIENT
Start: 2023-11-20 | End: 2023-11-28 | Stop reason: HOSPADM

## 2023-11-19 RX ORDER — ENTECAVIR 1 MG/1
0.5 TABLET, FILM COATED ORAL EVERY OTHER DAY
Status: DISCONTINUED | OUTPATIENT
Start: 2023-11-20 | End: 2023-11-19 | Stop reason: SDUPTHER

## 2023-11-19 RX ADMIN — ROPINIROLE HYDROCHLORIDE 1 MG: 1 TABLET, FILM COATED ORAL at 00:34

## 2023-11-19 RX ADMIN — IPRATROPIUM BROMIDE AND ALBUTEROL SULFATE 1 DOSE: .5; 2.5 SOLUTION RESPIRATORY (INHALATION) at 14:30

## 2023-11-19 RX ADMIN — METHYLPREDNISOLONE SODIUM SUCCINATE 40 MG: 40 INJECTION, POWDER, FOR SOLUTION INTRAMUSCULAR; INTRAVENOUS at 13:34

## 2023-11-19 RX ADMIN — FENTANYL CITRATE 25 MCG: 0.05 INJECTION, SOLUTION INTRAMUSCULAR; INTRAVENOUS at 14:03

## 2023-11-19 RX ADMIN — TACROLIMUS 3 MG: 1 CAPSULE ORAL at 13:27

## 2023-11-19 RX ADMIN — MIDODRINE HYDROCHLORIDE 5 MG: 5 TABLET ORAL at 16:55

## 2023-11-19 RX ADMIN — FENTANYL CITRATE 25 MCG: 0.05 INJECTION, SOLUTION INTRAMUSCULAR; INTRAVENOUS at 04:57

## 2023-11-19 RX ADMIN — IPRATROPIUM BROMIDE AND ALBUTEROL SULFATE 1 DOSE: .5; 2.5 SOLUTION RESPIRATORY (INHALATION) at 05:24

## 2023-11-19 RX ADMIN — BUDESONIDE INHALATION 500 MCG: 0.5 SUSPENSION RESPIRATORY (INHALATION) at 18:26

## 2023-11-19 RX ADMIN — TACROLIMUS 3 MG: 1 CAPSULE ORAL at 00:33

## 2023-11-19 RX ADMIN — Medication 5 MG: at 00:34

## 2023-11-19 RX ADMIN — FENTANYL CITRATE 25 MCG: 0.05 INJECTION, SOLUTION INTRAMUSCULAR; INTRAVENOUS at 21:59

## 2023-11-19 RX ADMIN — METHYLPREDNISOLONE SODIUM SUCCINATE 40 MG: 40 INJECTION, POWDER, FOR SOLUTION INTRAMUSCULAR; INTRAVENOUS at 05:01

## 2023-11-19 RX ADMIN — PRIMIDONE 50 MG: 50 TABLET ORAL at 13:25

## 2023-11-19 RX ADMIN — IPRATROPIUM BROMIDE AND ALBUTEROL SULFATE 1 DOSE: .5; 2.5 SOLUTION RESPIRATORY (INHALATION) at 21:03

## 2023-11-19 RX ADMIN — ASPIRIN 81 MG CHEWABLE TABLET 81 MG: 81 TABLET CHEWABLE at 13:25

## 2023-11-19 RX ADMIN — SEVELAMER CARBONATE 800 MG: 800 TABLET, FILM COATED ORAL at 13:26

## 2023-11-19 RX ADMIN — PANTOPRAZOLE SODIUM 40 MG: 40 TABLET, DELAYED RELEASE ORAL at 13:31

## 2023-11-19 RX ADMIN — SEVELAMER CARBONATE 800 MG: 800 TABLET, FILM COATED ORAL at 16:55

## 2023-11-19 RX ADMIN — Medication 10 ML: at 13:34

## 2023-11-19 RX ADMIN — ROPINIROLE HYDROCHLORIDE 1 MG: 1 TABLET, FILM COATED ORAL at 13:27

## 2023-11-19 RX ADMIN — TACROLIMUS 3 MG: 1 CAPSULE ORAL at 21:59

## 2023-11-19 RX ADMIN — BUDESONIDE INHALATION 500 MCG: 0.5 SUSPENSION RESPIRATORY (INHALATION) at 05:24

## 2023-11-19 RX ADMIN — Medication 5 MG: at 21:59

## 2023-11-19 RX ADMIN — ROPINIROLE HYDROCHLORIDE 1 MG: 1 TABLET, FILM COATED ORAL at 21:59

## 2023-11-19 RX ADMIN — VANCOMYCIN HYDROCHLORIDE 1250 MG: 10 INJECTION, POWDER, LYOPHILIZED, FOR SOLUTION INTRAVENOUS at 16:48

## 2023-11-19 RX ADMIN — MIDODRINE HYDROCHLORIDE 5 MG: 5 TABLET ORAL at 13:25

## 2023-11-19 RX ADMIN — METHYLPREDNISOLONE SODIUM SUCCINATE 40 MG: 40 INJECTION, POWDER, FOR SOLUTION INTRAMUSCULAR; INTRAVENOUS at 21:59

## 2023-11-19 RX ADMIN — CITALOPRAM HYDROBROMIDE 40 MG: 20 TABLET ORAL at 13:31

## 2023-11-19 RX ADMIN — CEFEPIME 1000 MG: 1 INJECTION, POWDER, FOR SOLUTION INTRAMUSCULAR; INTRAVENOUS at 13:58

## 2023-11-19 RX ADMIN — IPRATROPIUM BROMIDE AND ALBUTEROL SULFATE 1 DOSE: .5; 2.5 SOLUTION RESPIRATORY (INHALATION) at 18:26

## 2023-11-19 RX ADMIN — ARFORMOTEROL TARTRATE 15 MCG: 15 SOLUTION RESPIRATORY (INHALATION) at 05:24

## 2023-11-19 RX ADMIN — Medication 10 ML: at 22:00

## 2023-11-19 ASSESSMENT — PAIN SCALES - GENERAL
PAINLEVEL_OUTOF10: 10
PAINLEVEL_OUTOF10: 5
PAINLEVEL_OUTOF10: 9
PAINLEVEL_OUTOF10: 8

## 2023-11-19 ASSESSMENT — PAIN DESCRIPTION - LOCATION
LOCATION: BACK;GENERALIZED;NECK
LOCATION: CHEST
LOCATION: HIP;HEAD

## 2023-11-19 ASSESSMENT — PAIN DESCRIPTION - DESCRIPTORS
DESCRIPTORS: THROBBING;ACHING;DISCOMFORT
DESCRIPTORS: ACHING
DESCRIPTORS: ACHING

## 2023-11-19 ASSESSMENT — PAIN SCALES - WONG BAKER
WONGBAKER_NUMERICALRESPONSE: 0
WONGBAKER_NUMERICALRESPONSE: 0

## 2023-11-19 NOTE — CONSULTS
Assessment and Plan  Patient is a 63 y.o. female with the following medical Problems:   Acute on chronic hypoxic respiratory failure requiring BiPAP. Acute pulmonary edema  Atelectasis  COPD with mild exacerbation  Suspected healthcare associated pneumonia  Pancytopenia  Severe secondary pulmonary hypertension  Morbid obesity  Obstructive sleep apnea on PAP  End-stage renal disease on hemodialysis  S/P liver transplant  Heart failure with preserved ejection fraction  Valvular heart disease  Former nicotine abuse  Hypertension  Dyslipidemia  Peripheral neuropathy  Depression  Anxiety  Splenomegaly with suspected hypersplenism    Plan of care:  Optimize volume status with hemodialysis  Low-dose Solu-Medrol for acute exacerbation of COPD  Scheduled DuoNeb and scheduled Pulmicort  Supplemental oxygen to keep sat 88 to 94%  Continue with cefepime and vancomycin. We will consider bronchoscopy early next week to rule out PCP. Monitor fever curve and leukocytosis. Patient is pancytopenic. SCDs for DVT prophylaxis  Continue with immunosuppressive medication. We will consider Bactrim if respiratory status worsens. Incentive spirometer  GI prophylaxis  Strictly avoid benzodiazepine and opioids  Respiratory viral panel    History of Present Illness:   Patient is a 72-year-old woman with above-mentioned medical problems who is chronically on supplemental oxygen at 4 L/min. She was admitted on November 18, 2023 with progressive shortness of breath and productive cough of yellowish sputum. She was started on cefepime and vancomycin. Patient is chronically pancytopenic.        Past Medical History:  Past Medical History:   Diagnosis Date    Arthritis     Cirrhosis of liver ([address])     Depression     Eye abnormalities     blood behid retina    Hernia of abdominal wall     Hypertension     Neuropathy       Past Surgical History:   Procedure Laterality Date    CARDIAC CATHETERIZATION  06/06/2023    [facility]    CHOLECYSTECTOMY Current Medications:     Current Facility-Administered Medications:     fentaNYL (SUBLIMAZE) injection 25 mcg, 25 mcg, IntraVENous, Q6H PRN, Bren Alpers U, DO, 25 mcg at 11/19/23 0457    methylPREDNISolone sodium (PF) (SOLU-MEDROL PF) injection 40 mg, 40 mg, IntraVENous, Q8H, Baldo, Ismail U, DO, 40 mg at 11/19/23 0501    entecavir (BARACLUDE) tablet 0.5 mg, 0.5 mg, Oral, Every Other Day, Baldo, Ismail U, DO    linaclotide (LINZESS) capsule 290 mcg (Patient Supplied), 290 mcg, Oral, QAM AC, Baldo, Ismail U, DO    [START ON 11/20/2023] epoetin maggi-epbx (RETACRIT) injection 3,000 Units, 30 Units/kg, SubCUTAneous, Once per day on Mon Wed Fri, Dipak BAINS MD    cefepime (MAXIPIME) 1,000 mg in sodium chloride 0.9 % 50 mL IVPB (Sgrk0Acq), 1,000 mg, IntraVENous, Q24H, Tu Carmen, DO    acetaminophen (TYLENOL) tablet 1,000 mg, 1,000 mg, Oral, Once, Glenys Fail, DO    glucose chewable tablet 16 g, 4 tablet, Oral, PRN, Baldo, Ismail U, DO    dextrose bolus 10% 125 mL, 125 mL, IntraVENous, PRN **OR** dextrose bolus 10% 250 mL, 250 mL, IntraVENous, PRN, Baldo, Ismail U, DO    glucagon injection 1 mg, 1 mg, SubCUTAneous, PRN, Baldo, Ismail U, DO    dextrose 10 % infusion, , IntraVENous, Continuous PRN, Baldo, Ismail U, DO    sodium chloride flush 0.9 % injection 5-40 mL, 5-40 mL, IntraVENous, 2 times per day, Baldo, Ismail U, DO    sodium chloride flush 0.9 % injection 5-40 mL, 5-40 mL, IntraVENous, PRN, Baldo, Ismail U, DO    0.9 % sodium chloride infusion, , IntraVENous, PRN, Baldo, Ismail U, DO    ondansetron (ZOFRAN-ODT) disintegrating tablet 4 mg, 4 mg, Oral, Q8H PRN **OR** ondansetron (ZOFRAN) injection 4 mg, 4 mg, IntraVENous, Q6H PRN, Baldo, Ismail U, DO    polyethylene glycol (GLYCOLAX) packet 17 g, 17 g, Oral, Daily PRN, Baldo, Ismail U, DO    acetaminophen (TYLENOL) tablet 650 mg, 650 mg, Oral, Q6H PRN **OR** acetaminophen (TYLENOL) suppository 650 mg, 650 mg, Rectal, Q6H PRN,

## 2023-11-19 NOTE — ED NOTES
Radiology Procedure Waiver   Name: Johana Medina  : 1960  MRN: [id number]    Date:  23    Time: 8:18 PM EST    Benefits of immediately proceeding with radiology exam(s) without pre-testing outweigh the risks or are not indicated as specified below and therefore the following is/are being waived:    [x] Benefits of immediate radiology exam(s) outweigh any risk. OR    Pre-exam testing is not indicated for the following reason(s):  [] Pregnancy test   [] Patients LMP on-time and regular.   [] Patient had Tubal Ligation or has other Contraception Device. [] Patient  is Menopausal or Premenarcheal.    [] Patient had Full or Partial Hysterectomy. [] Protocol for CT contrast allegry   [] Patient has tolerated well previously   [] Patient does not have a true allergy    [] MRI Questionnaire     [] BUN/Creatinine   [] Patient age w/no hx of renal dysfunction. [x] Patient on Dialysis. [] Recent Normal Labs.   Electronically signed by Minerva Gurrola MD on 23 at 8:18 PM EST               Minerva Gurrola MD  23 2018

## 2023-11-19 NOTE — PROGRESS NOTES
Pharmacy Consultation Note  (Antibiotic Dosing and Monitoring)    Initial consult date: 11/19/23  Consulting physician/provider: Jw Nevarez DO  Drug: Vancomycin  Indication: Pneumonia (HAP)    Age/  Gender Height Weight IBW  Allergy Information   62 y.o./female 160 cm (5' 3\") 97.1 kg (214 lb)     Ideal body weight: 52.4 kg (115 lb 8.3 oz)  Adjusted ideal body weight: 70.4 kg (155 lb 4.7 oz)   Tape [adhesive tape]      Renal Function:  Recent Labs     11/18/23 1917 11/19/23  0531   BUN 32* 40*   CREATININE 6.1* 6.7*       Intake/Output Summary (Last 24 hours) at 11/19/2023 1259  Last data filed at 11/19/2023 1245  Gross per 24 hour   Intake 300 ml   Output 2300 ml   Net -2000 ml       Vancomycin Monitoring:  Trough:  No results for input(s): \"VANCOTROUGH\" in the last 72 hours. Random:  No results for input(s): \"VANCORANDOM\" in the last 72 hours. No results for input(s): \"BLOODCULT2\" in the last 72 hours. Historical Cultures:  No results found for: \"ORG\"  No results for input(s): \"BC\" in the last 72 hours. Vancomycin Administration Times:  Recent vancomycin administrations        No vancomycin IV orders with administrations found. Orders not given:            vancomycin (VANCOCIN) 1,250 mg in sodium chloride 0.9 % 250 mL IVPB                    Assessment:  Patient is a 63 y.o. female who has been initiated on vancomycin  Estimated Creatinine Clearance: 10 mL/min (A) (based on SCr of 6.7 mg/dL (H)).   To dose vancomycin, pharmacy will be utilizing dosing based off of levels due to patient requiring hemodialysis    Plan:  Vancomycin 1250 mg IV x 1 dose  Will check vancomycin levels when appropriate  Will continue to monitor renal function   Pharmacy to follow      Melissa Mahmood [facility] 11/19/2023 12:59 PM

## 2023-11-19 NOTE — CONSULTS
106.1 11/19/2023 05:31 AM    MCH 34.6 11/19/2023 05:31 AM    MCHC 32.7 11/19/2023 05:31 AM    RDW 14.5 11/19/2023 05:31 AM    NRBC 1 10/30/2023 09:55 AM    SEGSPCT 45 07/24/2013 01:15 PM    LYMPHOPCT 9 11/19/2023 05:31 AM    MONOPCT 2 11/19/2023 05:31 AM    MYELOPCT 4 11/18/2023 07:17 PM    BASOPCT 0 11/19/2023 05:31 AM    MONOSABS 0.02 11/19/2023 05:31 AM    LYMPHSABS 0.12 11/19/2023 05:31 AM    EOSABS 0.00 11/19/2023 05:31 AM    BASOSABS 0.00 11/19/2023 05:31 AM     CMP:    Lab Results   Component Value Date/Time     11/19/2023 05:31 AM    K 5.1 11/19/2023 05:31 AM    K 4.2 02/04/2021 06:45 AM    CL 91 11/19/2023 05:31 AM    CO2 27 11/19/2023 05:31 AM    BUN 40 11/19/2023 05:31 AM    CREATININE 6.7 11/19/2023 05:31 AM    GFRAA 13 02/04/2021 06:45 AM    LABGLOM 7 11/19/2023 05:31 AM    GLUCOSE 326 11/19/2023 05:31 AM    PROT 7.3 11/19/2023 05:31 AM    LABALBU 3.5 11/19/2023 05:31 AM    CALCIUM 8.8 11/19/2023 05:31 AM    BILITOT 0.5 11/19/2023 05:31 AM    ALKPHOS 84 11/19/2023 05:31 AM    AST 22 11/19/2023 05:31 AM    ALT 13 11/19/2023 05:31 AM     Ionized Calcium:  No results found for: \"IONCA\"  Magnesium:    Lab Results   Component Value Date/Time    MG 2.2 11/19/2023 05:31 AM     Phosphorus:    Lab Results   Component Value Date/Time    PHOS 5.9 11/19/2023 05:31 AM     U/A:    Lab Results   Component Value Date/Time    COLORU YELLOW 07/24/2013 01:12 PM    PHUR 7.5 07/24/2013 01:12 PM    WBCUA NONE 07/24/2013 01:12 PM    RBCUA NONE 07/24/2013 01:12 PM    BACTERIA NONE 07/24/2013 01:12 PM    CLARITYU CLEAR 07/24/2013 01:12 PM    SPECGRAV 1.010 07/24/2013 01:12 PM    LEUKOCYTESUR NEGATIVE 07/24/2013 01:12 PM    UROBILINOGEN 0.2 07/24/2013 01:12 PM    BILIRUBINUR NEGATIVE 07/24/2013 01:12 PM    BLOODU NEGATIVE 07/24/2013 01:12 PM    GLUCOSEU NEGATIVE 07/24/2013 01:12 PM     Microalbumen/Creatinine ratio:  No components found for: \"RUCREAT\"  Iron Saturation:  No components found for: \"PERCENTFE\"  TIBC:    Lab Results   Component Value Date/Time    TIBC 217 10/01/2023 03:14 PM     FERRITIN:    Lab Results   Component Value Date/Time    FERRITIN 1,320 10/01/2023 03:14 PM        Imaging:  CTA PULMONARY W CONTRAST   Final Result   1. No indication for acute pulmonary embolus in the right or in the left   lung pulmonary artery circulation or in the central mediastinal pulmonary   arteries. 2.  No aneurysm formation or dissection of the thoracic aorta. 3.  Progressive areas of confluent multi segmental atelectasis in the   posteromedial aspect of the right lower lobe they appear to be form confluent   areas of round atelectasis. 4.  Also more prominent peripheral subpleural thickening in the posterior   aspect of the left lower lobe. 5.  Discrete areas of atelectasis and suspect minimal ground-glass opacity in   the right upper lobe could represent in addition to subsegmental atelectasis   discrete areas of ongoing pneumonitis. Please correlate clinically. Vascular duplex lower extremity venous bilateral   Final Result   No evidence of DVT in either lower extremity. XR CHEST PORTABLE   Final Result   No acute cardiopulmonary process. Assessment  ESRD due to diabetic nephropathy, renal microvascular atherosclerotic disease  Anemia due to ESRD  Secondary hyperparathyroidism due to ESRD  Hypertension  Diabetic gastroparesis  Chronic constipation and usually takes Senokot 2 tablets twice a day Dulcolax as needed at home. Notes that Colace does not seem to help much.   Chronic respiratory insufficiency on 4 L of normal O2 per nasal cannula at baseline  Pancytopenia, possibly secondary to methotrexate and DMARDs in setting treatment for RA     Recommendations  Continue IHD support for solute and volume clearance as per outpatient orders and dry weight  Add additional ultrafiltration treatment tomorrow  Continue Retacrit per protocol  Continue other aspects of renal

## 2023-11-19 NOTE — H&P
Department of Internal Medicine  History and Physical    PCP: Crow Callaway DO  Admitting Physician: Dr. Christelle Ragsdale  Consultants:   Date of Service: 11/18/2023    CHIEF COMPLAINT:  sob    HISTORY OF PRESENT ILLNESS:    Patient is 63-year-old female presented to the ED with shortness of breath. Patient states she has chronic shortness of breath she is on 4 L of supplemental oxygen at baseline. However over the last 3 days she has been increasingly more short of breath. She does not admit to significant increase in cough or sputum production. He does she does have orthopnea. She does admit to increased fatigue and generalized body tenderness. She also admits to headache and neck pain. States that she has diminished appetite over the last few days due to sickness. PAST MEDICAL Hx:  Past Medical History:   Diagnosis Date    Arthritis     Cirrhosis of liver ([address])     Depression     Eye abnormalities     blood behid retina    Hernia of abdominal wall     Hypertension     Neuropathy        PAST SURGICAL Hx:   Past Surgical History:   Procedure Laterality Date    CARDIAC CATHETERIZATION  06/06/2023    [facility]    CHOLECYSTECTOMY      EYE SURGERY      Bilateral eye \"old blood taken out about 2 yes ago 2019\"    FRACTURE SURGERY      HYSTERECTOMY (CERVIX STATUS UNKNOWN)      LIVER TRANSPLANT  2015    SHOULDER SURGERY Right     SHUNT REVISION Left 02/04/2021    AV GRAFT LEFT ARM performed by Christine Campbell MD at [address] Hx:  No family history on file. HOME MEDICATIONS:  Prior to Admission medications    Medication Sig Start Date End Date Taking?  Authorizing Provider   sevelamer (RENVELA) 800 MG tablet Take 1 tablet by mouth 3 times daily (with meals)  Patient not taking: Reported on 11/19/2023 10/29/23   Rodríguez Diones A, DO   midodrine (PROAMATINE) 5 MG tablet Take 1 tablet by mouth 3 times daily (with meals) 10/29/23   Rodríguez Diones A, DO   ondansetron (ZOFRAN-ODT) 4 MG well.  Respiratory panel ordered.         [address] [place] [address]  6:07 AM  11/19/2023    Electronically signed by Shannon [facility] D[facility]  on 11/19/23 at 2:25 AM EST

## 2023-11-19 NOTE — PROGRESS NOTES
Department of Internal Medicine  PN    PCP: Elma Otero DO  Admitting Physician: Dr. Emmanuel [facility]  Consultants:   Date of Service: 11/18/2023    CHIEF COMPLAINT:  sob    HISTORY OF PRESENT ILLNESS:    Patient is 62-year-old female presented to the ED with shortness of breath. Patient states she has chronic shortness of breath she is on 4 L of supplemental oxygen at baseline. However over the last 3 days she has been increasingly more short of breath. She does not admit to significant increase in cough or sputum production. He does she does have orthopnea. She does admit to increased fatigue and generalized body tenderness. She also admits to headache and neck pain. States that she has diminished appetite over the last few days due to sickness. 11/19/2023  Patient seen examined on [facility]. Case discussed with pulmonologist today. BUN/creatinine 40/6.7 potassium 5.1. Liver enzymes normal with. Patient had proBNP on admission greater than 70,000. WBC 1.4 with hemoglobin 8 and platelet count of 62. Temperature 97.3 with heart rate 66 and blood pressure 117/56. O2 sat 98% with the patient on BiPAP FiO2 60%. Chest x-ray on admission was unremarkable with a CTA showing no evidence of PE with atelectasis with this discrete area of atelectasis and suspected minimal groundglass opacity right upper lobe. With patient's procalcitonin increased to 0.45 we will start antibiotics with vancomycin and cefepime with the patient just finishing up doxycycline and Omnicef. Pulmonology to evaluate for possible HCAP versus patient respiratory status purely by volume overload and with her renal failure and hemodialysis.     PAST MEDICAL Hx:  Past Medical History:   Diagnosis Date    Arthritis     Cirrhosis of liver ([address])     Depression     Eye abnormalities     blood behid retina    Hernia of abdominal wall     Hypertension     Neuropathy        PAST SURGICAL Hx:   Past Surgical History:   Procedure Laterality Date hypoxic respiratory failure  COPD exacerbation  Severe pulmonary hypertension  Possible pneumonitis  Groundglass opacities  Hx of dvt  ESRD on Dialysis   History of liver transplant  Splenomegaly  Chronic macrocytic anemia  Chronic diastolic congestive heart failure  Moderate tricuspid regurgitation  Mild aortic regurgitation  MAVERICK without use of CPAP  Hyperlipidemia  Hypertension  Peripheral neuropathy  Depression/anxiety  History of tobacco abuse        Patient presented with shortness of breath. Patient found to be acutely hypoxic on 2 L of supplemental oxygen currently. States that deep breaths increase the pain or she has pain with deep breaths. Initial work-up did not reveal any pneumonia. It did reveal pneumonitis. Patient given IV steroids and nebulized breathing treatments which will be continued. Fentanyl for pain. We will consider IV diuretics as well. Respiratory panel ordered. Home medication reviewed  Consult pulmonology  Start IV vancomycin and cefepime until seen by pulmonology  Repeat procalcitonin  Patient receiving dialysis today    BMP, CBC in a.mNacho Hernández DO  9:48 AM  11/19/2023

## 2023-11-20 LAB
ALBUMIN SERPL-MCNC: 3.7 G/DL (ref 3.5–5.2)
ALP SERPL-CCNC: 80 U/L (ref 35–104)
ALT SERPL-CCNC: 14 U/L (ref 0–32)
ANION GAP SERPL CALCULATED.3IONS-SCNC: 14 MMOL/L (ref 7–16)
AST SERPL-CCNC: 20 U/L (ref 0–31)
BASOPHILS # BLD: 0 K/UL (ref 0–0.2)
BASOPHILS NFR BLD: 0 % (ref 0–2)
BILIRUB SERPL-MCNC: 0.4 MG/DL (ref 0–1.2)
BUN SERPL-MCNC: 32 MG/DL (ref 6–23)
CALCIUM SERPL-MCNC: 9.2 MG/DL (ref 8.6–10.2)
CHLORIDE SERPL-SCNC: 92 MMOL/L (ref 98–107)
CO2 SERPL-SCNC: 28 MMOL/L (ref 22–29)
CREAT SERPL-MCNC: 4.8 MG/DL (ref 0.5–1)
DATE LAST DOSE: NORMAL
EOSINOPHIL # BLD: 0 K/UL (ref 0.05–0.5)
EOSINOPHILS RELATIVE PERCENT: 0 % (ref 0–6)
ERYTHROCYTE [DISTWIDTH] IN BLOOD BY AUTOMATED COUNT: 14.5 % (ref 11.5–15)
FERRITIN SERPL-MCNC: 2031 NG/ML
GFR SERPL CREATININE-BSD FRML MDRD: 10 ML/MIN/1.73M2
GLUCOSE SERPL-MCNC: 239 MG/DL (ref 74–99)
HCT VFR BLD AUTO: 25.2 % (ref 34–48)
HGB BLD-MCNC: 8.3 G/DL (ref 11.5–15.5)
IRON SATN MFR SERPL: 78 % (ref 15–50)
IRON SERPL-MCNC: 120 UG/DL (ref 37–145)
LYMPHOCYTES NFR BLD: 0.18 K/UL (ref 1.5–4)
LYMPHOCYTES RELATIVE PERCENT: 5 % (ref 20–42)
MAGNESIUM SERPL-MCNC: 2.3 MG/DL (ref 1.6–2.6)
MCH RBC QN AUTO: 34.2 PG (ref 26–35)
MCHC RBC AUTO-ENTMCNC: 32.9 G/DL (ref 32–34.5)
MCV RBC AUTO: 103.7 FL (ref 80–99.9)
MONOCYTES NFR BLD: 0.09 K/UL (ref 0.1–0.95)
MONOCYTES NFR BLD: 3 % (ref 2–12)
NEUTROPHILS NFR BLD: 92 % (ref 43–80)
NEUTS SEG NFR BLD: 3.23 K/UL (ref 1.8–7.3)
PHOSPHATE SERPL-MCNC: 5.1 MG/DL (ref 2.5–4.5)
PLATELET # BLD AUTO: 80 K/UL (ref 130–450)
PLATELET CONFIRMATION: NORMAL
PMV BLD AUTO: 11.2 FL (ref 7–12)
POTASSIUM SERPL-SCNC: 4.7 MMOL/L (ref 3.5–5)
PROT SERPL-MCNC: 7.4 G/DL (ref 6.4–8.3)
RBC # BLD AUTO: 2.43 M/UL (ref 3.5–5.5)
RBC # BLD: ABNORMAL 10*6/UL
SODIUM SERPL-SCNC: 134 MMOL/L (ref 132–146)
TIBC SERPL-MCNC: 153 UG/DL (ref 250–450)
TME LAST DOSE: NORMAL H
VANCOMYCIN DOSE: NORMAL MG
VANCOMYCIN SERPL-MCNC: 17.1 UG/ML (ref 5–40)
WBC OTHER # BLD: 3.5 K/UL (ref 4.5–11.5)

## 2023-11-20 PROCEDURE — 83540 ASSAY OF IRON: CPT

## 2023-11-20 PROCEDURE — 2700000000 HC OXYGEN THERAPY PER DAY

## 2023-11-20 PROCEDURE — 94640 AIRWAY INHALATION TREATMENT: CPT

## 2023-11-20 PROCEDURE — 90935 HEMODIALYSIS ONE EVALUATION: CPT

## 2023-11-20 PROCEDURE — 82728 ASSAY OF FERRITIN: CPT

## 2023-11-20 PROCEDURE — 2060000000 HC ICU INTERMEDIATE R&B

## 2023-11-20 PROCEDURE — 36415 COLL VENOUS BLD VENIPUNCTURE: CPT

## 2023-11-20 PROCEDURE — 6360000002 HC RX W HCPCS: Performed by: INTERNAL MEDICINE

## 2023-11-20 PROCEDURE — 99233 SBSQ HOSP IP/OBS HIGH 50: CPT | Performed by: INTERNAL MEDICINE

## 2023-11-20 PROCEDURE — 83550 IRON BINDING TEST: CPT

## 2023-11-20 PROCEDURE — 80053 COMPREHEN METABOLIC PANEL: CPT

## 2023-11-20 PROCEDURE — 97116 GAIT TRAINING THERAPY: CPT | Performed by: PHYSICAL THERAPIST

## 2023-11-20 PROCEDURE — 80202 ASSAY OF VANCOMYCIN: CPT

## 2023-11-20 PROCEDURE — 83735 ASSAY OF MAGNESIUM: CPT

## 2023-11-20 PROCEDURE — 85025 COMPLETE CBC W/AUTO DIFF WBC: CPT

## 2023-11-20 PROCEDURE — 2580000003 HC RX 258: Performed by: INTERNAL MEDICINE

## 2023-11-20 PROCEDURE — 6370000000 HC RX 637 (ALT 250 FOR IP): Performed by: INTERNAL MEDICINE

## 2023-11-20 PROCEDURE — 94660 CPAP INITIATION&MGMT: CPT

## 2023-11-20 PROCEDURE — 84100 ASSAY OF PHOSPHORUS: CPT

## 2023-11-20 PROCEDURE — 97161 PT EVAL LOW COMPLEX 20 MIN: CPT | Performed by: PHYSICAL THERAPIST

## 2023-11-20 RX ADMIN — BUDESONIDE INHALATION 500 MCG: 0.5 SUSPENSION RESPIRATORY (INHALATION) at 17:26

## 2023-11-20 RX ADMIN — SEVELAMER CARBONATE 800 MG: 800 TABLET, FILM COATED ORAL at 13:31

## 2023-11-20 RX ADMIN — Medication 5 MG: at 20:59

## 2023-11-20 RX ADMIN — Medication 10 ML: at 13:40

## 2023-11-20 RX ADMIN — MIDODRINE HYDROCHLORIDE 5 MG: 5 TABLET ORAL at 13:31

## 2023-11-20 RX ADMIN — FENTANYL CITRATE 25 MCG: 0.05 INJECTION, SOLUTION INTRAMUSCULAR; INTRAVENOUS at 13:51

## 2023-11-20 RX ADMIN — ROPINIROLE HYDROCHLORIDE 1 MG: 1 TABLET, FILM COATED ORAL at 20:59

## 2023-11-20 RX ADMIN — ASPIRIN 81 MG CHEWABLE TABLET 81 MG: 81 TABLET CHEWABLE at 13:31

## 2023-11-20 RX ADMIN — METHYLPREDNISOLONE SODIUM SUCCINATE 40 MG: 40 INJECTION, POWDER, FOR SOLUTION INTRAMUSCULAR; INTRAVENOUS at 20:59

## 2023-11-20 RX ADMIN — IPRATROPIUM BROMIDE AND ALBUTEROL SULFATE 1 DOSE: .5; 2.5 SOLUTION RESPIRATORY (INHALATION) at 04:05

## 2023-11-20 RX ADMIN — PANTOPRAZOLE SODIUM 40 MG: 40 TABLET, DELAYED RELEASE ORAL at 13:31

## 2023-11-20 RX ADMIN — CITALOPRAM HYDROBROMIDE 40 MG: 20 TABLET ORAL at 13:31

## 2023-11-20 RX ADMIN — VANCOMYCIN HYDROCHLORIDE 1250 MG: 10 INJECTION, POWDER, LYOPHILIZED, FOR SOLUTION INTRAVENOUS at 13:54

## 2023-11-20 RX ADMIN — BUDESONIDE INHALATION 500 MCG: 0.5 SUSPENSION RESPIRATORY (INHALATION) at 04:05

## 2023-11-20 RX ADMIN — FENTANYL CITRATE 25 MCG: 0.05 INJECTION, SOLUTION INTRAMUSCULAR; INTRAVENOUS at 21:06

## 2023-11-20 RX ADMIN — TACROLIMUS 3 MG: 1 CAPSULE ORAL at 13:33

## 2023-11-20 RX ADMIN — IPRATROPIUM BROMIDE AND ALBUTEROL SULFATE 1 DOSE: .5; 2.5 SOLUTION RESPIRATORY (INHALATION) at 17:26

## 2023-11-20 RX ADMIN — CEFEPIME 1000 MG: 1 INJECTION, POWDER, FOR SOLUTION INTRAMUSCULAR; INTRAVENOUS at 15:59

## 2023-11-20 RX ADMIN — MIDODRINE HYDROCHLORIDE 5 MG: 5 TABLET ORAL at 17:36

## 2023-11-20 RX ADMIN — METHYLPREDNISOLONE SODIUM SUCCINATE 40 MG: 40 INJECTION, POWDER, FOR SOLUTION INTRAMUSCULAR; INTRAVENOUS at 13:43

## 2023-11-20 RX ADMIN — PRIMIDONE 50 MG: 50 TABLET ORAL at 13:31

## 2023-11-20 RX ADMIN — IPRATROPIUM BROMIDE AND ALBUTEROL SULFATE 1 DOSE: .5; 2.5 SOLUTION RESPIRATORY (INHALATION) at 21:39

## 2023-11-20 RX ADMIN — EPOETIN ALFA-EPBX 3000 UNITS: 3000 INJECTION, SOLUTION INTRAVENOUS; SUBCUTANEOUS at 17:36

## 2023-11-20 RX ADMIN — TACROLIMUS 3 MG: 1 CAPSULE ORAL at 20:59

## 2023-11-20 RX ADMIN — METHYLPREDNISOLONE SODIUM SUCCINATE 40 MG: 40 INJECTION, POWDER, FOR SOLUTION INTRAMUSCULAR; INTRAVENOUS at 06:00

## 2023-11-20 RX ADMIN — ROPINIROLE HYDROCHLORIDE 1 MG: 1 TABLET, FILM COATED ORAL at 13:31

## 2023-11-20 RX ADMIN — Medication 10 ML: at 20:59

## 2023-11-20 RX ADMIN — IPRATROPIUM BROMIDE AND ALBUTEROL SULFATE 1 DOSE: .5; 2.5 SOLUTION RESPIRATORY (INHALATION) at 14:08

## 2023-11-20 RX ADMIN — IPRATROPIUM BROMIDE AND ALBUTEROL SULFATE 1 DOSE: .5; 2.5 SOLUTION RESPIRATORY (INHALATION) at 00:48

## 2023-11-20 RX ADMIN — FENTANYL CITRATE 25 MCG: 0.05 INJECTION, SOLUTION INTRAMUSCULAR; INTRAVENOUS at 06:06

## 2023-11-20 RX ADMIN — SODIUM CHLORIDE, PRESERVATIVE FREE 10 ML: 5 INJECTION INTRAVENOUS at 06:00

## 2023-11-20 RX ADMIN — SEVELAMER CARBONATE 800 MG: 800 TABLET, FILM COATED ORAL at 17:36

## 2023-11-20 ASSESSMENT — PAIN DESCRIPTION - ONSET
ONSET: ON-GOING
ONSET: ON-GOING

## 2023-11-20 ASSESSMENT — PAIN SCALES - GENERAL
PAINLEVEL_OUTOF10: 0
PAINLEVEL_OUTOF10: 7
PAINLEVEL_OUTOF10: 7

## 2023-11-20 ASSESSMENT — PAIN DESCRIPTION - LOCATION
LOCATION: BACK
LOCATION: GENERALIZED

## 2023-11-20 ASSESSMENT — PAIN DESCRIPTION - DESCRIPTORS
DESCRIPTORS: ACHING;DISCOMFORT
DESCRIPTORS: ACHING;DISCOMFORT

## 2023-11-20 ASSESSMENT — PAIN DESCRIPTION - FREQUENCY
FREQUENCY: CONTINUOUS
FREQUENCY: CONTINUOUS

## 2023-11-20 ASSESSMENT — PAIN DESCRIPTION - PAIN TYPE
TYPE: CHRONIC PAIN
TYPE: CHRONIC PAIN

## 2023-11-20 NOTE — PROGRESS NOTES
Department of Internal Medicine  PN    PCP: Clint Blackwood,   Admitting Physician: Dr. Ambrocio Eli  Consultants:   Date of Service: 11/18/2023    CHIEF COMPLAINT:  sob    HISTORY OF PRESENT ILLNESS:    Patient is 74-year-old female presented to the ED with shortness of breath. Patient states she has chronic shortness of breath she is on 4 L of supplemental oxygen at baseline. However over the last 3 days she has been increasingly more short of breath. She does not admit to significant increase in cough or sputum production. He does she does have orthopnea. She does admit to increased fatigue and generalized body tenderness. She also admits to headache and neck pain. States that she has diminished appetite over the last few days due to sickness. 11/19/2023  Patient seen examined on [facility]. Case discussed with pulmonologist today. BUN/creatinine 40/6.7 potassium 5.1. Liver enzymes normal with. Patient had proBNP on admission greater than 70,000. WBC 1.4 with hemoglobin 8 and platelet count of 62. Temperature 97.3 with heart rate 66 and blood pressure 117/56. O2 sat 98% with the patient on BiPAP FiO2 60%. Chest x-ray on admission was unremarkable with a CTA showing no evidence of PE with atelectasis with this discrete area of atelectasis and suspected minimal groundglass opacity right upper lobe. With patient's procalcitonin increased to 0.45 we will start antibiotics with vancomycin and cefepime with the patient just finishing up doxycycline and Omnicef. Pulmonology to evaluate for possible HCAP versus patient respiratory status purely by volume overload and with her renal failure and hemodialysis. 11/20/2023  Patient seen and examined on [facility]. Patient is on the BiPAP feels better. Patient duration of breath with any activity. She denies any current chest or abdominal pain. Case discussed with pulmonology today. Electrolytes are stable with blood sugars ranging 161-239.   He is normal with a bruising. Extremities:   Denies any lower extremity swelling or edema. PHYSICAL EXAM: Abnormal findings noted  VITALS:  Vitals:    11/20/23 1130   BP: 132/71   Pulse: 62   Resp:    Temp:    SpO2:          CONSTITUTIONAL:    Awake, alert, cooperative, no apparent distress, and appears stated age    EYES:     EOMI, sclera clear, conjunctiva normal    ENT:    Normocephalic, atraumatic, External ears without lesions. NECK:    Supple, symmetrical, trachea midline,  no JVD    HEMATOLOGIC/LYMPHATICS:    No cervical lymphadenopathy and no supraclavicular lymphadenopathy    LUNGS:    Symmetric. No increased work of breathing, good air exchange, clear to auscultation bilaterally, no wheezes, rhonchi, or rales,     CARDIOVASCULAR:    Normal apical impulse, regular rate and rhythm, normal S1 and S2, no S3 or S4, and no murmur noted    ABDOMEN:    soft, non-distended,    MUSCULOSKELETAL:    There is no redness, warmth, or swelling of the joints. NEUROLOGIC:    Awake, alert, oriented to name, place and time. SKIN:    No bruising or bleeding. No redness, warmth, or swelling    EXTREMITIES:    Peripheral pulses present. No edema, cyanosis, or swelling.     LINES/CATHETERS     LABORATORY DATA:  CBC with Differential:    Lab Results   Component Value Date/Time    WBC 3.5 11/20/2023 05:59 AM    RBC 2.43 11/20/2023 05:59 AM    HGB 8.3 11/20/2023 05:59 AM    HCT 25.2 11/20/2023 05:59 AM    PLT 80 11/20/2023 05:59 AM    .7 11/20/2023 05:59 AM    MCH 34.2 11/20/2023 05:59 AM    MCHC 32.9 11/20/2023 05:59 AM    RDW 14.5 11/20/2023 05:59 AM    NRBC 1 10/30/2023 09:55 AM    SEGSPCT 45 07/24/2013 01:15 PM    LYMPHOPCT 5 11/20/2023 05:59 AM    MONOPCT 3 11/20/2023 05:59 AM    MYELOPCT 4 11/18/2023 07:17 PM    BASOPCT 0 11/20/2023 05:59 AM    MONOSABS 0.09 11/20/2023 05:59 AM    LYMPHSABS 0.18 11/20/2023 05:59 AM    EOSABS 0 11/20/2023 05:59 AM    BASOSABS 0 11/20/2023 05:59 AM     CMP:    Lab Results   Component

## 2023-11-20 NOTE — PROGRESS NOTES
Physical Therapy    Physical Therapy Initial Evaluation/Plan of Care    Room #:  8893/7604-13  Patient Name: Jesika Garcia  YOB: 1960  MRN: [id number]    Date of Service: 11/20/2023     Tentative placement recommendation: Home with Home Health Physical Therapy  Equipment recommendation: Wheelchair  18 inch  light wt , leg rest, o2 carrier, regular removable arms, foot rests, foam cushion,     Evaluating Physical Therapist: Aroldo Olsen, PT  #[id number]      Specific Provider Orders/Date/Referring Provider :    PT eval and treat  Start:  11/19/23 0500,   End:  11/19/23 0500,   ONE TIME,   Standing Count:  1 Occurrences,   R         Baldo, Ismail U, DO     Admitting Diagnosis:   Peripheral edema [R60.9]  Acute respiratory failure with hypoxia ([address]) [J96.01]    Admitted with    shortness of breath with 4 Liters of o2 via nasal cannula chronic  Pulmonary hypertension    Surgery: none  Visit Diagnoses         Codes    Peripheral edema     R60.9    Body aches     R52    COPD exacerbation ([address])     J44.1            Patient Active Problem List   Diagnosis    Encounter regarding vascular access for dialysis for ESRD ([address])    Stage 5 chronic kidney disease on chronic dialysis ([address])    Chest pain    Drug-induced tremor    Orthostatic hypotension    Syncope and collapse    Hyperkalemia    Severe pulmonary hypertension (HCC)    Severe obesity (BMI 35.0-35.9 with comorbidity) ([address])    Acute on chronic heart failure with preserved ejection fraction (HCC)    VHD (valvular heart disease)    Acute respiratory failure with hypoxia ([address])        ASSESSMENT of Current Deficits Patient exhibits decreased strength, balance, endurance, and pain    impairing functional mobility, transfers, gait , gait distance, and tolerance to activity are barriers to d/c and require skilled intervention to address concerns listed above to increase safety and independence at discharge.    Decreased strength, balance and endurance  increases Procedure Laterality Date    CARDIAC CATHETERIZATION  06/06/2023    [facility]    CHOLECYSTECTOMY      EYE SURGERY      Bilateral eye \"old blood taken out about 2 yes ago 2019\"    FRACTURE SURGERY      HYSTERECTOMY (CERVIX STATUS UNKNOWN)      LIVER TRANSPLANT  2015    SHOULDER SURGERY Right     SHUNT REVISION Left 02/04/2021    AV GRAFT LEFT ARM performed by Kathy Garcia MD at [address]:    Precautions: Up with assistance, Continuous Pulse Oximetry , and incentive spirometer, falls, alarm, and O2 ,  9 L hi derrick vs bipap    Social history: Patient lives with significant other in a townhouse  with  bed/bath are on 2nd level with 13 steps with rail  and 1 steps without rail   to enter home. Tub shower , grab bars    Recent rehab stay  1/2 bath on first floor and getting bed on first floor set up     Equipment owned: Rollator, Bedside commode, Shower chair, and O2,       AM-PAC Basic Mobility        AM-PAC Basic Mobility - Inpatient   How much help is needed turning from your back to your side while in a flat bed without using bedrails?: A Lot  How much help is needed moving from lying on your back to sitting on the side of a flat bed without using bedrails?: A Lot  How much help is needed moving to and from a bed to a chair?: A Little  How much help is needed standing up from a chair using your arms?: A Little  How much help is needed walking in hospital room?: A Little  How much help is needed climbing 3-5 steps with a railing?: A Lot  AM-PAC Inpatient Mobility Raw Score : 15  AM-PAC Inpatient T-Scale Score : 39.45  Mobility Inpatient CMS 0-100% Score: 57.7  Mobility Inpatient CMS G-Code Modifier : CK    Nursing cleared patient for PT evaluation. The admitting diagnosis and active problem list as listed above have been reviewed prior to the initiation of this evaluation.     OBJECTIVE:   Initial Evaluation  Date: 11/20/2023 Treatment Date:     Short Term/ Long Term   Goals

## 2023-11-20 NOTE — PROGRESS NOTES
Report received from nightshift RN. Patient resting in bed, arousable to voice, with complaints of chronic pain in neck/back but denies chest pain. Oriented x4. On BiPAP/10L HiFlo NC intermittently. Requesting to keep continuous BiPAP on for dialysis, dialysis RN called and notified. Vital signs reviewed. Labs and orders reviewed. Will assume care of patient.

## 2023-11-20 NOTE — PROGRESS NOTES
Pharmacy Consultation Note  (Antibiotic Dosing and Monitoring)    Initial consult date: 11/19/23  Consulting physician/provider: Jewel Lord DO  Drug: Vancomycin  Indication: Pneumonia (HAP)    Age/  Gender Height Weight IBW  Allergy Information   62 y.o./female 160 cm (5' 3\") 97.1 kg (214 lb)     Ideal body weight: 52.4 kg (115 lb 8.3 oz)  Adjusted ideal body weight: 70.5 kg (155 lb 6.1 oz)   Tape [adhesive tape]      Renal Function:  Recent Labs     11/18/23  1917 11/19/23  0531 11/20/23  0559   BUN 32* 40* 32*   CREATININE 6.1* 6.7* 4.8*         Intake/Output Summary (Last 24 hours) at 11/20/2023 1141  Last data filed at 11/20/2023 0851  Gross per 24 hour   Intake 520 ml   Output 2300 ml   Net -1780 ml         Vancomycin Monitoring:  Trough:  No results for input(s): \"VANCOTROUGH\" in the last 72 hours. Random:    Recent Labs     11/20/23  0559   VANCORANDOM 17.1       No results for input(s): \"BLOODCULT2\" in the last 72 hours. Historical Cultures:  No results found for: \"ORG\"  No results for input(s): \"BC\" in the last 72 hours. Recent vancomycin administrations                     vancomycin (VANCOCIN) 1,250 mg in sodium chloride 0.9 % 250 mL IVPB (mg) 1,250 mg New Bag 11/19/23 1648                   Assessment:  Patient is a 63 y.o. female who has been initiated on vancomycin  Estimated Creatinine Clearance: 14 mL/min (A) (based on SCr of 4.8 mg/dL (H)).   To dose vancomycin, pharmacy will be utilizing dosing based off of levels due to patient requiring hemodialysis  11/19: Patient received HD  11/20: Patient received additional HD session    Plan:  Vancomycin 1250 mg IV x 1 dose  Random vancomycin level tomorrow morning  Will continue to monitor renal function   Pharmacy to follow      Presley Homans, [facility] 11/20/2023 11:41 AM

## 2023-11-20 NOTE — CARE COORDINATION
SOCIAL WORK / DISCHARGE PLANNING:   Sw was unable to meet with pt to complete assessment. Out of room for HD earlier and PT in room completing assessment. Ronnie Duarte discharged home 10/30 with sig [facility], [facility] per medical record review. Has home O2 [employer] and [facility] [facility] [place] [facility]. Sw will follow, complete assessment/readmission next date.                Electronically signed by TYRESE Mariee on 11/20/2023 at 4:57 PM

## 2023-11-20 NOTE — PROGRESS NOTES
Date:2023  Patient Name: Chyna Johnson  MRN: [id number]  : 1960  ROOM #: 0630/0630-01    Occupational Therapy order received, chart reviewed and evaluation attempted this date. Patient is unavailable for OT evaluation due to off the floor for dialysis. Will attempt OT evaluation at a later time. Thank you.    Poly Nowak OTR/L #[id number]

## 2023-11-20 NOTE — PLAN OF CARE
Problem: Discharge Planning  Goal: Discharge to home or other facility with appropriate resources  11/20/2023 1103 by Ambreen Dunn RN  Outcome: Progressing  Flowsheets (Taken 11/20/2023 0700)  Discharge to home or other facility with appropriate resources:   Arrange for needed discharge resources and transportation as appropriate   Identify barriers to discharge with patient and caregiver   Identify discharge learning needs (meds, wound care, etc)   Refer to discharge planning if patient needs post-hospital services based on physician order or complex needs related to functional status, cognitive ability or social support system  11/20/2023 0407 by Rekha Atkins RN  Outcome: Progressing     Problem: Safety - Adult  Goal: Free from fall injury  11/20/2023 1103 by Ambreen Dunn RN  Outcome: Progressing  11/20/2023 0407 by Rekha Atkins RN  Outcome: Progressing     Problem: Chronic Conditions and Co-morbidities  Goal: Patient's chronic conditions and co-morbidity symptoms are monitored and maintained or improved  11/20/2023 1103 by Ambreen Dunn RN  Outcome: Progressing  Flowsheets (Taken 11/20/2023 0700)  Care Plan - Patient's Chronic Conditions and Co-Morbidity Symptoms are Monitored and Maintained or Improved: Monitor and assess patient's chronic conditions and comorbid symptoms for stability, deterioration, or improvement  11/20/2023 0407 by Rekha Atkins RN  Outcome: Progressing     Problem: Pain  Goal: Verbalizes/displays adequate comfort level or baseline comfort level  Outcome: Progressing     Problem: Skin/Tissue Integrity  Goal: Absence of new skin breakdown  Description: 1. Monitor for areas of redness and/or skin breakdown  2. Assess vascular access sites hourly  3. Every 4-6 hours minimum:  Change oxygen saturation probe site  4.   Every 4-6 hours:  If on nasal continuous positive airway pressure, respiratory therapy assess nares and determine need for appliance change or resting

## 2023-11-20 NOTE — PROGRESS NOTES
abdominal wall     Hypertension     Neuropathy       Past Surgical History:   Procedure Laterality Date    CARDIAC CATHETERIZATION  2023    [facility]    CHOLECYSTECTOMY      EYE SURGERY      Bilateral eye \"old blood taken out about 2 yes ago \"    FRACTURE SURGERY      HYSTERECTOMY (CERVIX STATUS UNKNOWN)      LIVER TRANSPLANT  2015    SHOULDER SURGERY Right     SHUNT REVISION Left 2021    AV GRAFT LEFT ARM performed by Kj Randle MD at [facility]         Family History:   No family history on file. Allergies:         Tape Danitza Dinning tape]    Social history:  Social History     Socioeconomic History    Marital status:       Spouse name: Not on file    Number of children: Not on file    Years of education: Not on file    Highest education level: Not on file   Occupational History    Not on file   Tobacco Use    Smoking status: Former     Packs/day: .5     Types: Cigarettes     Quit date: 2019     Years since quittin.8    Smokeless tobacco: Never   Vaping Use    Vaping Use: Never used   Substance and Sexual Activity    Alcohol use: No    Drug use: Never    Sexual activity: Not on file   Other Topics Concern    Not on file   Social History Narrative    Not on file     Social Determinants of Health     Financial Resource Strain: Not on file   Food Insecurity: No Food Insecurity (2023)    Hunger Vital Sign     Worried About Running Out of Food in the Last Year: Never true     [address] in the Last Year: Never true   Transportation Needs: No Transportation Needs (2023)    Transportation Problems ([facility])     In the past 12 months, has lack of reliable transportation kept you from medical appointments, meetings, work or from getting things needed for daily living?: Not on file   Physical Activity: Not on file   Stress: Not on file   Social Connections: Not on file   Intimate Partner Violence: Not on file   Housing Stability: Low Risk  (2023) mg, Oral, Q8H PRN **OR** ondansetron (ZOFRAN) injection 4 mg, 4 mg, IntraVENous, Q6H PRN, Baldo, Ismail U, DO    polyethylene glycol (GLYCOLAX) packet 17 g, 17 g, Oral, Daily PRN, Baldo, Ismail U, DO    acetaminophen (TYLENOL) tablet 650 mg, 650 mg, Oral, Q6H PRN **OR** acetaminophen (TYLENOL) suppository 650 mg, 650 mg, Rectal, Q6H PRN, Baldo, Ismail U, DO    ipratropium 0.5 mg-albuterol 2.5 mg (DUONEB) nebulizer solution 1 Dose, 1 Dose, Inhalation, Q4H RT, Baldo, Ismail U, DO, 1 Dose at 11/20/23 0405    aspirin chewable tablet 81 mg, 81 mg, Oral, Daily, Baldo, Ismail U, DO, 81 mg at 11/19/23 1325    citalopram (CELEXA) tablet 40 mg, 40 mg, Oral, Lunch, Baldo, Ismail U, DO, 40 mg at 11/19/23 1331    melatonin disintegrating tablet 5 mg, 5 mg, Oral, Nightly, Baldo, Ismail U, DO, 5 mg at 11/19/23 2159    midodrine (PROAMATINE) tablet 5 mg, 5 mg, Oral, TID WC, Baldo, Ismail U, DO, 5 mg at 11/19/23 1655    pantoprazole (PROTONIX) tablet 40 mg, 40 mg, Oral, Lunch, Baldo, Ismail U, DO, 40 mg at 11/19/23 1331    primidone (MYSOLINE) tablet 50 mg, 50 mg, Oral, Daily, Baldo, Ismail U, DO, 50 mg at 11/19/23 1325    rOPINIRole (REQUIP) tablet 1 mg, 1 mg, Oral, BID, Baldo, Ismail U, DO, 1 mg at 11/19/23 2159    sevelamer (RENVELA) tablet 800 mg, 800 mg, Oral, TID WC, Baldo, Ismail U, DO, 800 mg at 11/19/23 1655    tacrolimus (PROGRAF) capsule 3 mg, 3 mg, Oral, BID, Baldo, Ismail U, DO, 3 mg at 11/19/23 2449    [DISCONTINUED] arformoterol tartrate (BROVANA) nebulizer solution 15 mcg, 15 mcg, Nebulization, BID RT, 15 mcg at 11/19/23 6124 **AND** budesonide (PULMICORT) nebulizer suspension 500 mcg, 0.5 mg, Nebulization, BID RT, Baldo, Ismail U, DO, 500 mcg at 11/20/23 0405      Review of Systems:   General: denies weight gain, denies loss of appetite, fever, chills, night sweats.   HEENT: denies headaches, dizziness, head trauma, visual changes, eye pain, tinnitus, nosebleeds, hoarseness or throat pain

## 2023-11-21 LAB
ALBUMIN SERPL-MCNC: 3.7 G/DL (ref 3.5–5.2)
ALP SERPL-CCNC: 75 U/L (ref 35–104)
ALT SERPL-CCNC: 15 U/L (ref 0–32)
ANION GAP SERPL CALCULATED.3IONS-SCNC: 13 MMOL/L (ref 7–16)
AST SERPL-CCNC: 19 U/L (ref 0–31)
BASOPHILS # BLD: 0 K/UL (ref 0–0.2)
BASOPHILS NFR BLD: 0 % (ref 0–2)
BILIRUB SERPL-MCNC: 0.5 MG/DL (ref 0–1.2)
BUN SERPL-MCNC: 29 MG/DL (ref 6–23)
CALCIUM SERPL-MCNC: 8.9 MG/DL (ref 8.6–10.2)
CHLORIDE SERPL-SCNC: 91 MMOL/L (ref 98–107)
CO2 SERPL-SCNC: 29 MMOL/L (ref 22–29)
CREAT SERPL-MCNC: 3.8 MG/DL (ref 0.5–1)
DATE LAST DOSE: NORMAL
EOSINOPHIL # BLD: 0 K/UL (ref 0.05–0.5)
EOSINOPHILS RELATIVE PERCENT: 0 % (ref 0–6)
ERYTHROCYTE [DISTWIDTH] IN BLOOD BY AUTOMATED COUNT: 14.3 % (ref 11.5–15)
GFR SERPL CREATININE-BSD FRML MDRD: 13 ML/MIN/1.73M2
GLUCOSE SERPL-MCNC: 195 MG/DL (ref 74–99)
HCT VFR BLD AUTO: 25.6 % (ref 34–48)
HGB BLD-MCNC: 8.4 G/DL (ref 11.5–15.5)
LYMPHOCYTES NFR BLD: 0.08 K/UL (ref 1.5–4)
LYMPHOCYTES RELATIVE PERCENT: 2 % (ref 20–42)
MCH RBC QN AUTO: 33.7 PG (ref 26–35)
MCHC RBC AUTO-ENTMCNC: 32.8 G/DL (ref 32–34.5)
MCV RBC AUTO: 102.8 FL (ref 80–99.9)
MONOCYTES NFR BLD: 0.12 K/UL (ref 0.1–0.95)
MONOCYTES NFR BLD: 3 % (ref 2–12)
MYELOCYTES ABSOLUTE COUNT: 0.04 K/UL
MYELOCYTES: 1 %
NEUTROPHILS NFR BLD: 95 % (ref 43–80)
NEUTS SEG NFR BLD: 4.45 K/UL (ref 1.8–7.3)
PLATELET # BLD AUTO: 104 K/UL (ref 130–450)
PMV BLD AUTO: 10.4 FL (ref 7–12)
POTASSIUM SERPL-SCNC: 4.2 MMOL/L (ref 3.5–5)
PROT SERPL-MCNC: 6.9 G/DL (ref 6.4–8.3)
RBC # BLD AUTO: 2.49 M/UL (ref 3.5–5.5)
RBC # BLD: ABNORMAL 10*6/UL
SODIUM SERPL-SCNC: 133 MMOL/L (ref 132–146)
TME LAST DOSE: NORMAL H
VANCOMYCIN DOSE: NORMAL MG
VANCOMYCIN SERPL-MCNC: 25 UG/ML (ref 5–40)
WBC OTHER # BLD: 4.7 K/UL (ref 4.5–11.5)

## 2023-11-21 PROCEDURE — 36415 COLL VENOUS BLD VENIPUNCTURE: CPT

## 2023-11-21 PROCEDURE — 2580000003 HC RX 258: Performed by: INTERNAL MEDICINE

## 2023-11-21 PROCEDURE — 2700000000 HC OXYGEN THERAPY PER DAY

## 2023-11-21 PROCEDURE — 94640 AIRWAY INHALATION TREATMENT: CPT

## 2023-11-21 PROCEDURE — 6360000002 HC RX W HCPCS: Performed by: INTERNAL MEDICINE

## 2023-11-21 PROCEDURE — 94660 CPAP INITIATION&MGMT: CPT

## 2023-11-21 PROCEDURE — 6370000000 HC RX 637 (ALT 250 FOR IP): Performed by: INTERNAL MEDICINE

## 2023-11-21 PROCEDURE — 80053 COMPREHEN METABOLIC PANEL: CPT

## 2023-11-21 PROCEDURE — 90935 HEMODIALYSIS ONE EVALUATION: CPT

## 2023-11-21 PROCEDURE — 99233 SBSQ HOSP IP/OBS HIGH 50: CPT | Performed by: INTERNAL MEDICINE

## 2023-11-21 PROCEDURE — 2060000000 HC ICU INTERMEDIATE R&B

## 2023-11-21 PROCEDURE — 80202 ASSAY OF VANCOMYCIN: CPT

## 2023-11-21 PROCEDURE — 85025 COMPLETE CBC W/AUTO DIFF WBC: CPT

## 2023-11-21 RX ADMIN — SEVELAMER CARBONATE 800 MG: 800 TABLET, FILM COATED ORAL at 13:34

## 2023-11-21 RX ADMIN — CITALOPRAM HYDROBROMIDE 40 MG: 20 TABLET ORAL at 13:33

## 2023-11-21 RX ADMIN — FENTANYL CITRATE 25 MCG: 0.05 INJECTION, SOLUTION INTRAMUSCULAR; INTRAVENOUS at 04:15

## 2023-11-21 RX ADMIN — PRIMIDONE 50 MG: 50 TABLET ORAL at 13:34

## 2023-11-21 RX ADMIN — IPRATROPIUM BROMIDE AND ALBUTEROL SULFATE 1 DOSE: .5; 2.5 SOLUTION RESPIRATORY (INHALATION) at 01:37

## 2023-11-21 RX ADMIN — BUDESONIDE INHALATION 500 MCG: 0.5 SUSPENSION RESPIRATORY (INHALATION) at 06:03

## 2023-11-21 RX ADMIN — ASPIRIN 81 MG CHEWABLE TABLET 81 MG: 81 TABLET CHEWABLE at 13:34

## 2023-11-21 RX ADMIN — TACROLIMUS 3 MG: 1 CAPSULE ORAL at 13:35

## 2023-11-21 RX ADMIN — IPRATROPIUM BROMIDE AND ALBUTEROL SULFATE 1 DOSE: .5; 2.5 SOLUTION RESPIRATORY (INHALATION) at 06:03

## 2023-11-21 RX ADMIN — MIDODRINE HYDROCHLORIDE 5 MG: 5 TABLET ORAL at 08:17

## 2023-11-21 RX ADMIN — METHYLPREDNISOLONE SODIUM SUCCINATE 40 MG: 40 INJECTION, POWDER, FOR SOLUTION INTRAMUSCULAR; INTRAVENOUS at 05:53

## 2023-11-21 RX ADMIN — MIDODRINE HYDROCHLORIDE 5 MG: 5 TABLET ORAL at 17:15

## 2023-11-21 RX ADMIN — Medication 10 ML: at 20:46

## 2023-11-21 RX ADMIN — METHYLPREDNISOLONE SODIUM SUCCINATE 40 MG: 40 INJECTION, POWDER, FOR SOLUTION INTRAMUSCULAR; INTRAVENOUS at 20:46

## 2023-11-21 RX ADMIN — METHYLPREDNISOLONE SODIUM SUCCINATE 40 MG: 40 INJECTION, POWDER, FOR SOLUTION INTRAMUSCULAR; INTRAVENOUS at 13:34

## 2023-11-21 RX ADMIN — CEFEPIME 1000 MG: 1 INJECTION, POWDER, FOR SOLUTION INTRAMUSCULAR; INTRAVENOUS at 13:36

## 2023-11-21 RX ADMIN — SODIUM CHLORIDE, PRESERVATIVE FREE 10 ML: 5 INJECTION INTRAVENOUS at 04:15

## 2023-11-21 RX ADMIN — PANTOPRAZOLE SODIUM 40 MG: 40 TABLET, DELAYED RELEASE ORAL at 13:34

## 2023-11-21 RX ADMIN — IPRATROPIUM BROMIDE AND ALBUTEROL SULFATE 1 DOSE: .5; 2.5 SOLUTION RESPIRATORY (INHALATION) at 18:22

## 2023-11-21 RX ADMIN — Medication 5 MG: at 20:46

## 2023-11-21 RX ADMIN — ROPINIROLE HYDROCHLORIDE 1 MG: 1 TABLET, FILM COATED ORAL at 20:46

## 2023-11-21 RX ADMIN — FENTANYL CITRATE 25 MCG: 0.05 INJECTION, SOLUTION INTRAMUSCULAR; INTRAVENOUS at 13:34

## 2023-11-21 RX ADMIN — SODIUM CHLORIDE, PRESERVATIVE FREE 10 ML: 5 INJECTION INTRAVENOUS at 05:53

## 2023-11-21 RX ADMIN — TACROLIMUS 3 MG: 1 CAPSULE ORAL at 20:46

## 2023-11-21 RX ADMIN — ROPINIROLE HYDROCHLORIDE 1 MG: 1 TABLET, FILM COATED ORAL at 13:34

## 2023-11-21 RX ADMIN — BUDESONIDE INHALATION 500 MCG: 0.5 SUSPENSION RESPIRATORY (INHALATION) at 18:22

## 2023-11-21 RX ADMIN — FENTANYL CITRATE 25 MCG: 0.05 INJECTION, SOLUTION INTRAMUSCULAR; INTRAVENOUS at 20:45

## 2023-11-21 RX ADMIN — IPRATROPIUM BROMIDE AND ALBUTEROL SULFATE 1 DOSE: .5; 2.5 SOLUTION RESPIRATORY (INHALATION) at 21:43

## 2023-11-21 RX ADMIN — Medication 10 ML: at 08:17

## 2023-11-21 RX ADMIN — IPRATROPIUM BROMIDE AND ALBUTEROL SULFATE 1 DOSE: .5; 2.5 SOLUTION RESPIRATORY (INHALATION) at 13:37

## 2023-11-21 ASSESSMENT — PAIN DESCRIPTION - PAIN TYPE
TYPE: CHRONIC PAIN
TYPE: CHRONIC PAIN

## 2023-11-21 ASSESSMENT — PAIN DESCRIPTION - LOCATION
LOCATION: GENERALIZED
LOCATION: GENERALIZED

## 2023-11-21 ASSESSMENT — PAIN DESCRIPTION - ONSET
ONSET: ON-GOING
ONSET: ON-GOING

## 2023-11-21 ASSESSMENT — PAIN SCALES - GENERAL
PAINLEVEL_OUTOF10: 8
PAINLEVEL_OUTOF10: 8

## 2023-11-21 ASSESSMENT — PAIN DESCRIPTION - DESCRIPTORS
DESCRIPTORS: ACHING;DISCOMFORT
DESCRIPTORS: ACHING;DISCOMFORT

## 2023-11-21 ASSESSMENT — PAIN DESCRIPTION - FREQUENCY
FREQUENCY: CONTINUOUS
FREQUENCY: CONTINUOUS

## 2023-11-21 NOTE — PROGRESS NOTES
change in bowel habits or stools. Genito-Urinary:    Denies any urgency, frequency, hematuria. Voiding without difficulty. Musculoskeletal:   Denies joint pain, joint stiffness, joint swelling or muscle pain    Neurology:    Denies any headache or focal neurological deficits. No weakness or paresthesia. Derm:    Denies any rashes, ulcers, or excoriations. Denies bruising. Extremities:   Denies any lower extremity swelling or edema. PHYSICAL EXAM: Abnormal findings noted  VITALS:  Vitals:    11/21/23 1030   BP: (!) 152/58   Pulse: 60   Resp:    Temp:    SpO2:          CONSTITUTIONAL:    Awake, alert, cooperative, no apparent distress, and appears stated age    EYES:     EOMI, sclera clear, conjunctiva normal    ENT:    Normocephalic, atraumatic, External ears without lesions. NECK:    Supple, symmetrical, trachea midline,  no JVD    HEMATOLOGIC/LYMPHATICS:    No cervical lymphadenopathy and no supraclavicular lymphadenopathy    LUNGS:    Symmetric. No increased work of breathing, good air exchange, clear to auscultation bilaterally, no wheezes, rhonchi, or rales,     CARDIOVASCULAR:    Normal apical impulse, regular rate and rhythm, normal S1 and S2, no S3 or S4, and no murmur noted    ABDOMEN:    soft, non-distended,    MUSCULOSKELETAL:    There is no redness, warmth, or swelling of the joints. NEUROLOGIC:    Awake, alert, oriented to name, place and time. SKIN:    No bruising or bleeding. No redness, warmth, or swelling    EXTREMITIES:    Peripheral pulses present. No edema, cyanosis, or swelling.     LINES/CATHETERS     LABORATORY DATA:  CBC with Differential:    Lab Results   Component Value Date/Time    WBC 4.7 11/21/2023 05:28 AM    RBC 2.49 11/21/2023 05:28 AM    HGB 8.4 11/21/2023 05:28 AM    HCT 25.6 11/21/2023 05:28 AM     11/21/2023 05:28 AM    .8 11/21/2023 05:28 AM    MCH 33.7 11/21/2023 05:28 AM    MCHC 32.8 11/21/2023 05:28 AM    RDW 14.3 11/21/2023 05:28 AM    NRBC 1 10/30/2023 09:55 AM    SEGSPCT 45 07/24/2013 01:15 PM    LYMPHOPCT 2 11/21/2023 05:28 AM    MONOPCT 3 11/21/2023 05:28 AM    MYELOPCT 1 11/21/2023 05:28 AM    BASOPCT 0 11/21/2023 05:28 AM    MONOSABS 0.12 11/21/2023 05:28 AM    LYMPHSABS 0.08 11/21/2023 05:28 AM    EOSABS 0 11/21/2023 05:28 AM    BASOSABS 0 11/21/2023 05:28 AM     CMP:    Lab Results   Component Value Date/Time     11/21/2023 05:28 AM    K 4.2 11/21/2023 05:28 AM    K 4.2 02/04/2021 06:45 AM    CL 91 11/21/2023 05:28 AM    CO2 29 11/21/2023 05:28 AM    BUN 29 11/21/2023 05:28 AM    CREATININE 3.8 11/21/2023 05:28 AM    GFRAA 13 02/04/2021 06:45 AM    LABGLOM 13 11/21/2023 05:28 AM    GLUCOSE 195 11/21/2023 05:28 AM    PROT 6.9 11/21/2023 05:28 AM    LABALBU 3.7 11/21/2023 05:28 AM    CALCIUM 8.9 11/21/2023 05:28 AM    BILITOT 0.5 11/21/2023 05:28 AM    ALKPHOS 75 11/21/2023 05:28 AM    AST 19 11/21/2023 05:28 AM    ALT 15 11/21/2023 05:28 AM       ASSESSMENT/PLAN:   acute on chronic hypoxic respiratory failure  COPD exacerbation  Severe pulmonary hypertension  Possible pneumonitis  Groundglass opacities  Hx of dvt  ESRD on Dialysis   History of liver transplant  Splenomegaly  Chronic macrocytic anemia  Chronic diastolic congestive heart failure  Moderate tricuspid regurgitation  Mild aortic regurgitation  MAVERICK without use of CPAP  Hyperlipidemia  Hypertension  Peripheral neuropathy  Depression/anxiety  History of tobacco abuse        Patient presented with shortness of breath. Patient found to be acutely hypoxic on 2 L of supplemental oxygen currently. States that deep breaths increase the pain or she has pain with deep breaths. Initial work-up did not reveal any pneumonia. It did reveal pneumonitis. Patient given IV steroids and nebulized breathing treatments which will be continued. Fentanyl for pain. We will consider IV diuretics as well. Respiratory panel ordered.     Home medication reviewed  Consult

## 2023-11-21 NOTE — PROGRESS NOTES
Pharmacy Consultation Note  (Antibiotic Dosing and Monitoring)    Initial consult date: 11/19/23  Consulting physician/provider: Melissa Zimmerman DO  Drug: Vancomycin  Indication: Pneumonia (HAP)    Age/  Gender Height Weight IBW  Allergy Information   62 y.o./female 160 cm (5' 3\") 97.1 kg (214 lb)     Ideal body weight: 52.4 kg (115 lb 8.3 oz)  Adjusted ideal body weight: 69.8 kg (153 lb 15.5 oz)   Tape [adhesive tape]      Renal Function:  Recent Labs     11/19/23  0531 11/20/23  0559 11/21/23  0528   BUN 40* 32* 29*   CREATININE 6.7* 4.8* 3.8*         Intake/Output Summary (Last 24 hours) at 11/21/2023 1519  Last data filed at 11/21/2023 1341  Gross per 24 hour   Intake 1115.28 ml   Output 2800 ml   Net -1684.72 ml         Vancomycin Monitoring:  Trough:  No results for input(s): \"VANCOTROUGH\" in the last 72 hours. Random:    Recent Labs     11/20/23  0559 11/21/23  0528   VANCORANDOM 17.1 25.0         No results for input(s): \"BLOODCULT2\" in the last 72 hours. Historical Cultures:  No results found for: \"ORG\"  No results for input(s): \"BC\" in the last 72 hours. Recent vancomycin administrations                     vancomycin (VANCOCIN) 1,250 mg in sodium chloride 0.9 % 250 mL IVPB (mg) 1,250 mg New Bag 11/20/23 1354    vancomycin (VANCOCIN) 1,250 mg in sodium chloride 0.9 % 250 mL IVPB (mg) 1,250 mg New Bag 11/19/23 1648                       Assessment:  Patient is a 63 y.o. female who has been initiated on vancomycin  Estimated Creatinine Clearance: 17 mL/min (A) (based on SCr of 3.8 mg/dL (H)). To dose vancomycin, pharmacy will be utilizing dosing based off of levels due to patient requiring hemodialysis  11/19: Patient received HD  11/20: Random 17.1 Patient received additional HD session  11/21: Random 25.0.   Patient received HD session    Plan:  Hold vancomycin today  Random vancomycin level tomorrow morning  Will continue to monitor renal function   Pharmacy to follow      Marinette Slipper, [facility] 11/21/2023 3:19 PM

## 2023-11-21 NOTE — PROGRESS NOTES
Assessment and Plan  Patient is a 63 y.o. female with the following medical Problems:   Acute on chronic hypoxic respiratory failure requiring BiPAP. Acute pulmonary edema  Atelectasis  COPD with mild exacerbation  Suspected healthcare associated pneumonia  Pancytopenia  Severe secondary pulmonary hypertension  Morbid obesity  Obstructive sleep apnea on PAP  End-stage renal disease on hemodialysis  S/P liver transplant  Heart failure with preserved ejection fraction  Valvular heart disease  Former nicotine abuse  Hypertension  Dyslipidemia  Peripheral neuropathy  Depression  Anxiety  Splenomegaly with suspected hypersplenism    Plan of care:  Optimize volume status with hemodialysis  Low-dose Solu-Medrol for acute exacerbation of COPD  Scheduled DuoNeb and scheduled Pulmicort  Supplemental oxygen to keep sat 88 to 94%  Continue with cefepime and vancomycin. Bronchoscopy on November 22, 2023 since anesthesia prefers to do the procedure 4 hours after HD. Monitor fever curve and leukocytosis. Patient is pancytopenic. SCDs for DVT prophylaxis  Continue with immunosuppressive medication. We will consider Bactrim if respiratory status worsens. Incentive spirometer  GI prophylaxis  Strictly avoid benzodiazepine and opioids  Respiratory viral panel -ve. Cultures are negative today. History of Present Illness:   Patient is a 53-year-old woman with above-mentioned medical problems who is chronically on supplemental oxygen at 4 L/min. She was admitted on November 18, 2023 with progressive shortness of breath and productive cough of yellowish sputum. She was started on cefepime and vancomycin. Patient is chronically pancytopenic. Daily progress:  November 20, 2023: Patient continues to be on high flow oxygen. She continues to endorse exertional dyspnea and cough. She denies chest pain. She continues to be pancytopenic. November 21, 2023: Patient continues on high flow oxygen.   She underwent urinalysis

## 2023-11-21 NOTE — PROGRESS NOTES
Report received from nightshift RN. Patient awake and alert in bed, oriented x4, with no complaints of pain or discomfort at this time. PRN pain medication received at 0415, next dose available at 1015, patient states pain control is adequate. Vital signs reviewed. Patient on 8L HiFLO NC with use of BiPAP overnight. Labs and orders reviewed. Plan for patient to receive dialysis today per dialysis RN. Patient NPO pending bronchoscopy with pulmonology, consent signed in chart, orders for procedure not yet placed. Patient aware of plan of care. Will assume care of patient. No PMD at this time

## 2023-11-21 NOTE — CARE COORDINATION
Case Management Assessment  Initial Evaluation    Date/Time of Evaluation: 11/21/2023 4:08 PM  Assessment Completed by: TYRESE Neal    If patient is discharged prior to next notation, then this note serves as note for discharge by case management. Patient Name: Chato Escudero                   YOB: 1960  Diagnosis: Peripheral edema [R60.9]  Acute respiratory failure with hypoxia ([address]) [J96.01]                   Date / Time: 11/18/2023  5:59 PM    Patient Admission Status: Inpatient   Readmission Risk (Low < 19, Mod (19-27), High > 27): Readmission Risk Score: 23.8    Current PCP: Markell Parada., DO  PCP verified by CM? Yes    Chart Reviewed: Yes      History Provided by: Patient  Patient Orientation: Alert and Oriented    Patient Cognition: Alert    Hospitalization in the last 30 days (Readmission):  Yes    Readmission Assessment  Number of Days since last admission?: 8-30 days  Previous Disposition: Home with Home Health  Who is being Interviewed: Patient  What was the patient's/caregiver's perception as to why they think they needed to return back to the hospital?:  (couldnt breath)  Did you visit your Primary Care Physician after you left the hospital, before you returned this time?: Yes  Did you see a specialist, such as Cardiac, Pulmonary, Orthopedic Physician, etc. after you left the hospital?: Yes  Who advised the patient to return to the hospital?: Self-referral  Does the patient report anything that got in the way of taking their medications?: No  In our efforts to provide the best possible care to you and others like you, can you think of anything that we could have done to help you after you left the hospital the first time, so that you might not have needed to return so soon?: Other (Comment) (n/a)    .   Advance Directives:      Code Status: Full Code   Patient's Primary Decision Maker is: Legal Next of Kin    Primary Decision Maker: Magdalena Eaton - Parent -

## 2023-11-21 NOTE — ACP (ADVANCE CARE PLANNING)
Advance Care Planning   Healthcare Decision Maker:    Primary Decision Maker: Chayitoelliottmagy Aceves [facility] [phone]    Click here to complete Healthcare Decision Makers including selection of the Healthcare Decision Maker Relationship (ie \"Primary\"). Today we documented Decision Maker(s) consistent with Legal Next of Kin hierarchy.

## 2023-11-21 NOTE — PROGRESS NOTES
Physical Therapy  Physical Therapy    Room #:   1727/8406-04    Date: 2023       Patient Name: Giovana Bella  : 1960      MRN: [id number]     Patient unavailable for physical therapy treatment due to off floor at dialysis. Will attempt PT treatment at a later time. Thank you. Verona Acosta.  [facility]  LIC # [id number]

## 2023-11-21 NOTE — PLAN OF CARE
Problem: Discharge Planning  Goal: Discharge to home or other facility with appropriate resources  11/21/2023 1120 by Jerel Mix RN  Outcome: Progressing  Flowsheets (Taken 11/21/2023 0700)  Discharge to home or other facility with appropriate resources:   Identify barriers to discharge with patient and caregiver   Arrange for needed discharge resources and transportation as appropriate   Identify discharge learning needs (meds, wound care, etc)   Refer to discharge planning if patient needs post-hospital services based on physician order or complex needs related to functional status, cognitive ability or social support system  11/21/2023 0446 by Columba Santoyo RN  Outcome: Progressing     Problem: Safety - Adult  Goal: Free from fall injury  11/21/2023 1120 by Jerel Mix RN  Outcome: Progressing  11/21/2023 0446 by Columba Santoyo RN  Outcome: Progressing     Problem: Chronic Conditions and Co-morbidities  Goal: Patient's chronic conditions and co-morbidity symptoms are monitored and maintained or improved  11/21/2023 1120 by Jerel Mix RN  Outcome: Progressing  Flowsheets (Taken 11/21/2023 0700)  Care Plan - Patient's Chronic Conditions and Co-Morbidity Symptoms are Monitored and Maintained or Improved: Monitor and assess patient's chronic conditions and comorbid symptoms for stability, deterioration, or improvement  11/21/2023 0446 by Columba Santoyo RN  Outcome: Progressing     Problem: Pain  Goal: Verbalizes/displays adequate comfort level or baseline comfort level  11/21/2023 1120 by Jerel Mix RN  Outcome: Progressing  11/21/2023 0446 by Columba Santoyo RN  Outcome: Progressing     Problem: Skin/Tissue Integrity  Goal: Absence of new skin breakdown  Description: 1. Monitor for areas of redness and/or skin breakdown  2. Assess vascular access sites hourly  3. Every 4-6 hours minimum:  Change oxygen saturation probe site  4.   Every 4-6 hours:  If on nasal continuous positive airway pressure, respiratory therapy assess nares and determine need for appliance change or resting period.   11/21/2023 1120 by Frandy Johnson RN  Outcome: Progressing  11/21/2023 0446 by Angel Nguyen RN  Outcome: Progressing

## 2023-11-21 NOTE — PROGRESS NOTES
Date:2023  Patient Name: Noreen Bustos  MRN: [id number]  : 1960  ROOM #: 0630/0630-01    Occupational Therapy order received, chart reviewed and evaluation attempted this date. Patient is unavailable for OT evaluation due to off the floor this AM.     Will attempt OT evaluation at a later time. Thank you.    Eugene Faith OTR/L #[id number]

## 2023-11-21 NOTE — PROGRESS NOTES
[facility] MD Alverto Gutierrez, MD Bryson Griggs, CNP   Katie Oden, ANP  Jarocho Espinal, NORY  Progress Note    11/21/2023    SUBJECTIVE:   11/20: Seen while on dialysis. Tolerating therapy without issues. BP is stable. She is on the BiPAP. Denies dyspnea. Appetite is good. Would like HD again tomorrow. 11.21 seen in HD this the 2th treatment in a row . Tolerating UF BP stable on BIPAP    PROBLEM LIST:    Principal Problem:    Acute respiratory failure with hypoxia (HCC)  Resolved Problems:    * No resolved hospital problems.  *         DIET:    Diet NPO     MEDS (scheduled):    vancomycin (VANCOCIN) intermittent dosing (placeholder)   Other RX Placeholder    methylPREDNISolone  40 mg IntraVENous Q8H    linaclotide  290 mcg Oral QAM AC    epoetin maggi-epbx  30 Units/kg SubCUTAneous Once per day on Mon Wed Fri    cefepime  1,000 mg IntraVENous Q24H    entecavir  0.5 mg Oral Every Other Day    acetaminophen  1,000 mg Oral Once    sodium chloride flush  5-40 mL IntraVENous 2 times per day    ipratropium 0.5 mg-albuterol 2.5 mg  1 Dose Inhalation Q4H RT    aspirin  81 mg Oral Daily    citalopram  40 mg Oral Lunch    melatonin  5 mg Oral Nightly    midodrine  5 mg Oral TID WC    pantoprazole  40 mg Oral Lunch    primidone  50 mg Oral Daily    rOPINIRole  1 mg Oral BID    sevelamer  800 mg Oral TID WC    tacrolimus  3 mg Oral BID    budesonide  0.5 mg Nebulization BID RT       MEDS (infusions):   dextrose      sodium chloride         MEDS (prn):  fentanNYL, glucose, dextrose bolus **OR** dextrose bolus, glucagon (rDNA), dextrose, sodium chloride flush, sodium chloride, ondansetron **OR** ondansetron, polyethylene glycol, acetaminophen **OR** acetaminophen    PHYSICAL EXAM:     Patient Vitals for the past 24 hrs:   BP Temp Temp src Pulse Resp SpO2 Weight   11/21/23 1100 (!) 154/58 -- -- 60 -- -- --   11/21/23 1030 (!) 152/58 -- -- 60 -- -- --   11/21/23 1000 11/19/23  0531 11/20/23  0559 11/21/23  0528    131* 134 133   K 4.1 5.1* 4.7 4.2   CL 92* 91* 92* 91*   CO2 30* 27 28 29   MG 2.0 2.2 2.3  --    PHOS  --  5.9* 5.1*  --    BUN 32* 40* 32* 29*   CREATININE 6.1* 6.7* 4.8* 3.8*   ALT 14 13 14 15   AST 24 22 20 19   BILITOT 0.7 0.5 0.4 0.5   ALKPHOS 86 84 80 75         No results found for: \"LABPROT\"    Assessment  ESRD due to diabetic nephropathy, renal microvascular atherosclerotic disease  Anemia due to ESRD  Secondary hyperparathyroidism due to ESRD  Hypertension  Diabetic gastroparesis  Chronic constipation and usually takes Senokot 2 tablets twice a day Dulcolax as needed at home. Notes that Colace does not seem to help much.   Chronic respiratory insufficiency on 4 L of normal O2 per nasal cannula at baseline  Pancytopenia, possibly secondary to methotrexate and DMARDs in setting treatment for RA     Recommendations  Continue IHD support for solute and volume clearance as per outpatient orders and dry weight  HD today   Evaluate need for additional HD/UF in am   Continue Retacrit per protocol  Continue sevelamer - PO4 5.1  Continue other aspects of renal management  Dulcolax p.o. as needed  Follow labs, BP  Continue supportive care        Electronically signed by Dorothy Woodall MD on 11/21/2023 at 12:08 PM

## 2023-11-22 ENCOUNTER — ANESTHESIA EVENT (OUTPATIENT)
Dept: ENDOSCOPY | Age: 63
End: 2023-11-22
Payer: MEDICARE

## 2023-11-22 ENCOUNTER — ANESTHESIA (OUTPATIENT)
Dept: ENDOSCOPY | Age: 63
End: 2023-11-22
Payer: MEDICARE

## 2023-11-22 LAB
ALBUMIN SERPL-MCNC: 3.8 G/DL (ref 3.5–5.2)
ALP SERPL-CCNC: 81 U/L (ref 35–104)
ALT SERPL-CCNC: 27 U/L (ref 0–32)
ANION GAP SERPL CALCULATED.3IONS-SCNC: 15 MMOL/L (ref 7–16)
AST SERPL-CCNC: 34 U/L (ref 0–31)
BASOPHILS # BLD: 0 K/UL (ref 0–0.2)
BASOPHILS NFR BLD: 0 % (ref 0–2)
BILIRUB SERPL-MCNC: 0.6 MG/DL (ref 0–1.2)
BUN SERPL-MCNC: 34 MG/DL (ref 6–23)
CALCIUM SERPL-MCNC: 9.1 MG/DL (ref 8.6–10.2)
CHLORIDE SERPL-SCNC: 92 MMOL/L (ref 98–107)
CO2 SERPL-SCNC: 27 MMOL/L (ref 22–29)
CREAT SERPL-MCNC: 3.5 MG/DL (ref 0.5–1)
DATE LAST DOSE: NORMAL
EOSINOPHIL # BLD: 0 K/UL (ref 0.05–0.5)
EOSINOPHILS RELATIVE PERCENT: 0 % (ref 0–6)
ERYTHROCYTE [DISTWIDTH] IN BLOOD BY AUTOMATED COUNT: 14.6 % (ref 11.5–15)
GFR SERPL CREATININE-BSD FRML MDRD: 14 ML/MIN/1.73M2
GLUCOSE SERPL-MCNC: 169 MG/DL (ref 74–99)
HCT VFR BLD AUTO: 27.5 % (ref 34–48)
HGB BLD-MCNC: 9.3 G/DL (ref 11.5–15.5)
LYMPHOCYTES NFR BLD: 0.34 K/UL (ref 1.5–4)
LYMPHOCYTES RELATIVE PERCENT: 6 % (ref 20–42)
MCH RBC QN AUTO: 34.3 PG (ref 26–35)
MCHC RBC AUTO-ENTMCNC: 33.8 G/DL (ref 32–34.5)
MCV RBC AUTO: 101.5 FL (ref 80–99.9)
MONOCYTES NFR BLD: 0.15 K/UL (ref 0.1–0.95)
MONOCYTES NFR BLD: 3 % (ref 2–12)
NEUTROPHILS NFR BLD: 91 % (ref 43–80)
NEUTS SEG NFR BLD: 5.11 K/UL (ref 1.8–7.3)
NUCLEATED RED BLOOD CELLS: 1 PER 100 WBC
PLATELET # BLD AUTO: 124 K/UL (ref 130–450)
PMV BLD AUTO: 10.4 FL (ref 7–12)
POTASSIUM SERPL-SCNC: 3.8 MMOL/L (ref 3.5–5)
PROT SERPL-MCNC: 7.1 G/DL (ref 6.4–8.3)
RBC # BLD AUTO: 2.71 M/UL (ref 3.5–5.5)
RBC # BLD: ABNORMAL 10*6/UL
SODIUM SERPL-SCNC: 134 MMOL/L (ref 132–146)
TME LAST DOSE: NORMAL H
VANCOMYCIN DOSE: NORMAL MG
VANCOMYCIN SERPL-MCNC: 19.1 UG/ML (ref 5–40)
WBC OTHER # BLD: 5.6 K/UL (ref 4.5–11.5)

## 2023-11-22 PROCEDURE — 87081 CULTURE SCREEN ONLY: CPT

## 2023-11-22 PROCEDURE — 0B9D8ZX DRAINAGE OF RIGHT MIDDLE LUNG LOBE, VIA NATURAL OR ARTIFICIAL OPENING ENDOSCOPIC, DIAGNOSTIC: ICD-10-PCS | Performed by: INTERNAL MEDICINE

## 2023-11-22 PROCEDURE — 87102 FUNGUS ISOLATION CULTURE: CPT

## 2023-11-22 PROCEDURE — 87070 CULTURE OTHR SPECIMN AEROBIC: CPT

## 2023-11-22 PROCEDURE — 85025 COMPLETE CBC W/AUTO DIFF WBC: CPT

## 2023-11-22 PROCEDURE — 2500000003 HC RX 250 WO HCPCS: Performed by: NURSE ANESTHETIST, CERTIFIED REGISTERED

## 2023-11-22 PROCEDURE — 2580000003 HC RX 258: Performed by: INTERNAL MEDICINE

## 2023-11-22 PROCEDURE — 6360000002 HC RX W HCPCS: Performed by: INTERNAL MEDICINE

## 2023-11-22 PROCEDURE — 6360000002 HC RX W HCPCS: Performed by: NURSE ANESTHETIST, CERTIFIED REGISTERED

## 2023-11-22 PROCEDURE — 99233 SBSQ HOSP IP/OBS HIGH 50: CPT | Performed by: INTERNAL MEDICINE

## 2023-11-22 PROCEDURE — 2580000003 HC RX 258: Performed by: NURSE ANESTHETIST, CERTIFIED REGISTERED

## 2023-11-22 PROCEDURE — 3609010800 HC BRONCHOSCOPY ALVEOLAR LAVAGE: Performed by: INTERNAL MEDICINE

## 2023-11-22 PROCEDURE — 36415 COLL VENOUS BLD VENIPUNCTURE: CPT

## 2023-11-22 PROCEDURE — 6370000000 HC RX 637 (ALT 250 FOR IP): Performed by: INTERNAL MEDICINE

## 2023-11-22 PROCEDURE — 0B9G8ZX DRAINAGE OF LEFT UPPER LUNG LOBE, VIA NATURAL OR ARTIFICIAL OPENING ENDOSCOPIC, DIAGNOSTIC: ICD-10-PCS | Performed by: INTERNAL MEDICINE

## 2023-11-22 PROCEDURE — 94660 CPAP INITIATION&MGMT: CPT

## 2023-11-22 PROCEDURE — 7100000011 HC PHASE II RECOVERY - ADDTL 15 MIN: Performed by: INTERNAL MEDICINE

## 2023-11-22 PROCEDURE — 87116 MYCOBACTERIA CULTURE: CPT

## 2023-11-22 PROCEDURE — 87281 PNEUMOCYSTIS CARINII AG IF: CPT

## 2023-11-22 PROCEDURE — 97165 OT EVAL LOW COMPLEX 30 MIN: CPT | Performed by: OCCUPATIONAL THERAPIST

## 2023-11-22 PROCEDURE — 2060000000 HC ICU INTERMEDIATE R&B

## 2023-11-22 PROCEDURE — 87206 SMEAR FLUORESCENT/ACID STAI: CPT

## 2023-11-22 PROCEDURE — 3700000000 HC ANESTHESIA ATTENDED CARE: Performed by: INTERNAL MEDICINE

## 2023-11-22 PROCEDURE — 2709999900 HC NON-CHARGEABLE SUPPLY: Performed by: INTERNAL MEDICINE

## 2023-11-22 PROCEDURE — 80202 ASSAY OF VANCOMYCIN: CPT

## 2023-11-22 PROCEDURE — 2700000000 HC OXYGEN THERAPY PER DAY

## 2023-11-22 PROCEDURE — 97530 THERAPEUTIC ACTIVITIES: CPT | Performed by: OCCUPATIONAL THERAPIST

## 2023-11-22 PROCEDURE — 7100000010 HC PHASE II RECOVERY - FIRST 15 MIN: Performed by: INTERNAL MEDICINE

## 2023-11-22 PROCEDURE — 80053 COMPREHEN METABOLIC PANEL: CPT

## 2023-11-22 PROCEDURE — 87205 SMEAR GRAM STAIN: CPT

## 2023-11-22 PROCEDURE — 94640 AIRWAY INHALATION TREATMENT: CPT

## 2023-11-22 PROCEDURE — 3700000001 HC ADD 15 MINUTES (ANESTHESIA): Performed by: INTERNAL MEDICINE

## 2023-11-22 PROCEDURE — 31624 DX BRONCHOSCOPE/LAVAGE: CPT | Performed by: INTERNAL MEDICINE

## 2023-11-22 PROCEDURE — 0B9F8ZX DRAINAGE OF RIGHT LOWER LUNG LOBE, VIA NATURAL OR ARTIFICIAL OPENING ENDOSCOPIC, DIAGNOSTIC: ICD-10-PCS | Performed by: INTERNAL MEDICINE

## 2023-11-22 RX ORDER — ETOMIDATE 2 MG/ML
INJECTION INTRAVENOUS PRN
Status: DISCONTINUED | OUTPATIENT
Start: 2023-11-22 | End: 2023-11-22 | Stop reason: SDUPTHER

## 2023-11-22 RX ORDER — IPRATROPIUM BROMIDE AND ALBUTEROL SULFATE 2.5; .5 MG/3ML; MG/3ML
1 SOLUTION RESPIRATORY (INHALATION)
Status: DISCONTINUED | OUTPATIENT
Start: 2023-11-23 | End: 2023-11-28 | Stop reason: HOSPADM

## 2023-11-22 RX ORDER — SODIUM CHLORIDE 9 MG/ML
INJECTION, SOLUTION INTRAVENOUS CONTINUOUS PRN
Status: DISCONTINUED | OUTPATIENT
Start: 2023-11-22 | End: 2023-11-22 | Stop reason: SDUPTHER

## 2023-11-22 RX ORDER — PROPOFOL 10 MG/ML
INJECTION, EMULSION INTRAVENOUS PRN
Status: DISCONTINUED | OUTPATIENT
Start: 2023-11-22 | End: 2023-11-22 | Stop reason: SDUPTHER

## 2023-11-22 RX ADMIN — IPRATROPIUM BROMIDE AND ALBUTEROL SULFATE 1 DOSE: .5; 2.5 SOLUTION RESPIRATORY (INHALATION) at 06:29

## 2023-11-22 RX ADMIN — IPRATROPIUM BROMIDE AND ALBUTEROL SULFATE 1 DOSE: .5; 2.5 SOLUTION RESPIRATORY (INHALATION) at 15:35

## 2023-11-22 RX ADMIN — IPRATROPIUM BROMIDE AND ALBUTEROL SULFATE 1 DOSE: .5; 2.5 SOLUTION RESPIRATORY (INHALATION) at 01:54

## 2023-11-22 RX ADMIN — ASPIRIN 81 MG CHEWABLE TABLET 81 MG: 81 TABLET CHEWABLE at 08:40

## 2023-11-22 RX ADMIN — Medication 10 ML: at 20:00

## 2023-11-22 RX ADMIN — CITALOPRAM HYDROBROMIDE 40 MG: 20 TABLET ORAL at 13:59

## 2023-11-22 RX ADMIN — PROPOFOL 40 MG: 10 INJECTION, EMULSION INTRAVENOUS at 12:40

## 2023-11-22 RX ADMIN — ROPINIROLE HYDROCHLORIDE 1 MG: 1 TABLET, FILM COATED ORAL at 20:00

## 2023-11-22 RX ADMIN — EPOETIN ALFA-EPBX 3000 UNITS: 3000 INJECTION, SOLUTION INTRAVENOUS; SUBCUTANEOUS at 21:44

## 2023-11-22 RX ADMIN — SEVELAMER CARBONATE 800 MG: 800 TABLET, FILM COATED ORAL at 17:44

## 2023-11-22 RX ADMIN — ROPINIROLE HYDROCHLORIDE 1 MG: 1 TABLET, FILM COATED ORAL at 08:40

## 2023-11-22 RX ADMIN — SODIUM CHLORIDE: 900 INJECTION, SOLUTION INTRAVENOUS at 12:30

## 2023-11-22 RX ADMIN — ETOMIDATE 20 MG: 2 INJECTION, SOLUTION INTRAVENOUS at 12:40

## 2023-11-22 RX ADMIN — BUDESONIDE INHALATION 500 MCG: 0.5 SUSPENSION RESPIRATORY (INHALATION) at 06:29

## 2023-11-22 RX ADMIN — ENTECAVIR 0.5 MG: 0.5 TABLET ORAL at 08:40

## 2023-11-22 RX ADMIN — METHYLPREDNISOLONE SODIUM SUCCINATE 40 MG: 40 INJECTION, POWDER, FOR SOLUTION INTRAMUSCULAR; INTRAVENOUS at 20:00

## 2023-11-22 RX ADMIN — IPRATROPIUM BROMIDE AND ALBUTEROL SULFATE 1 DOSE: .5; 2.5 SOLUTION RESPIRATORY (INHALATION) at 09:40

## 2023-11-22 RX ADMIN — PRIMIDONE 50 MG: 50 TABLET ORAL at 08:40

## 2023-11-22 RX ADMIN — IPRATROPIUM BROMIDE AND ALBUTEROL SULFATE 1 DOSE: .5; 2.5 SOLUTION RESPIRATORY (INHALATION) at 19:24

## 2023-11-22 RX ADMIN — BUDESONIDE INHALATION 500 MCG: 0.5 SUSPENSION RESPIRATORY (INHALATION) at 19:25

## 2023-11-22 RX ADMIN — PANTOPRAZOLE SODIUM 40 MG: 40 TABLET, DELAYED RELEASE ORAL at 14:00

## 2023-11-22 RX ADMIN — SEVELAMER CARBONATE 800 MG: 800 TABLET, FILM COATED ORAL at 14:00

## 2023-11-22 RX ADMIN — METHYLPREDNISOLONE SODIUM SUCCINATE 40 MG: 40 INJECTION, POWDER, FOR SOLUTION INTRAMUSCULAR; INTRAVENOUS at 14:00

## 2023-11-22 RX ADMIN — MIDODRINE HYDROCHLORIDE 5 MG: 5 TABLET ORAL at 17:53

## 2023-11-22 RX ADMIN — SODIUM CHLORIDE, PRESERVATIVE FREE 10 ML: 5 INJECTION INTRAVENOUS at 06:20

## 2023-11-22 RX ADMIN — TACROLIMUS 3 MG: 1 CAPSULE ORAL at 20:00

## 2023-11-22 RX ADMIN — METHYLPREDNISOLONE SODIUM SUCCINATE 40 MG: 40 INJECTION, POWDER, FOR SOLUTION INTRAMUSCULAR; INTRAVENOUS at 06:20

## 2023-11-22 RX ADMIN — Medication 10 ML: at 08:42

## 2023-11-22 RX ADMIN — Medication 5 MG: at 20:00

## 2023-11-22 RX ADMIN — TACROLIMUS 3 MG: 1 CAPSULE ORAL at 08:40

## 2023-11-22 RX ADMIN — CEFEPIME 1000 MG: 1 INJECTION, POWDER, FOR SOLUTION INTRAMUSCULAR; INTRAVENOUS at 14:12

## 2023-11-22 ASSESSMENT — COPD QUESTIONNAIRES: CAT_SEVERITY: MILD

## 2023-11-22 NOTE — PROGRESS NOTES
Pharmacy Consultation Note  (Antibiotic Dosing and Monitoring)    Initial consult date: 11/19/23  Consulting physician/provider: Marcos Alejandra DO  Drug: Vancomycin  Indication: Pneumonia (HAP)    Age/  Gender Height Weight IBW  Allergy Information   62 y.o./female 160 cm (5' 3\") 97.1 kg (214 lb)     Ideal body weight: 52.4 kg (115 lb 8.3 oz)  Adjusted ideal body weight: 69.8 kg (153 lb 15.5 oz)   Tape [adhesive tape]      Renal Function:  Recent Labs     11/20/23  0559 11/21/23  0528 11/22/23  0645   BUN 32* 29* 34*   CREATININE 4.8* 3.8* 3.5*         Intake/Output Summary (Last 24 hours) at 11/22/2023 1047  Last data filed at 11/22/2023 0842  Gross per 24 hour   Intake 420 ml   Output 2800 ml   Net -2380 ml         Vancomycin Monitoring:  Trough:  No results for input(s): \"VANCOTROUGH\" in the last 72 hours. Random:    Recent Labs     11/20/23  0559 11/21/23  0528 11/22/23  0645   VANCORANDOM 17.1 25.0 19.1         No results for input(s): \"BLOODCULT2\" in the last 72 hours. Historical Cultures:  No results found for: \"ORG\"  No results for input(s): \"BC\" in the last 72 hours. Recent vancomycin administrations                     vancomycin (VANCOCIN) 1,250 mg in sodium chloride 0.9 % 250 mL IVPB (mg) 1,250 mg New Bag 11/20/23 1354    vancomycin (VANCOCIN) 1,250 mg in sodium chloride 0.9 % 250 mL IVPB (mg) 1,250 mg New Bag 11/19/23 1648             Assessment:  Patient is a 63 y.o. female who has been initiated on vancomycin  Estimated Creatinine Clearance: 19 mL/min (A) (based on SCr of 3.5 mg/dL (H)). To dose vancomycin, pharmacy will be utilizing dosing based off of levels due to patient requiring hemodialysis  11/19: Patient received HD  11/20: Random 17.1 Patient received additional HD session  11/21: Random 25.0. Patient received HD session  11/22: Random AM level = 19.1 mcg/mL.  HD cancelled for today for bronch    Plan:  Hold vancomycin today  Re-dose after next HD  Will continue to monitor renal function   Pharmacy to follow    Mora Diamond PharmD, BCPS 11/22/2023 10:48 AM   Ext: [phone]

## 2023-11-22 NOTE — OP NOTE
Operative Note      Patient: Chato Escudero  YOB: 1960  MRN: [id number]    Date of Procedure: 11/22/2023    Pre-Op Diagnosis Codes:     * Pneumonia due to infectious organism, unspecified laterality, unspecified part of lung [J18.9]    Post-Op Diagnosis: Same       Procedure(s):  BRONCHOSCOPY    Surgeon(s):  Gisel San MD    Assistant:   Surgical Assistant: Tram Hart RN    Anesthesia: Monitor Anesthesia Care    Estimated Blood Loss (mL): Minimal    Complications: None    Specimens:   ID Type Source Tests Collected by Time Destination   1 : Left upper  BAL for Micro and C&S Respiratory BAL- Bronch. Lavage CULTURE, FUNGUS, GRAM STAIN, CULTURE WITH SMEAR, ACID FAST BACILLIUS, PNEUMOCYSTIS STAIN, CULTURE, RESPIRATORY, SMEAR FUNGUS Gisel San MD 11/22/2023 1233    2 : Right Middle Lobe BAL for Micro Respiratory BAL- Bronch. Lavage CULTURE, FUNGUS, GRAM STAIN, CULTURE WITH SMEAR, ACID FAST BACILLIUS, PNEUMOCYSTIS STAIN, CULTURE, RESPIRATORY, SMEAR FUNGUS Gisel San MD 11/22/2023 1240    3 : Right Lower lobe BAL for micro Respiratory BAL- Bronch. Lavage CULTURE, FUNGUS, GRAM STAIN, CULTURE WITH SMEAR, ACID FAST BACILLIUS, PNEUMOCYSTIS STAIN, CULTURE, RESPIRATORY, SMEAR FUNGUS Bailey Buckley MD 11/22/2023 1243        Implants:  * No implants in log *      Drains: * No LDAs found *    Findings: Extensive mucous plugging        Detailed Description of Procedure: Therapeutic/Diagnostic Bronchoscopy Procedure Note    Indication:  Pneumonia    Time Out:  Completed immediately prior to the start of the procedure which included verification of the correct patient, correct site and agreement on the procedure to be done. Consent:  The indications, risks, benefits, alternatives to the procedure were explained to the patient/surrogate decision maker and their questions answered.  Consent was obtained to proceed with the

## 2023-11-22 NOTE — PROGRESS NOTES
[facility]  [address]         Date:2023                                                   Patient Name: Jesika Garcia     MRN: [id number]     : 1960     Room: [address] [place] [address]       Evaluating OT: Larey Schirmer, PEYMANR/L; [id number]       Referring Provider and Orders/Date:   OT eval and treat  Start:  23 0500,   End:  23 0500,   ONE TIME,   Standing Count:  1 Occurrences,   R         Baldo, Ismail U, DO Acknowledge      Recommended placement: home with [facility]       Diagnosis:   1. Acute respiratory failure with hypoxia ([address])    2. Peripheral edema    3. Body aches    4. Chest pain, unspecified type    5.  COPD exacerbation [facility])         Surgery: : BRONCHOSCOPY scheduled for today     Pertinent Medical History:        Past Medical History:   Diagnosis Date    Arthritis     Cirrhosis of liver ([address])     Depression     Eye abnormalities     blood behid retina    Hernia of abdominal wall     Hypertension     Neuropathy           Past Surgical History:   Procedure Laterality Date    CARDIAC CATHETERIZATION  2023    [place]    CHOLECYSTECTOMY      EYE SURGERY      Bilateral eye \"old blood taken out about 2 yes ago 2019\"    FRACTURE SURGERY      HYSTERECTOMY (CERVIX STATUS UNKNOWN)      LIVER TRANSPLANT      SHOULDER SURGERY Right     SHUNT REVISION Left 2021    AV GRAFT LEFT ARM performed by Christine Campbell MD at [address] [place] [address]         Precautions:  Fall Risk, HD, 6L        Assessment of current deficits     [x] Functional mobility  [x]ADLs  [x] Strength               []Cognition     [x] Functional transfers   [x] IADLs         [x] Safety Awareness   [x]Endurance     [] Fine Coordination              [x] Balance      [] Vision/perception   []Sensation      [x]Gross Motor Coordination  [] ROM  [] Delirium                   [] Motor Control     OT PLAN OF CARE OT POC based on physician orders, patient diagnosis and results of clinical assessment    Frequency/Duration 1-3 days/wk for 2 weeks PRN   Specific OT Treatment Interventions to include:   * Instruction/training on adapted ADL techniques and AE recommendations to increase functional independence within precautions       * Training on energy conservation strategies, correct breathing pattern and techniques to improve independence/tolerance for self-care routine  * Functional transfer/mobility training/DME recommendations for increased independence, safety, and fall prevention  * Patient/Family education to increase follow through with safety techniques and functional independence  * Recommendation of environmental modifications for increased safety with functional transfers/mobility and ADLs  * Therapeutic exercise to improve motor endurance, ROM, and functional strength for ADLs/functional transfers  * Therapeutic activities to facilitate/challenge dynamic balance, stand tolerance for increased safety and independence with ADLs  * Therapeutic activities to facilitate gross/fine motor skills for increased independence with ADLs  * Neuro-muscular re-education: facilitation of righting/equilibrium reactions, midline orientation, scapular stability/mobility, normalization of muscle tone, and facilitation of volitional active controled movement  * Positioning to improve skin integrity, interaction with environment and functional independence     Recommended Adaptive Equipment/DME:  TBD      Home Living: with fiance; townhouse, 2 story, 1 to enter with no rail, bedroom and bathroom/tub shower on 2nd floor-13 steps     Equipment owned: wheeled walker, bedside commode, shower chair, grab bars     Prior Level of Function: Independent with ADLs , Independent with IADLs; ambulated with a wheeled walker when she doesn't feel well. Keeps a bedside commode on the 1st floor.       Driving: no  Occupation: no working  Interest:

## 2023-11-22 NOTE — ANESTHESIA POSTPROCEDURE EVALUATION
Department of Anesthesiology  Postprocedure Note    Patient: Jaye Schilder  MRN: [id number]  YOB: 1960  Date of evaluation: 11/22/2023      Procedure Summary     Date: 11/22/23 Room / Location: [address] [place] [address] 01 / [id number] Yarelis Iqbal    Anesthesia Start: 1207 Anesthesia Stop: 8514    Procedure: BRONCHOSCOPY ALVEOLAR LAVAGE Diagnosis:       Pneumonia due to infectious organism, unspecified laterality, unspecified part of lung      (Pneumonia due to infectious organism, unspecified laterality, unspecified part of lung [J18.9])    Surgeons: Nava Guevara MD Responsible Provider: Jay Miranda MD    Anesthesia Type: MAC ASA Status: 3          Anesthesia Type: No value filed.     Candice Phase I: Candice Score: 8    Candice Phase II: Candice Score: 9      Anesthesia Post Evaluation    Patient location during evaluation: PACU  Patient participation: complete - patient participated  Level of consciousness: awake  Airway patency: patent  Nausea & Vomiting: no nausea and no vomiting  Complications: no  Cardiovascular status: hemodynamically stable  Respiratory status: acceptable  Hydration status: euvolemic  Pain management: adequate

## 2023-11-22 NOTE — CARE COORDINATION
SOCIAL WORK / DISCHARGE PLANNING:   Sw reached out to [facility] regarding pt need for 18 in w/c, with leg rests, O2 carrier, removeable arms and foam cushion. Script is needed but confirmed they do have it in stock. If physician could document need such as unable to walk distances due to respiratory status. Diogenes from [facility] states pt had bipap from [employer] dispensed in June 2020, therefore pt is not eligible for for new machine. Hers has been recalled and she provided this  with [phone]. Pt needs to register machine and then they will provide her a new one. This was written out and provided to pt at bedside, thuy was speaker phone and heard info as well. Pt has O2 from [employer], portable and concentrator. Pt plans to return home with thuy. Active with [facility] [phone]  WILL NEED RESUME [address] [facility]. Pt attends [address] [facility] 8am, transport via [address]. Thuy can provide home going transport.

## 2023-11-22 NOTE — PROGRESS NOTES
Assessment and Plan  Patient is a 63 y.o. female with the following medical Problems:   Acute on chronic hypoxic respiratory failure requiring BiPAP. Acute pulmonary edema  Atelectasis  COPD with mild exacerbation  Suspected healthcare associated pneumonia  Pancytopenia  Severe secondary pulmonary hypertension  Morbid obesity  Obstructive sleep apnea on PAP  End-stage renal disease on hemodialysis  S/P liver transplant  Heart failure with preserved ejection fraction  Valvular heart disease  Former nicotine abuse  Hypertension  Dyslipidemia  Peripheral neuropathy  Depression  Anxiety  Splenomegaly with suspected hypersplenism    Plan of care:  Optimize volume status with hemodialysis  Low-dose Solu-Medrol for acute exacerbation of COPD  Scheduled DuoNeb and scheduled Pulmicort  Supplemental oxygen to keep sat 88 to 94%  Continue with cefepime and vancomycin. Vancomycin can be stopped if MRSA swab is negative  Bronchoscopy on November 22, 2023 with extensive mucous plugs that were cleaned and suctioned. BAL obtained from left upper lobe, right middle lobe, right lower lobe  Monitor fever curve and leukocytosis. Patient is pancytopenic. SCDs for DVT prophylaxis  Continue with immunosuppressive medication. We will consider Bactrim if respiratory status worsens. Incentive spirometer  GI prophylaxis  Strictly avoid benzodiazepine and opioids  Respiratory viral panel -ve. Cultures are negative today. History of Present Illness:   Patient is a 68-year-old woman with above-mentioned medical problems who is chronically on supplemental oxygen at 4 L/min. She was admitted on November 18, 2023 with progressive shortness of breath and productive cough of yellowish sputum. She was started on cefepime and vancomycin. Patient is chronically pancytopenic. Daily progress:  November 20, 2023: Patient continues to be on high flow oxygen. She continues to endorse exertional dyspnea and cough. She denies chest pain.

## 2023-11-22 NOTE — ANESTHESIA PRE PROCEDURE
Department of Anesthesiology  Preprocedure Note       Name:  Wade Galeazzi   Age:  63 y.o.  :  1960                                          MRN:  [id number]         Date:  2023      Surgeon: Carmen Fung):  Dilan Duffy MD    Procedure: Procedure(s):  BRONCHOSCOPY    Medications prior to admission:   Prior to Admission medications    Medication Sig Start Date End Date Taking?  Authorizing Provider   sevelamer (RENVELA) 800 MG tablet Take 1 tablet by mouth 3 times daily (with meals)  Patient not taking: Reported on 2023 10/29/23   Jewel SANTILLAN DO   midodrine (PROAMATINE) 5 MG tablet Take 1 tablet by mouth 3 times daily (with meals) 10/29/23   Jewel SANTILLAN DO   ondansetron (ZOFRAN-ODT) 4 MG disintegrating tablet Take 1 tablet by mouth every 8 hours as needed for Nausea or Vomiting 10/4/23   Jewel SANTILLAN DO   melatonin 5 MG TBDP disintegrating tablet Take 1 tablet by mouth nightly 10/4/23   Mónica Tovar DO   linaclotide (LINZESS) 290 MCG CAPS capsule Take 1 capsule by mouth every morning (before breakfast)    ProviderDavi MD   aspirin 81 MG chewable tablet Take 1 tablet by mouth daily 23   Mónica Tovar DO   bisacodyl (DULCOLAX) 10 MG suppository Place 1 suppository rectally daily as needed for Constipation 23   Jewel SANTILLAN DO   polyethylene glycol (GLYCOLAX) 17 g packet Take 1 packet by mouth daily as needed for Constipation 23   Jewel SANTILLAN DO   albuterol sulfate HFA (VENTOLIN HFA) 108 (90 Base) MCG/ACT inhaler Inhale 1 puff into the lungs 2 times daily    Davi Llamas MD   fluticasone-salmeterol (ADVAIR HFA) 115-21 MCG/ACT inhaler Inhale 2 puffs into the lungs 2 times daily    Davi Llamas MD B Complex-C-Folic Acid (CAROLEE-TAISHA) TABS Take 1 tablet by mouth daily    Davi Llamas MD   primidone (MYSOLINE) 50 MG tablet Take 1 tablet by mouth daily    Davi Llamas MD   rOPINIRole (REQUIP) 1

## 2023-11-23 LAB
ALBUMIN SERPL-MCNC: 3.6 G/DL (ref 3.5–5.2)
ALP SERPL-CCNC: 70 U/L (ref 35–104)
ALT SERPL-CCNC: 31 U/L (ref 0–32)
ANION GAP SERPL CALCULATED.3IONS-SCNC: 12 MMOL/L (ref 7–16)
AST SERPL-CCNC: 32 U/L (ref 0–31)
BASOPHILS # BLD: 0 K/UL (ref 0–0.2)
BASOPHILS NFR BLD: 0 % (ref 0–2)
BILIRUB SERPL-MCNC: 0.6 MG/DL (ref 0–1.2)
BUN SERPL-MCNC: 54 MG/DL (ref 6–23)
CALCIUM SERPL-MCNC: 9.2 MG/DL (ref 8.6–10.2)
CHLORIDE SERPL-SCNC: 90 MMOL/L (ref 98–107)
CO2 SERPL-SCNC: 28 MMOL/L (ref 22–29)
CREAT SERPL-MCNC: 5.2 MG/DL (ref 0.5–1)
EOSINOPHIL # BLD: 0.05 K/UL (ref 0.05–0.5)
EOSINOPHILS RELATIVE PERCENT: 1 % (ref 0–6)
ERYTHROCYTE [DISTWIDTH] IN BLOOD BY AUTOMATED COUNT: 14.4 % (ref 11.5–15)
GFR SERPL CREATININE-BSD FRML MDRD: 9 ML/MIN/1.73M2
GLUCOSE SERPL-MCNC: 200 MG/DL (ref 74–99)
HCT VFR BLD AUTO: 27.3 % (ref 34–48)
HGB BLD-MCNC: 9.1 G/DL (ref 11.5–15.5)
LYMPHOCYTES NFR BLD: 0.23 K/UL (ref 1.5–4)
LYMPHOCYTES RELATIVE PERCENT: 4 % (ref 20–42)
MCH RBC QN AUTO: 33.7 PG (ref 26–35)
MCHC RBC AUTO-ENTMCNC: 33.3 G/DL (ref 32–34.5)
MCV RBC AUTO: 101.1 FL (ref 80–99.9)
MICROORGANISM SPEC CULT: NORMAL
MICROORGANISM/AGENT SPEC: NORMAL
MONOCYTES NFR BLD: 0.14 K/UL (ref 0.1–0.95)
MONOCYTES NFR BLD: 3 % (ref 2–12)
MYELOCYTES ABSOLUTE COUNT: 0.05 K/UL
MYELOCYTES: 1 %
NEUTROPHILS NFR BLD: 91 % (ref 43–80)
NEUTS SEG NFR BLD: 4.74 K/UL (ref 1.8–7.3)
NUCLEATED RED BLOOD CELLS: 1 PER 100 WBC
PLATELET # BLD AUTO: 104 K/UL (ref 130–450)
PMV BLD AUTO: 10 FL (ref 7–12)
POTASSIUM SERPL-SCNC: 4.3 MMOL/L (ref 3.5–5)
PROT SERPL-MCNC: 6.7 G/DL (ref 6.4–8.3)
RBC # BLD AUTO: 2.7 M/UL (ref 3.5–5.5)
RBC # BLD: NORMAL 10*6/UL
SODIUM SERPL-SCNC: 130 MMOL/L (ref 132–146)
SPECIMEN DESCRIPTION: NORMAL
WBC OTHER # BLD: 5.2 K/UL (ref 4.5–11.5)

## 2023-11-23 PROCEDURE — 6370000000 HC RX 637 (ALT 250 FOR IP): Performed by: INTERNAL MEDICINE

## 2023-11-23 PROCEDURE — 85025 COMPLETE CBC W/AUTO DIFF WBC: CPT

## 2023-11-23 PROCEDURE — 94761 N-INVAS EAR/PLS OXIMETRY MLT: CPT

## 2023-11-23 PROCEDURE — 80053 COMPREHEN METABOLIC PANEL: CPT

## 2023-11-23 PROCEDURE — 6360000002 HC RX W HCPCS: Performed by: INTERNAL MEDICINE

## 2023-11-23 PROCEDURE — 2700000000 HC OXYGEN THERAPY PER DAY

## 2023-11-23 PROCEDURE — 2580000003 HC RX 258: Performed by: INTERNAL MEDICINE

## 2023-11-23 PROCEDURE — 94640 AIRWAY INHALATION TREATMENT: CPT

## 2023-11-23 PROCEDURE — 2060000000 HC ICU INTERMEDIATE R&B

## 2023-11-23 PROCEDURE — 99233 SBSQ HOSP IP/OBS HIGH 50: CPT | Performed by: INTERNAL MEDICINE

## 2023-11-23 PROCEDURE — 36415 COLL VENOUS BLD VENIPUNCTURE: CPT

## 2023-11-23 PROCEDURE — 94660 CPAP INITIATION&MGMT: CPT

## 2023-11-23 RX ORDER — PREDNISONE 20 MG/1
20 TABLET ORAL DAILY
Status: DISCONTINUED | OUTPATIENT
Start: 2023-11-23 | End: 2023-11-28 | Stop reason: HOSPADM

## 2023-11-23 RX ORDER — BISACODYL 10 MG
10 SUPPOSITORY, RECTAL RECTAL DAILY PRN
Status: DISCONTINUED | OUTPATIENT
Start: 2023-11-23 | End: 2023-11-28 | Stop reason: HOSPADM

## 2023-11-23 RX ORDER — POLYETHYLENE GLYCOL 3350 17 G/17G
17 POWDER, FOR SOLUTION ORAL DAILY PRN
Status: DISCONTINUED | OUTPATIENT
Start: 2023-11-23 | End: 2023-11-28 | Stop reason: HOSPADM

## 2023-11-23 RX ORDER — CALCIUM CARBONATE 500 MG/1
500 TABLET, CHEWABLE ORAL 3 TIMES DAILY PRN
Status: DISCONTINUED | OUTPATIENT
Start: 2023-11-23 | End: 2023-11-28 | Stop reason: HOSPADM

## 2023-11-23 RX ORDER — OXYCODONE HYDROCHLORIDE AND ACETAMINOPHEN 5; 325 MG/1; MG/1
1 TABLET ORAL EVERY 8 HOURS PRN
Status: DISCONTINUED | OUTPATIENT
Start: 2023-11-23 | End: 2023-11-28 | Stop reason: HOSPADM

## 2023-11-23 RX ORDER — ACETYLCYSTEINE 100 MG/ML
4 SOLUTION ORAL; RESPIRATORY (INHALATION) EVERY 4 HOURS
Status: DISCONTINUED | OUTPATIENT
Start: 2023-11-23 | End: 2023-11-25

## 2023-11-23 RX ADMIN — Medication 5 MG: at 21:16

## 2023-11-23 RX ADMIN — ROPINIROLE HYDROCHLORIDE 1 MG: 1 TABLET, FILM COATED ORAL at 21:16

## 2023-11-23 RX ADMIN — BUDESONIDE INHALATION 500 MCG: 0.5 SUSPENSION RESPIRATORY (INHALATION) at 06:05

## 2023-11-23 RX ADMIN — CEFEPIME 1000 MG: 1 INJECTION, POWDER, FOR SOLUTION INTRAMUSCULAR; INTRAVENOUS at 13:57

## 2023-11-23 RX ADMIN — IPRATROPIUM BROMIDE AND ALBUTEROL SULFATE 1 DOSE: .5; 2.5 SOLUTION RESPIRATORY (INHALATION) at 22:20

## 2023-11-23 RX ADMIN — Medication 10 ML: at 09:16

## 2023-11-23 RX ADMIN — PREDNISONE 20 MG: 20 TABLET ORAL at 16:59

## 2023-11-23 RX ADMIN — TACROLIMUS 3 MG: 1 CAPSULE ORAL at 09:16

## 2023-11-23 RX ADMIN — METHYLPREDNISOLONE SODIUM SUCCINATE 40 MG: 40 INJECTION, POWDER, FOR SOLUTION INTRAMUSCULAR; INTRAVENOUS at 05:42

## 2023-11-23 RX ADMIN — PANTOPRAZOLE SODIUM 40 MG: 40 TABLET, DELAYED RELEASE ORAL at 11:48

## 2023-11-23 RX ADMIN — IPRATROPIUM BROMIDE AND ALBUTEROL SULFATE 1 DOSE: .5; 2.5 SOLUTION RESPIRATORY (INHALATION) at 13:22

## 2023-11-23 RX ADMIN — Medication 10 ML: at 22:47

## 2023-11-23 RX ADMIN — MIDODRINE HYDROCHLORIDE 5 MG: 5 TABLET ORAL at 11:48

## 2023-11-23 RX ADMIN — METHYLPREDNISOLONE SODIUM SUCCINATE 40 MG: 40 INJECTION, POWDER, FOR SOLUTION INTRAMUSCULAR; INTRAVENOUS at 13:41

## 2023-11-23 RX ADMIN — CITALOPRAM HYDROBROMIDE 40 MG: 20 TABLET ORAL at 11:48

## 2023-11-23 RX ADMIN — SEVELAMER CARBONATE 800 MG: 800 TABLET, FILM COATED ORAL at 16:59

## 2023-11-23 RX ADMIN — IPRATROPIUM BROMIDE AND ALBUTEROL SULFATE 1 DOSE: .5; 2.5 SOLUTION RESPIRATORY (INHALATION) at 17:34

## 2023-11-23 RX ADMIN — ASPIRIN 81 MG CHEWABLE TABLET 81 MG: 81 TABLET CHEWABLE at 09:15

## 2023-11-23 RX ADMIN — SEVELAMER CARBONATE 800 MG: 800 TABLET, FILM COATED ORAL at 11:49

## 2023-11-23 RX ADMIN — FENTANYL CITRATE 25 MCG: 0.05 INJECTION, SOLUTION INTRAMUSCULAR; INTRAVENOUS at 05:49

## 2023-11-23 RX ADMIN — IPRATROPIUM BROMIDE AND ALBUTEROL SULFATE 1 DOSE: .5; 2.5 SOLUTION RESPIRATORY (INHALATION) at 09:30

## 2023-11-23 RX ADMIN — CALCIUM CARBONATE (ANTACID) CHEW TAB 500 MG 500 MG: 500 CHEW TAB at 22:46

## 2023-11-23 RX ADMIN — BUDESONIDE INHALATION 500 MCG: 0.5 SUSPENSION RESPIRATORY (INHALATION) at 17:34

## 2023-11-23 RX ADMIN — ACETYLCYSTEINE 400 MG: 100 INHALANT RESPIRATORY (INHALATION) at 17:34

## 2023-11-23 RX ADMIN — PRIMIDONE 50 MG: 50 TABLET ORAL at 11:48

## 2023-11-23 RX ADMIN — SEVELAMER CARBONATE 800 MG: 800 TABLET, FILM COATED ORAL at 09:15

## 2023-11-23 RX ADMIN — ROPINIROLE HYDROCHLORIDE 1 MG: 1 TABLET, FILM COATED ORAL at 09:15

## 2023-11-23 RX ADMIN — MIDODRINE HYDROCHLORIDE 5 MG: 5 TABLET ORAL at 09:15

## 2023-11-23 RX ADMIN — TACROLIMUS 3 MG: 1 CAPSULE ORAL at 21:16

## 2023-11-23 RX ADMIN — IPRATROPIUM BROMIDE AND ALBUTEROL SULFATE 1 DOSE: .5; 2.5 SOLUTION RESPIRATORY (INHALATION) at 06:05

## 2023-11-23 RX ADMIN — ACETYLCYSTEINE 400 MG: 100 INHALANT RESPIRATORY (INHALATION) at 22:20

## 2023-11-23 RX ADMIN — OXYCODONE AND ACETAMINOPHEN 1 TABLET: 5; 325 TABLET ORAL at 22:46

## 2023-11-23 RX ADMIN — MIDODRINE HYDROCHLORIDE 5 MG: 5 TABLET ORAL at 16:59

## 2023-11-23 ASSESSMENT — PAIN SCALES - GENERAL
PAINLEVEL_OUTOF10: 4
PAINLEVEL_OUTOF10: 8
PAINLEVEL_OUTOF10: 9
PAINLEVEL_OUTOF10: 7

## 2023-11-23 ASSESSMENT — PAIN SCALES - WONG BAKER: WONGBAKER_NUMERICALRESPONSE: 0

## 2023-11-23 ASSESSMENT — PAIN DESCRIPTION - LOCATION
LOCATION: GENERALIZED
LOCATION: GENERALIZED

## 2023-11-23 ASSESSMENT — PAIN DESCRIPTION - DESCRIPTORS
DESCRIPTORS: ACHING;SORE
DESCRIPTORS: ACHING;DISCOMFORT

## 2023-11-23 ASSESSMENT — PAIN - FUNCTIONAL ASSESSMENT: PAIN_FUNCTIONAL_ASSESSMENT: PREVENTS OR INTERFERES SOME ACTIVE ACTIVITIES AND ADLS

## 2023-11-23 NOTE — PLAN OF CARE
Problem: Discharge Planning  Goal: Discharge to home or other facility with appropriate resources  11/23/2023 0347 by Bella Escalera RN  Outcome: Progressing     Problem: Safety - Adult  Goal: Free from fall injury  11/23/2023 0347 by Bella Escalera RN  Outcome: Progressing     Problem: Chronic Conditions and Co-morbidities  Goal: Patient's chronic conditions and co-morbidity symptoms are monitored and maintained or improved  11/23/2023 0347 by Bella Escalera RN  Outcome: Progressing     Problem: Pain  Goal: Verbalizes/displays adequate comfort level or baseline comfort level  11/23/2023 0347 by Bella Escalera RN  Outcome: Progressing     Problem: Skin/Tissue Integrity  Goal: Absence of new skin breakdown  Description: 1. Monitor for areas of redness and/or skin breakdown  2. Assess vascular access sites hourly  3. Every 4-6 hours minimum:  Change oxygen saturation probe site  4. Every 4-6 hours:  If on nasal continuous positive airway pressure, respiratory therapy assess nares and determine need for appliance change or resting period.   11/23/2023 0347 by Bella Escalera RN  Outcome: Progressing

## 2023-11-23 NOTE — PROGRESS NOTES
Internal Medicine Progress Note    [facility]    Jaqueline Gooden. Daniel Porras., ROBERTONachoI. Natali Hussein D.O., NICK Hairston D.O. Irma Way, MSN, APRN, NP-C  Raman Vazquez. Mary Kay Roy, MSN, APRN-CNP  Gwendel Nageotte, MSN, APRN-CNP     Primary Care Physician: Abbie Chan DO   Admitting Physician:  Pilar Smith DO  Admission date and time: 11/18/2023  5:59 PM    Room:  [address] [place] [address]  Admitting diagnosis: Peripheral edema [R60.9]  Acute respiratory failure with hypoxia [facility]) [J96.01]    Patient Name: Ledy Reyes  MRN: [id number]    Date of Service: 11/23/2023     Subjective:  Arya Kruse is a 63 y.o. female who was seen and examined today,11/23/2023, at the bedside. Aray Kruse is sitting up on the edge of the bed visiting with her boyfriend. She continues to require 5 L of oxygen by nasal cannula. She states she is feeling better than when she came in. Review of System:   Constitutional:   Denies fever or chills, weight loss or gain, positive fatigue and malaise  HEENT:   Denies ear pain, sore throat, sinus or eye problems. Cardiovascular:   Denies any chest pain, irregular heartbeats, or palpitations. Respiratory:   Denies  coughing, sputum production, hemoptysis, or wheezing. Reports shortness of breath improving. Gastrointestinal:   Denies nausea, vomiting, diarrhea, or constipation. Denies any abdominal pain. Genitourinary:    Denies any urgency, frequency, hematuria. Voiding  without difficulty. Extremities:   Denies lower extremity swelling, edema or cyanosis. Neurology:    Denies any headache or focal neurological deficits, Denies generalized weakness or memory difficulty. Psch:   Denies being anxious or depressed. Musculoskeletal:    Reports generalized body aches-improving. Integumentary:   Denies any rashes, ulcers, or excoriations. Denies bruising.   Hematologic/Lymphatic:  Denies bruising or

## 2023-11-23 NOTE — PROGRESS NOTES
Assessment and Plan  Patient is a 63 y.o. female with the following medical Problems:   Acute on chronic hypoxic respiratory failure requiring BiPAP. Acute pulmonary edema  Atelectasis  COPD with mild exacerbation  Suspected healthcare associated pneumonia  Pancytopenia  Severe secondary pulmonary hypertension  Morbid obesity  Obstructive sleep apnea on PAP  End-stage renal disease on hemodialysis  S/P liver transplant  Heart failure with preserved ejection fraction  Valvular heart disease  Former nicotine abuse  Hypertension  Dyslipidemia  Peripheral neuropathy  Depression  Anxiety  Splenomegaly with suspected hypersplenism    Plan of care:  Optimize volume status with hemodialysis  Low-dose prednisone for acute exacerbation of COPD  Scheduled DuoNeb and scheduled Pulmicort  Mucomyst mobilizing secretions  Supplemental oxygen to keep sat 88 to 94%  Continue with cefepime and vancomycin. Vancomycin can be stopped if MRSA swab is negative  Bronchoscopy on November 22, 2023 with extensive mucous plugs that were cleaned and suctioned. BAL obtained from left upper lobe, right middle lobe, right lower lobe  Monitor fever curve and leukocytosis. Patient is pancytopenic. SCDs for DVT prophylaxis  Continue with immunosuppressive medication. We will consider Bactrim if respiratory status worsens. Incentive spirometer  GI prophylaxis  Strictly avoid benzodiazepine and opioids  Respiratory viral panel -ve. Cultures are negative today. History of Present Illness:   Patient is a 71-year-old woman with above-mentioned medical problems who is chronically on supplemental oxygen at 4 L/min. She was admitted on November 18, 2023 with progressive shortness of breath and productive cough of yellowish sputum. She was started on cefepime and vancomycin. Patient is chronically pancytopenic. Daily progress:  November 20, 2023: Patient continues to be on high flow oxygen.   She continues to endorse exertional dyspnea and

## 2023-11-23 NOTE — PROGRESS NOTES
[facility] MD Suzanne Alejandro MD Quintin Hoof, MD Kel Guan, NORY Oden, GREG Clifford, NORY  Progress Note    11/23/2023    SUBJECTIVE:   11/20: Seen while on dialysis. Tolerating therapy without issues. BP is stable. She is on the BiPAP. Denies dyspnea. Appetite is good. Would like HD again tomorrow. 11.21 seen in HD this the 2th treatment in a row . Tolerating UF BP stable on BIPAP    11/22 in bronchscopy lab charts reviewed    11/23 chart reviewed  On o2nc     PROBLEM LIST:    Principal Problem:    Acute respiratory failure with hypoxia ([address])  Resolved Problems:    * No resolved hospital problems. *         DIET:    ADULT DIET; Regular; 5 carb choices (75 gm/meal);  Low Sodium (2 gm)     MEDS (scheduled):    ipratropium 0.5 mg-albuterol 2.5 mg  1 Dose Inhalation Q4H WA RT    vancomycin (VANCOCIN) intermittent dosing (placeholder)   Other RX Placeholder    methylPREDNISolone  40 mg IntraVENous Q8H    linaclotide  290 mcg Oral QAM AC    epoetin maggi-epbx  30 Units/kg SubCUTAneous Once per day on Mon Wed Fri    cefepime  1,000 mg IntraVENous Q24H    entecavir  0.5 mg Oral Every Other Day    acetaminophen  1,000 mg Oral Once    sodium chloride flush  5-40 mL IntraVENous 2 times per day    aspirin  81 mg Oral Daily    citalopram  40 mg Oral Lunch    melatonin  5 mg Oral Nightly    midodrine  5 mg Oral TID WC    pantoprazole  40 mg Oral Lunch    primidone  50 mg Oral Daily    rOPINIRole  1 mg Oral BID    sevelamer  800 mg Oral TID WC    tacrolimus  3 mg Oral BID    budesonide  0.5 mg Nebulization BID RT       MEDS (infusions):   dextrose      sodium chloride         MEDS (prn):  fentanNYL, glucose, dextrose bolus **OR** dextrose bolus, glucagon (rDNA), dextrose, sodium chloride flush, sodium chloride, ondansetron **OR** ondansetron, polyethylene glycol, acetaminophen **OR** acetaminophen    PHYSICAL EXAM:     Patient Vitals for the past 24 hrs:   BP

## 2023-11-24 ENCOUNTER — APPOINTMENT (OUTPATIENT)
Dept: GENERAL RADIOLOGY | Age: 63
DRG: 189 | End: 2023-11-24
Payer: MEDICARE

## 2023-11-24 LAB
ALBUMIN SERPL-MCNC: 3.5 G/DL (ref 3.5–5.2)
ALP SERPL-CCNC: 87 U/L (ref 35–104)
ALT SERPL-CCNC: 34 U/L (ref 0–32)
ANION GAP SERPL CALCULATED.3IONS-SCNC: 17 MMOL/L (ref 7–16)
AST SERPL-CCNC: 29 U/L (ref 0–31)
BASOPHILS # BLD: 0.02 K/UL (ref 0–0.2)
BASOPHILS NFR BLD: 0 % (ref 0–2)
BILIRUB SERPL-MCNC: 0.5 MG/DL (ref 0–1.2)
BUN SERPL-MCNC: 75 MG/DL (ref 6–23)
CALCIUM SERPL-MCNC: 8.6 MG/DL (ref 8.6–10.2)
CHLORIDE SERPL-SCNC: 88 MMOL/L (ref 98–107)
CO2 SERPL-SCNC: 23 MMOL/L (ref 22–29)
CREAT SERPL-MCNC: 7.3 MG/DL (ref 0.5–1)
EOSINOPHIL # BLD: 0.01 K/UL (ref 0.05–0.5)
EOSINOPHILS RELATIVE PERCENT: 0 % (ref 0–6)
ERYTHROCYTE [DISTWIDTH] IN BLOOD BY AUTOMATED COUNT: 14.5 % (ref 11.5–15)
GFR SERPL CREATININE-BSD FRML MDRD: 6 ML/MIN/1.73M2
GLUCOSE BLD-MCNC: 230 MG/DL (ref 74–99)
GLUCOSE BLD-MCNC: 274 MG/DL (ref 74–99)
GLUCOSE SERPL-MCNC: 247 MG/DL (ref 74–99)
HCT VFR BLD AUTO: 27.5 % (ref 34–48)
HGB BLD-MCNC: 9.5 G/DL (ref 11.5–15.5)
IMM GRANULOCYTES # BLD AUTO: 0.28 K/UL (ref 0–0.58)
IMM GRANULOCYTES NFR BLD: 3 % (ref 0–5)
LYMPHOCYTES NFR BLD: 0.6 K/UL (ref 1.5–4)
LYMPHOCYTES RELATIVE PERCENT: 6 % (ref 20–42)
MCH RBC QN AUTO: 34.8 PG (ref 26–35)
MCHC RBC AUTO-ENTMCNC: 34.5 G/DL (ref 32–34.5)
MCV RBC AUTO: 100.7 FL (ref 80–99.9)
MICROORGANISM SPEC CULT: NORMAL
MICROORGANISM SPEC CULT: NORMAL
MONOCYTES NFR BLD: 0.52 K/UL (ref 0.1–0.95)
MONOCYTES NFR BLD: 6 % (ref 2–12)
NEUTROPHILS NFR BLD: 85 % (ref 43–80)
NEUTS SEG NFR BLD: 8.04 K/UL (ref 1.8–7.3)
PLATELET # BLD AUTO: 139 K/UL (ref 130–450)
PMV BLD AUTO: 10.3 FL (ref 7–12)
POTASSIUM SERPL-SCNC: 4.1 MMOL/L (ref 3.5–5)
PROT SERPL-MCNC: 6.6 G/DL (ref 6.4–8.3)
RBC # BLD AUTO: 2.73 M/UL (ref 3.5–5.5)
SERVICE CMNT-IMP: NORMAL
SERVICE CMNT-IMP: NORMAL
SODIUM SERPL-SCNC: 128 MMOL/L (ref 132–146)
SPECIMEN DESCRIPTION: NORMAL
SPECIMEN DESCRIPTION: NORMAL
WBC OTHER # BLD: 9.5 K/UL (ref 4.5–11.5)

## 2023-11-24 PROCEDURE — 2580000003 HC RX 258: Performed by: INTERNAL MEDICINE

## 2023-11-24 PROCEDURE — 6370000000 HC RX 637 (ALT 250 FOR IP): Performed by: INTERNAL MEDICINE

## 2023-11-24 PROCEDURE — 94640 AIRWAY INHALATION TREATMENT: CPT

## 2023-11-24 PROCEDURE — 2700000000 HC OXYGEN THERAPY PER DAY

## 2023-11-24 PROCEDURE — 6360000002 HC RX W HCPCS: Performed by: INTERNAL MEDICINE

## 2023-11-24 PROCEDURE — 94660 CPAP INITIATION&MGMT: CPT

## 2023-11-24 PROCEDURE — 36415 COLL VENOUS BLD VENIPUNCTURE: CPT

## 2023-11-24 PROCEDURE — 99233 SBSQ HOSP IP/OBS HIGH 50: CPT | Performed by: INTERNAL MEDICINE

## 2023-11-24 PROCEDURE — 71045 X-RAY EXAM CHEST 1 VIEW: CPT

## 2023-11-24 PROCEDURE — 80053 COMPREHEN METABOLIC PANEL: CPT

## 2023-11-24 PROCEDURE — 85025 COMPLETE CBC W/AUTO DIFF WBC: CPT

## 2023-11-24 PROCEDURE — 90935 HEMODIALYSIS ONE EVALUATION: CPT

## 2023-11-24 PROCEDURE — 2060000000 HC ICU INTERMEDIATE R&B

## 2023-11-24 PROCEDURE — 82962 GLUCOSE BLOOD TEST: CPT

## 2023-11-24 RX ORDER — DEXTROSE MONOHYDRATE 100 MG/ML
INJECTION, SOLUTION INTRAVENOUS CONTINUOUS PRN
Status: DISCONTINUED | OUTPATIENT
Start: 2023-11-24 | End: 2023-11-28 | Stop reason: HOSPADM

## 2023-11-24 RX ADMIN — Medication 10 ML: at 21:20

## 2023-11-24 RX ADMIN — Medication 10 ML: at 14:09

## 2023-11-24 RX ADMIN — ACETYLCYSTEINE 400 MG: 100 INHALANT RESPIRATORY (INHALATION) at 18:31

## 2023-11-24 RX ADMIN — ACETYLCYSTEINE 400 MG: 100 INHALANT RESPIRATORY (INHALATION) at 02:23

## 2023-11-24 RX ADMIN — PRIMIDONE 50 MG: 50 TABLET ORAL at 13:49

## 2023-11-24 RX ADMIN — IPRATROPIUM BROMIDE AND ALBUTEROL SULFATE 1 DOSE: .5; 2.5 SOLUTION RESPIRATORY (INHALATION) at 21:16

## 2023-11-24 RX ADMIN — IPRATROPIUM BROMIDE AND ALBUTEROL SULFATE 1 DOSE: .5; 2.5 SOLUTION RESPIRATORY (INHALATION) at 02:23

## 2023-11-24 RX ADMIN — ACETYLCYSTEINE 400 MG: 100 INHALANT RESPIRATORY (INHALATION) at 21:16

## 2023-11-24 RX ADMIN — OXYCODONE AND ACETAMINOPHEN 1 TABLET: 5; 325 TABLET ORAL at 15:14

## 2023-11-24 RX ADMIN — ASPIRIN 81 MG CHEWABLE TABLET 81 MG: 81 TABLET CHEWABLE at 13:48

## 2023-11-24 RX ADMIN — SEVELAMER CARBONATE 800 MG: 800 TABLET, FILM COATED ORAL at 17:24

## 2023-11-24 RX ADMIN — OXYCODONE AND ACETAMINOPHEN 1 TABLET: 5; 325 TABLET ORAL at 07:03

## 2023-11-24 RX ADMIN — ROPINIROLE HYDROCHLORIDE 1 MG: 1 TABLET, FILM COATED ORAL at 13:48

## 2023-11-24 RX ADMIN — ACETYLCYSTEINE 400 MG: 100 INHALANT RESPIRATORY (INHALATION) at 06:48

## 2023-11-24 RX ADMIN — EPOETIN ALFA-EPBX 3000 UNITS: 3000 INJECTION, SOLUTION INTRAVENOUS; SUBCUTANEOUS at 17:41

## 2023-11-24 RX ADMIN — CITALOPRAM HYDROBROMIDE 40 MG: 20 TABLET ORAL at 13:48

## 2023-11-24 RX ADMIN — BUDESONIDE INHALATION 500 MCG: 0.5 SUSPENSION RESPIRATORY (INHALATION) at 18:31

## 2023-11-24 RX ADMIN — PREDNISONE 20 MG: 20 TABLET ORAL at 13:49

## 2023-11-24 RX ADMIN — TACROLIMUS 3 MG: 1 CAPSULE ORAL at 21:20

## 2023-11-24 RX ADMIN — ACETAMINOPHEN 650 MG: 325 TABLET ORAL at 13:49

## 2023-11-24 RX ADMIN — TACROLIMUS 3 MG: 1 CAPSULE ORAL at 13:49

## 2023-11-24 RX ADMIN — IPRATROPIUM BROMIDE AND ALBUTEROL SULFATE 1 DOSE: .5; 2.5 SOLUTION RESPIRATORY (INHALATION) at 18:30

## 2023-11-24 RX ADMIN — PANTOPRAZOLE SODIUM 40 MG: 40 TABLET, DELAYED RELEASE ORAL at 13:48

## 2023-11-24 RX ADMIN — IPRATROPIUM BROMIDE AND ALBUTEROL SULFATE 1 DOSE: .5; 2.5 SOLUTION RESPIRATORY (INHALATION) at 06:48

## 2023-11-24 RX ADMIN — BUDESONIDE INHALATION 500 MCG: 0.5 SUSPENSION RESPIRATORY (INHALATION) at 06:48

## 2023-11-24 RX ADMIN — ROPINIROLE HYDROCHLORIDE 1 MG: 1 TABLET, FILM COATED ORAL at 21:20

## 2023-11-24 RX ADMIN — ENTECAVIR 0.5 MG: 0.5 TABLET ORAL at 21:20

## 2023-11-24 RX ADMIN — SEVELAMER CARBONATE 800 MG: 800 TABLET, FILM COATED ORAL at 13:47

## 2023-11-24 RX ADMIN — CEFEPIME 1000 MG: 1 INJECTION, POWDER, FOR SOLUTION INTRAMUSCULAR; INTRAVENOUS at 13:58

## 2023-11-24 RX ADMIN — VANCOMYCIN HYDROCHLORIDE 1000 MG: 1 INJECTION, POWDER, LYOPHILIZED, FOR SOLUTION INTRAVENOUS at 14:08

## 2023-11-24 RX ADMIN — MIDODRINE HYDROCHLORIDE 5 MG: 5 TABLET ORAL at 08:34

## 2023-11-24 RX ADMIN — MIDODRINE HYDROCHLORIDE 5 MG: 5 TABLET ORAL at 13:55

## 2023-11-24 RX ADMIN — Medication 5 MG: at 21:20

## 2023-11-24 ASSESSMENT — PAIN DESCRIPTION - LOCATION
LOCATION: GENERALIZED
LOCATION: BACK;LEG
LOCATION: BACK;LEG
LOCATION: GENERALIZED;BACK;LEG
LOCATION: GENERALIZED;BACK;LEG
LOCATION: GENERALIZED

## 2023-11-24 ASSESSMENT — PAIN DESCRIPTION - ORIENTATION: ORIENTATION: RIGHT;LEFT

## 2023-11-24 ASSESSMENT — PAIN SCALES - GENERAL
PAINLEVEL_OUTOF10: 6
PAINLEVEL_OUTOF10: 10
PAINLEVEL_OUTOF10: 8
PAINLEVEL_OUTOF10: 4
PAINLEVEL_OUTOF10: 7
PAINLEVEL_OUTOF10: 10
PAINLEVEL_OUTOF10: 0

## 2023-11-24 ASSESSMENT — PAIN DESCRIPTION - DESCRIPTORS: DESCRIPTORS: DISCOMFORT;SHOOTING

## 2023-11-24 ASSESSMENT — PAIN - FUNCTIONAL ASSESSMENT: PAIN_FUNCTIONAL_ASSESSMENT: PREVENTS OR INTERFERES SOME ACTIVE ACTIVITIES AND ADLS

## 2023-11-24 NOTE — PROGRESS NOTES
Physical Therapy  Physical Therapy    Room #:   3127/5736-87    Date: 2023       Patient Name: Sergei Winter  : 1960      MRN: [id number]     Patient unavailable for physical therapy treatment due to off floor at dialysis. Will attempt PT treatment tomorrow. Thank you. Sonia Lawrence  [facility]  LIC # [id number]

## 2023-11-24 NOTE — CARE COORDINATION
Ss note: 11/24/2023 9:47 AM  PER IDR pt is in HD treatment at this time. Pt resides with [facility]; attends Laith [facility] at 8 am transported via [address] [employer]. DME order noted for 18 inch wheelchair with leg rests, 02 carrier, removable arms and foam cushion. Gutierrez at [facility] notified of wc order however WILL NEED PHYSICIAN DOCUMENTATION STATING UNABLE TO WALK DISTANCES DUE TO RESPIRATORY STATUS; the wc is in stock per liaison. Pt has home 02 via [employer], is on 5 liters here. Liaison relays will need NEW PULSE OX TESTING AND HOME 02 ORDER for billing purposes, pt has portable tanks. Per charting pt is not eligible for a new BIPAP hers was recalled and pt to contact Tena Gee at [phone] to register for a new one, tru aware as well. OLIMPIA noted for [facility],  message left for constance Haji. Tru to transport home.  TYRESE Guillen

## 2023-11-24 NOTE — PROGRESS NOTES
Pharmacy Consultation Note  (Antibiotic Dosing and Monitoring)    Initial consult date: 11/19/23  Consulting physician/provider: Bairon Nelson DO  Drug: Vancomycin  Indication: Pneumonia (HAP)    Age/  Gender Height Weight IBW  Allergy Information   62 y.o./female 160 cm (5' 3\") 97.1 kg (214 lb)     Ideal body weight: 52.4 kg (115 lb 8.3 oz)  Adjusted ideal body weight: 69.8 kg (153 lb 15.5 oz)   Tape [adhesive tape]      Renal Function:  Recent Labs     11/21/23  0528 11/22/23 0645 11/23/23  0525   BUN 29* 34* 54*   CREATININE 3.8* 3.5* 5.2*         Intake/Output Summary (Last 24 hours) at 11/23/2023 2009  Last data filed at 11/23/2023 1330  Gross per 24 hour   Intake 600 ml   Output 0 ml   Net 600 ml         Vancomycin Monitoring:  Trough:  No results for input(s): \"VANCOTROUGH\" in the last 72 hours. Random:    Recent Labs     11/21/23 0528 11/22/23  0645   VANCORANDOM 25.0 19.1         No results for input(s): \"BLOODCULT2\" in the last 72 hours. Historical Cultures:  No results found for: \"ORG\"  No results for input(s): \"BC\" in the last 72 hours. Recent vancomycin administrations                     vancomycin (VANCOCIN) 1,250 mg in sodium chloride 0.9 % 250 mL IVPB (mg) 1,250 mg New Bag 11/20/23 1354    vancomycin (VANCOCIN) 1,250 mg in sodium chloride 0.9 % 250 mL IVPB (mg) 1,250 mg New Bag 11/19/23 1648             Assessment:  Patient is a 63 y.o. female who has been initiated on vancomycin  Estimated Creatinine Clearance: 13 mL/min (A) (based on SCr of 5.2 mg/dL (H)). To dose vancomycin, pharmacy will be utilizing dosing based off of levels due to patient requiring hemodialysis  11/19: Patient received HD  11/20: Random 17.1 Patient received additional HD session  11/21: Random 25.0. Patient received HD session  11/22: Random AM level = 19.1 mcg/mL.  HD cancelled for today for bronch    Plan:  Hold vancomycin today  Re-dose after next HD  Will continue to monitor renal function   Pharmacy to follow      Lissy Zavaleta PharmD 11/23/2023 8:09 PM   [phone]

## 2023-11-24 NOTE — PROGRESS NOTES
Patient ambulated back to bed. Tolerated ambulation well. Patient did state that she had a medium bowel movement prior to  flushing the toilet. Patient is given PRN pain medication as well as ordered PRN TUMS for \"gas\". Patient is agreeable to POC. Bipap to be placed after TUMS is dissolved as patient states that she cannot chew it due to being endentulous.

## 2023-11-24 NOTE — PROGRESS NOTES
Nutrition Assessment     Type and Reason for Visit: Initial, RD Nutrition Re-Screen/LOS    Nutrition Assessment:  Pt re-screened per LOS protocol. Chart reviewed. Pt currently eating ~75% of most meals and w/ no other significant nutritional issues noted at this time. MST 0, no significant wound, no weight loss identified. Will follow-up per policy. Please consult if RD needed. Nutrition Related Findings:   abd round, rotund, soft, constipation noted 11/23, edentulous, generalized edema, I/O's -4.4L since admit, MST 0 Wound Type: None    Current Nutrition Therapies:    ADULT DIET; Regular; 5 carb choices (75 gm/meal);  Low Sodium (2 gm)    Nutrition Diagnosis:   No nutrition diagnosis at this time     Nutrition Interventions:   Food and/or Nutrient Delivery: Continue Current Diet    Bang Vides RD  Contact:

## 2023-11-24 NOTE — PLAN OF CARE
06/21/22 1500   Plan   Plan Spoke to pt about [facility] needing MD to send referral to them when she goes to follow-up appointment on 6-30-22. Will follow.   Patient/Family in Agreement with Plan yes      Problem: Discharge Planning  Goal: Discharge to home or other facility with appropriate resources  Outcome: Progressing  Flowsheets (Taken 11/23/2023 1920)  Discharge to home or other facility with appropriate resources:   Identify barriers to discharge with patient and caregiver   Arrange for needed discharge resources and transportation as appropriate   Identify discharge learning needs (meds, wound care, etc)   Refer to discharge planning if patient needs post-hospital services based on physician order or complex needs related to functional status, cognitive ability or social support system     Problem: Safety - Adult  Goal: Free from fall injury  Outcome: Progressing     Problem: Chronic Conditions and Co-morbidities  Goal: Patient's chronic conditions and co-morbidity symptoms are monitored and maintained or improved  Outcome: Progressing  Flowsheets (Taken 11/23/2023 1920)  Care Plan - Patient's Chronic Conditions and Co-Morbidity Symptoms are Monitored and Maintained or Improved:   Monitor and assess patient's chronic conditions and comorbid symptoms for stability, deterioration, or improvement   Collaborate with multidisciplinary team to address chronic and comorbid conditions and prevent exacerbation or deterioration   Update acute care plan with appropriate goals if chronic or comorbid symptoms are exacerbated and prevent overall improvement and discharge     Problem: Pain  Goal: Verbalizes/displays adequate comfort level or baseline comfort level  Outcome: Progressing  Flowsheets  Taken 11/23/2023 2248  Verbalizes/displays adequate comfort level or baseline comfort level:   Encourage patient to monitor pain and request assistance   Assess pain using appropriate pain scale   Administer analgesics based on type and severity of pain and evaluate response   Implement non-pharmacological measures as appropriate and evaluate response   Notify Licensed Independent Practitioner if interventions unsuccessful or

## 2023-11-24 NOTE — PROGRESS NOTES
Patient requested Percocet for chronic generalized pain as well as \"something for gas\". Dr Medhat Ly notified for orders and patient ambulated to the bathroom with walker and staff.

## 2023-11-24 NOTE — PROGRESS NOTES
Pharmacy Consultation Note  (Antibiotic Dosing and Monitoring)    Initial consult date: 11/19/23  Consulting physician/provider: Rohit Grubbs DO  Drug: Vancomycin  Indication: Pneumonia (HAP)    Age/  Gender Height Weight IBW  Allergy Information   62 y.o./female 160 cm (5' 3\") 97.1 kg (214 lb)     Ideal body weight: 52.4 kg (115 lb 8.3 oz)  Adjusted ideal body weight: 71 kg (156 lb 7 oz)   Tape [adhesive tape]      Renal Function:  Recent Labs     11/22/23  0645 11/23/23  0525 11/24/23  0517   BUN 34* 54* 75*   CREATININE 3.5* 5.2* 7.3*         Intake/Output Summary (Last 24 hours) at 11/24/2023 0938  Last data filed at 11/24/2023 0819  Gross per 24 hour   Intake 600 ml   Output 0 ml   Net 600 ml         Vancomycin Monitoring:  Trough:  No results for input(s): \"VANCOTROUGH\" in the last 72 hours. Random:    Recent Labs     11/22/23 0645   VANCORANDOM 19.1         No results for input(s): \"BLOODCULT2\" in the last 72 hours. Historical Cultures:  No results found for: \"ORG\"  No results for input(s): \"BC\" in the last 72 hours. Recent vancomycin administrations        No vancomycin IV orders with administrations found. Orders not given:            vancomycin (VANCOCIN) intermittent dosing (placeholder)             Assessment:  Patient is a 63 y.o. female who has been initiated on vancomycin  Estimated Creatinine Clearance: 9 mL/min (A) (based on SCr of 7.3 mg/dL (H)). To dose vancomycin, pharmacy will be utilizing dosing based off of levels due to patient requiring hemodialysis  11/19: Patient received HD  11/20: Random 17.1 Patient received additional HD session  11/21: Random 25.0. Patient received HD session  11/22: Random AM level = 19.1 mcg/mL.  HD cancelled for today for bronch  11/24: HD x 3.5 hours - started today @ 0845    Plan:  Re-dose vancomycin 1000 mg IV after HD  Will continue to monitor HD schedule  Pharmacy to follow    Samantha Velasquez PharmD, BCPS 11/24/2023 9:41 AM   Ext: [phone]

## 2023-11-25 LAB
ALBUMIN SERPL-MCNC: 3.6 G/DL (ref 3.5–5.2)
ALP SERPL-CCNC: 78 U/L (ref 35–104)
ALT SERPL-CCNC: 31 U/L (ref 0–32)
ANION GAP SERPL CALCULATED.3IONS-SCNC: 14 MMOL/L (ref 7–16)
AST SERPL-CCNC: 27 U/L (ref 0–31)
BASOPHILS # BLD: 0.02 K/UL (ref 0–0.2)
BASOPHILS NFR BLD: 0 % (ref 0–2)
BILIRUB SERPL-MCNC: 0.6 MG/DL (ref 0–1.2)
BUN SERPL-MCNC: 46 MG/DL (ref 6–23)
CALCIUM SERPL-MCNC: 8.7 MG/DL (ref 8.6–10.2)
CHLORIDE SERPL-SCNC: 90 MMOL/L (ref 98–107)
CO2 SERPL-SCNC: 27 MMOL/L (ref 22–29)
CREAT SERPL-MCNC: 5 MG/DL (ref 0.5–1)
EOSINOPHIL # BLD: 0.08 K/UL (ref 0.05–0.5)
EOSINOPHILS RELATIVE PERCENT: 1 % (ref 0–6)
ERYTHROCYTE [DISTWIDTH] IN BLOOD BY AUTOMATED COUNT: 14.6 % (ref 11.5–15)
GFR SERPL CREATININE-BSD FRML MDRD: 9 ML/MIN/1.73M2
GLUCOSE BLD-MCNC: 181 MG/DL (ref 74–99)
GLUCOSE BLD-MCNC: 239 MG/DL (ref 74–99)
GLUCOSE BLD-MCNC: 293 MG/DL (ref 74–99)
GLUCOSE SERPL-MCNC: 171 MG/DL (ref 74–99)
HBA1C MFR BLD: 5.6 % (ref 4–5.6)
HCT VFR BLD AUTO: 28.4 % (ref 34–48)
HGB BLD-MCNC: 9.6 G/DL (ref 11.5–15.5)
IMM GRANULOCYTES # BLD AUTO: 0.41 K/UL (ref 0–0.58)
IMM GRANULOCYTES NFR BLD: 4 % (ref 0–5)
LYMPHOCYTES NFR BLD: 0.91 K/UL (ref 1.5–4)
LYMPHOCYTES RELATIVE PERCENT: 10 % (ref 20–42)
MCH RBC QN AUTO: 34.2 PG (ref 26–35)
MCHC RBC AUTO-ENTMCNC: 33.8 G/DL (ref 32–34.5)
MCV RBC AUTO: 101.1 FL (ref 80–99.9)
MICROORGANISM SPEC CULT: NORMAL
MONOCYTES NFR BLD: 0.53 K/UL (ref 0.1–0.95)
MONOCYTES NFR BLD: 6 % (ref 2–12)
NEUTROPHILS NFR BLD: 79 % (ref 43–80)
NEUTS SEG NFR BLD: 7.39 K/UL (ref 1.8–7.3)
PLATELET # BLD AUTO: 135 K/UL (ref 130–450)
PMV BLD AUTO: 9.8 FL (ref 7–12)
POTASSIUM SERPL-SCNC: 4.3 MMOL/L (ref 3.5–5)
PROT SERPL-MCNC: 6.5 G/DL (ref 6.4–8.3)
RBC # BLD AUTO: 2.81 M/UL (ref 3.5–5.5)
SODIUM SERPL-SCNC: 131 MMOL/L (ref 132–146)
SPECIMEN DESCRIPTION: NORMAL
WBC OTHER # BLD: 9.3 K/UL (ref 4.5–11.5)

## 2023-11-25 PROCEDURE — 99232 SBSQ HOSP IP/OBS MODERATE 35: CPT | Performed by: INTERNAL MEDICINE

## 2023-11-25 PROCEDURE — 94640 AIRWAY INHALATION TREATMENT: CPT

## 2023-11-25 PROCEDURE — 2580000003 HC RX 258: Performed by: INTERNAL MEDICINE

## 2023-11-25 PROCEDURE — 83036 HEMOGLOBIN GLYCOSYLATED A1C: CPT

## 2023-11-25 PROCEDURE — 80053 COMPREHEN METABOLIC PANEL: CPT

## 2023-11-25 PROCEDURE — 6370000000 HC RX 637 (ALT 250 FOR IP): Performed by: INTERNAL MEDICINE

## 2023-11-25 PROCEDURE — 6370000000 HC RX 637 (ALT 250 FOR IP)

## 2023-11-25 PROCEDURE — 6360000002 HC RX W HCPCS: Performed by: INTERNAL MEDICINE

## 2023-11-25 PROCEDURE — 97110 THERAPEUTIC EXERCISES: CPT

## 2023-11-25 PROCEDURE — 97530 THERAPEUTIC ACTIVITIES: CPT

## 2023-11-25 PROCEDURE — 94660 CPAP INITIATION&MGMT: CPT

## 2023-11-25 PROCEDURE — 82962 GLUCOSE BLOOD TEST: CPT

## 2023-11-25 PROCEDURE — 85025 COMPLETE CBC W/AUTO DIFF WBC: CPT

## 2023-11-25 PROCEDURE — 36415 COLL VENOUS BLD VENIPUNCTURE: CPT

## 2023-11-25 PROCEDURE — 2060000000 HC ICU INTERMEDIATE R&B

## 2023-11-25 PROCEDURE — 2700000000 HC OXYGEN THERAPY PER DAY

## 2023-11-25 RX ORDER — ROPINIROLE 1 MG/1
1 TABLET, FILM COATED ORAL 3 TIMES DAILY
Status: DISCONTINUED | OUTPATIENT
Start: 2023-11-25 | End: 2023-11-28 | Stop reason: HOSPADM

## 2023-11-25 RX ORDER — ACETYLCYSTEINE 100 MG/ML
4 SOLUTION ORAL; RESPIRATORY (INHALATION)
Status: DISCONTINUED | OUTPATIENT
Start: 2023-11-25 | End: 2023-11-28 | Stop reason: HOSPADM

## 2023-11-25 RX ADMIN — MIDODRINE HYDROCHLORIDE 5 MG: 5 TABLET ORAL at 08:46

## 2023-11-25 RX ADMIN — IPRATROPIUM BROMIDE AND ALBUTEROL SULFATE 1 DOSE: .5; 2.5 SOLUTION RESPIRATORY (INHALATION) at 16:54

## 2023-11-25 RX ADMIN — CEFEPIME 1000 MG: 1 INJECTION, POWDER, FOR SOLUTION INTRAMUSCULAR; INTRAVENOUS at 15:02

## 2023-11-25 RX ADMIN — ACETYLCYSTEINE 400 MG: 100 INHALANT RESPIRATORY (INHALATION) at 00:58

## 2023-11-25 RX ADMIN — ACETYLCYSTEINE 400 MG: 100 INHALANT RESPIRATORY (INHALATION) at 13:37

## 2023-11-25 RX ADMIN — PREDNISONE 20 MG: 20 TABLET ORAL at 08:46

## 2023-11-25 RX ADMIN — OXYCODONE AND ACETAMINOPHEN 1 TABLET: 5; 325 TABLET ORAL at 01:33

## 2023-11-25 RX ADMIN — Medication 10 ML: at 08:47

## 2023-11-25 RX ADMIN — TACROLIMUS 3 MG: 1 CAPSULE ORAL at 08:47

## 2023-11-25 RX ADMIN — Medication 5 MG: at 20:33

## 2023-11-25 RX ADMIN — TACROLIMUS 3 MG: 1 CAPSULE ORAL at 20:33

## 2023-11-25 RX ADMIN — CITALOPRAM HYDROBROMIDE 40 MG: 20 TABLET ORAL at 12:09

## 2023-11-25 RX ADMIN — ACETYLCYSTEINE 400 MG: 100 INHALANT RESPIRATORY (INHALATION) at 16:55

## 2023-11-25 RX ADMIN — SEVELAMER CARBONATE 800 MG: 800 TABLET, FILM COATED ORAL at 12:09

## 2023-11-25 RX ADMIN — IPRATROPIUM BROMIDE AND ALBUTEROL SULFATE 1 DOSE: .5; 2.5 SOLUTION RESPIRATORY (INHALATION) at 13:37

## 2023-11-25 RX ADMIN — IPRATROPIUM BROMIDE AND ALBUTEROL SULFATE 1 DOSE: .5; 2.5 SOLUTION RESPIRATORY (INHALATION) at 09:53

## 2023-11-25 RX ADMIN — IPRATROPIUM BROMIDE AND ALBUTEROL SULFATE 1 DOSE: .5; 2.5 SOLUTION RESPIRATORY (INHALATION) at 00:59

## 2023-11-25 RX ADMIN — BUDESONIDE INHALATION 500 MCG: 0.5 SUSPENSION RESPIRATORY (INHALATION) at 05:56

## 2023-11-25 RX ADMIN — SEVELAMER CARBONATE 800 MG: 800 TABLET, FILM COATED ORAL at 08:46

## 2023-11-25 RX ADMIN — ROPINIROLE HYDROCHLORIDE 1 MG: 1 TABLET, FILM COATED ORAL at 08:46

## 2023-11-25 RX ADMIN — SEVELAMER CARBONATE 800 MG: 800 TABLET, FILM COATED ORAL at 17:01

## 2023-11-25 RX ADMIN — MIDODRINE HYDROCHLORIDE 5 MG: 5 TABLET ORAL at 12:09

## 2023-11-25 RX ADMIN — MIDODRINE HYDROCHLORIDE 5 MG: 5 TABLET ORAL at 17:01

## 2023-11-25 RX ADMIN — IPRATROPIUM BROMIDE AND ALBUTEROL SULFATE 1 DOSE: .5; 2.5 SOLUTION RESPIRATORY (INHALATION) at 05:56

## 2023-11-25 RX ADMIN — ROPINIROLE HYDROCHLORIDE 1 MG: 1 TABLET, FILM COATED ORAL at 20:34

## 2023-11-25 RX ADMIN — BUDESONIDE INHALATION 500 MCG: 0.5 SUSPENSION RESPIRATORY (INHALATION) at 16:54

## 2023-11-25 RX ADMIN — OXYCODONE AND ACETAMINOPHEN 1 TABLET: 5; 325 TABLET ORAL at 20:33

## 2023-11-25 RX ADMIN — OXYCODONE AND ACETAMINOPHEN 1 TABLET: 5; 325 TABLET ORAL at 09:44

## 2023-11-25 RX ADMIN — ASPIRIN 81 MG CHEWABLE TABLET 81 MG: 81 TABLET CHEWABLE at 08:46

## 2023-11-25 RX ADMIN — ROPINIROLE HYDROCHLORIDE 1 MG: 1 TABLET, FILM COATED ORAL at 15:02

## 2023-11-25 RX ADMIN — PRIMIDONE 50 MG: 50 TABLET ORAL at 08:46

## 2023-11-25 RX ADMIN — ACETYLCYSTEINE 400 MG: 100 INHALANT RESPIRATORY (INHALATION) at 09:53

## 2023-11-25 RX ADMIN — PANTOPRAZOLE SODIUM 40 MG: 40 TABLET, DELAYED RELEASE ORAL at 12:09

## 2023-11-25 RX ADMIN — Medication 10 ML: at 20:35

## 2023-11-25 RX ADMIN — ACETYLCYSTEINE 400 MG: 100 INHALANT RESPIRATORY (INHALATION) at 05:58

## 2023-11-25 ASSESSMENT — PAIN DESCRIPTION - FREQUENCY: FREQUENCY: CONTINUOUS

## 2023-11-25 ASSESSMENT — PAIN SCALES - GENERAL
PAINLEVEL_OUTOF10: 5
PAINLEVEL_OUTOF10: 1
PAINLEVEL_OUTOF10: 1
PAINLEVEL_OUTOF10: 10
PAINLEVEL_OUTOF10: 7

## 2023-11-25 ASSESSMENT — PAIN DESCRIPTION - ORIENTATION
ORIENTATION: RIGHT;LEFT
ORIENTATION: RIGHT;LEFT

## 2023-11-25 ASSESSMENT — PAIN DESCRIPTION - PAIN TYPE: TYPE: CHRONIC PAIN

## 2023-11-25 ASSESSMENT — PAIN DESCRIPTION - LOCATION
LOCATION: GENERALIZED
LOCATION: GENERALIZED

## 2023-11-25 ASSESSMENT — PAIN DESCRIPTION - DESCRIPTORS
DESCRIPTORS: ACHING;DISCOMFORT
DESCRIPTORS: ACHING;DISCOMFORT;SHARP

## 2023-11-25 ASSESSMENT — PAIN SCALES - WONG BAKER
WONGBAKER_NUMERICALRESPONSE: 0
WONGBAKER_NUMERICALRESPONSE: 0

## 2023-11-25 ASSESSMENT — PAIN DESCRIPTION - ONSET: ONSET: ON-GOING

## 2023-11-25 NOTE — PROGRESS NOTES
[facility] MD Brandy Burnett MD Mikal Canning, MD Andee Ruffini, NORY Oden, GREG Willard, NORY  Progress Note    11/25/2023    SUBJECTIVE:   11/20: Seen while on dialysis. Tolerating therapy without issues. BP is stable. She is on the BiPAP. Denies dyspnea. Appetite is good. Would like HD again tomorrow. 11.21 seen in HD this the 2th treatment in a row . Tolerating UF BP stable on BIPAP    11/22 in bronchscopy lab charts reviewed    11/23 chart reviewed  On o2nc     11/25 comfortable on o2/nc lying flat feel better cxr clear    PROBLEM LIST:    Principal Problem:    Acute respiratory failure with hypoxia (HCC)  Resolved Problems:    * No resolved hospital problems. *         DIET:    ADULT DIET; Regular; 5 carb choices (75 gm/meal);  Low Sodium (2 gm)     MEDS (scheduled):    predniSONE  20 mg Oral Daily    acetylcysteine  4 mL Inhalation Q4H    ipratropium 0.5 mg-albuterol 2.5 mg  1 Dose Inhalation Q4H WA RT    vancomycin (VANCOCIN) intermittent dosing (placeholder)   Other RX Placeholder    linaclotide  290 mcg Oral QAM AC    epoetin maggi-epbx  30 Units/kg SubCUTAneous Once per day on Mon Wed Fri    cefepime  1,000 mg IntraVENous Q24H    entecavir  0.5 mg Oral Every Other Day    acetaminophen  1,000 mg Oral Once    sodium chloride flush  5-40 mL IntraVENous 2 times per day    aspirin  81 mg Oral Daily    citalopram  40 mg Oral Lunch    melatonin  5 mg Oral Nightly    midodrine  5 mg Oral TID WC    pantoprazole  40 mg Oral Lunch    primidone  50 mg Oral Daily    rOPINIRole  1 mg Oral BID    sevelamer  800 mg Oral TID WC    tacrolimus  3 mg Oral BID    budesonide  0.5 mg Nebulization BID RT       MEDS (infusions):   dextrose      dextrose      sodium chloride         MEDS (prn):  glucose, dextrose bolus **OR** dextrose bolus, glucagon (rDNA), dextrose, calcium carbonate, oxyCODONE-acetaminophen, bisacodyl, polyethylene glycol, fentanNYL, glucose,

## 2023-11-25 NOTE — PROGRESS NOTES
Date:  11/25/2023       Short Term/ Long Term   Goals   Was pt agreeable to Eval/treatment? Yes yes To be met in 5 days   Pain level    4/10  All over 9/10  Chronic pain all over    Bed Mobility  Using rails and head of bed elevated:     Rolling: Minimal assist of 1    Supine to sit: Minimal assist of 1    Sit to supine: Not assessed patient seated edge of bed    Scooting: Minimal assist of 1   Using rails and head of bed elevated:     Rolling: Supervision    Supine to sit: Minimal assist of 1   Sit to supine: Minimal assist of 1   Scooting: Minimal assist of 1    Rolling: Independent    Supine to sit:  Independent    Sit to supine: Independent    Scooting: Independent     Transfers Sit to stand: Minimal assist of 1   Sit to stand: Minimal assist of 1 Cues for hand placement and safety     Sit to stand: Independent     Ambulation     2x25 and 1 x 50  feet using  wheeled walker with Minimal progressing to supervision   tremors noted and po2 monitored both which impair distance 2 x 60 feet using  wheeled walker with Minimal assist of 1   cues for upright posture, increased base of support, increased step length, safety, pacing, and pursed lip breathing     50 feet using  wheeled walker with Supervision     Stair negotiation: ascended and descended   Not assessed     1 step, without rail, min a     ROM Within functional limits        Strength BUE:  refer to OT eval  RLE:  3+/5  LLE:  3+/5  Increase strength in affected mm groups by 1/3 grade   Balance Sitting EOB:  fair    Dynamic Standing:  fair minus with wheeled walker  Sitting EOB: good -  Dynamic Standing: fair with wheeled walker   Sitting EOB:  good    Dynamic Standing: good       Patient is Alert & Oriented x person, place, time, and situation and follows directions    Sensation:  Patient  reports neuropathy bilateral lower extremities and bilateral upper extremities     Edema:  no   Endurance: poor      Vitals:  5 liters high flow nasal cannula            Blood Pressure at rest  Blood Pressure during session    Heart Rate at rest  Heart Rate during session    SPO2 at rest 97%  SPO2 during session 84-92% on 6L     Patient education  Patient educated on role of Physical Therapy, risks of immobility, safety and plan of care, importance of positional changes for oxygen exchange,  importance of mobility while in hospital , safety , and O2 line management and safety      Patient response to education:   Pt verbalized understanding Pt demonstrated skill Pt requires further education in this area   Yes Partial Yes      Treatment:  Patient practiced and was instructed/facilitated in the following treatment: Patient performed supine exercises. Pt assisted to edge of bed,   Sat edge of bed 10 minutes with Supervision  to increase dynamic sitting balance and activity tolerance. Pt stood, ambulated in the hallway, needing a seated rest period in the waiting room, and back to her bed. Therapeutic Exercises:  ankle pumps, quad sets, heel slide, hip abduction/adduction, and straight leg raise,  x 15 reps. AROM     At end of session, patient in bed with     call light and phone within reach,  all lines and tubes intact, nursing notified. Patient would benefit from continued skilled Physical Therapy to improve functional independence and quality of life. Patient's/ family goals   home    Time in  08:57  Time out  09:40    Total Treatment Time  43 minutes        CPT codes:    Therapeutic activities (99324)   29 minutes  2 unit(s)  Therapeutic exercises (84846)   14 minutes  1 unit(s)    Buddy Lawrence  [facility]  LIC # [id number]

## 2023-11-25 NOTE — PROGRESS NOTES
Assessment and Plan  Patient is a 63 y.o. female with the following medical Problems:   Acute on chronic hypoxic respiratory failure requiring BiPAP. Acute pulmonary edema  Atelectasis  COPD with mild exacerbation  Suspected healthcare associated pneumonia  Pancytopenia  Severe secondary pulmonary hypertension  Morbid obesity  Obstructive sleep apnea on PAP  End-stage renal disease on hemodialysis  S/P liver transplant  Heart failure with preserved ejection fraction  Valvular heart disease  Former nicotine abuse  Hypertension  Dyslipidemia  Peripheral neuropathy  Depression  Anxiety  Splenomegaly with suspected hypersplenism    Plan of care:  Optimize volume status with hemodialysis  Low-dose prednisone for acute exacerbation of COPD  Scheduled DuoNeb and scheduled Pulmicort  Mucomyst mobilizing secretions  Supplemental oxygen to keep sat 88 to 94%  Continue with cefepime and vancomycin. Vancomycin was stopped  Bronchoscopy on November 22, 2023 with extensive mucous plugs that were cleaned and suctioned. BAL obtained from left upper lobe, right middle lobe, right lower lobe. PCP was ruled out. Monitor fever curve and leukocytosis. Patient is pancytopenic. SCDs for DVT prophylaxis  Continue with immunosuppressive medication. We will consider Bactrim if respiratory status worsens. Incentive spirometer  GI prophylaxis  Strictly avoid benzodiazepine and opioids  Respiratory viral panel -ve. Cultures are negative today. PCP was ruled out. Chest x-ray 11/24/23 reviewed     History of Present Illness:   Patient is a 68-year-old woman with above-mentioned medical problems who is chronically on supplemental oxygen at 4 L/min. She was admitted on November 18, 2023 with progressive shortness of breath and productive cough of yellowish sputum. She was started on cefepime and vancomycin. Patient is chronically pancytopenic. Daily progress:  November 20, 2023: Patient continues to be on high flow oxygen.   She

## 2023-11-25 NOTE — PROGRESS NOTES
Vancomycin discontinued, pharmacy will sign off. Please re-consult if needed.     Thank you,  Willam Durant RPh 11/25/20233:10 PM

## 2023-11-25 NOTE — PROGRESS NOTES
Internal Medicine Progress Note    [facility]    Torrey Kraft. Kyleigh Nair., TREE.KATHYONachoI. Montserrat Dacosta D.O., ROBERTOELIDA Foster D.O. Jennifer Szymanski, MSN, APRN, NP-KIKO Collins. Rachel Rai, MSN, APRN-CNP  Tianna Joy, MSN, APRN-CNP     Primary Care Physician: Clint Blackwood DO   Admitting Physician:  Arya Barr DO  Admission date and time: 11/18/2023  5:59 PM    Room:  [address] [place] [address]  Admitting diagnosis: Peripheral edema [R60.9]  Acute respiratory failure with hypoxia [facility]) [J96.01]    Patient Name: Meena Melissa  MRN: [id number]    Date of Service: 11/25/2023     Subjective:  Zari Mcneal is a 63 y.o. female who was seen and examined today,11/25/2023, at the bedside. Zari Mcneal was sitting up in bed eating lunch. She remains on 5 L oxygen nasal cannula and denies any shortness of breath. She is complaining about increasing leg cramping will increase her Requip. Review of System:   Constitutional:   Denies fever or chills, weight loss or gain, positive fatigue and malaise  HEENT:   Denies ear pain, sore throat, sinus or eye problems. Cardiovascular:   Denies any chest pain, irregular heartbeats, or palpitations. Respiratory:   Denies  coughing, sputum production, hemoptysis, or wheezing. Reports shortness of breath improving. Gastrointestinal:   Denies nausea, vomiting, diarrhea, or constipation. Denies any abdominal pain. Genitourinary:    Denies any urgency, frequency, hematuria. Voiding  without difficulty. Extremities:   Denies lower extremity swelling, edema or cyanosis. Neurology:    Denies any headache or focal neurological deficits, Denies generalized weakness or memory difficulty. Psch:   Denies being anxious or depressed. Musculoskeletal:    Reports generalized body aches-improving. Integumentary:   Denies any rashes, ulcers, or excoriations. Denies bruising.   Hematologic/Lymphatic:  Denies bruising or with Mucomyst  Continue Rocephin  Continue Prograft  Case management arranging wheelchair for home and home oxygen for pending discharge    Continue current therapy. See orders for further plan of care. More than 50% of my  time was spent at the bedside counseling/coordinating care with the patient and/or family with face to face contact. This time was spent reviewing notes and laboratory data as well as instructing and counseling the patient. Time I spent with the family or surrogate(s) is included only if the patient was incapable of providing the necessary information or participating in medical decisions. I also discussed the differential diagnosis and all of the proposed management plans with the patient and individuals accompanying the patient. The patient was seen, examined and then discussed with Dr. Oneil Velázquez. NELSON Kennedy CNP  11/25/2023  3:12 PM        I saw and evaluated the patient. I agree with the findings and the plan of care as documented in Romain ALEJANDRA note.     Ron Leblanc DO, MANNIE  3:36 PM  11/25/2023

## 2023-11-26 LAB
ALBUMIN SERPL-MCNC: 3.5 G/DL (ref 3.5–5.2)
ALP SERPL-CCNC: 61 U/L (ref 35–104)
ALT SERPL-CCNC: 26 U/L (ref 0–32)
ANION GAP SERPL CALCULATED.3IONS-SCNC: 11 MMOL/L (ref 7–16)
AST SERPL-CCNC: 21 U/L (ref 0–31)
BASOPHILS # BLD: 0.01 K/UL (ref 0–0.2)
BASOPHILS NFR BLD: 0 % (ref 0–2)
BILIRUB SERPL-MCNC: 0.5 MG/DL (ref 0–1.2)
BUN SERPL-MCNC: 68 MG/DL (ref 6–23)
CALCIUM SERPL-MCNC: 8.8 MG/DL (ref 8.6–10.2)
CHLORIDE SERPL-SCNC: 90 MMOL/L (ref 98–107)
CO2 SERPL-SCNC: 26 MMOL/L (ref 22–29)
CREAT SERPL-MCNC: 6.9 MG/DL (ref 0.5–1)
EOSINOPHIL # BLD: 0.26 K/UL (ref 0.05–0.5)
EOSINOPHILS RELATIVE PERCENT: 3 % (ref 0–6)
ERYTHROCYTE [DISTWIDTH] IN BLOOD BY AUTOMATED COUNT: 14.7 % (ref 11.5–15)
GFR SERPL CREATININE-BSD FRML MDRD: 6 ML/MIN/1.73M2
GLUCOSE BLD-MCNC: 239 MG/DL (ref 74–99)
GLUCOSE BLD-MCNC: 251 MG/DL (ref 74–99)
GLUCOSE BLD-MCNC: 293 MG/DL (ref 74–99)
GLUCOSE BLD-MCNC: 295 MG/DL (ref 74–99)
GLUCOSE SERPL-MCNC: 118 MG/DL (ref 74–99)
HCT VFR BLD AUTO: 27.4 % (ref 34–48)
HGB BLD-MCNC: 9.2 G/DL (ref 11.5–15.5)
IMM GRANULOCYTES # BLD AUTO: 0.5 K/UL (ref 0–0.58)
IMM GRANULOCYTES NFR BLD: 5 % (ref 0–5)
LYMPHOCYTES NFR BLD: 1.53 K/UL (ref 1.5–4)
LYMPHOCYTES RELATIVE PERCENT: 15 % (ref 20–42)
MCH RBC QN AUTO: 34.3 PG (ref 26–35)
MCHC RBC AUTO-ENTMCNC: 33.6 G/DL (ref 32–34.5)
MCV RBC AUTO: 102.2 FL (ref 80–99.9)
MONOCYTES NFR BLD: 0.62 K/UL (ref 0.1–0.95)
MONOCYTES NFR BLD: 6 % (ref 2–12)
NEUTROPHILS NFR BLD: 72 % (ref 43–80)
NEUTS SEG NFR BLD: 7.51 K/UL (ref 1.8–7.3)
PLATELET # BLD AUTO: 152 K/UL (ref 130–450)
PMV BLD AUTO: 10.1 FL (ref 7–12)
POTASSIUM SERPL-SCNC: 4.2 MMOL/L (ref 3.5–5)
PROT SERPL-MCNC: 6.4 G/DL (ref 6.4–8.3)
RBC # BLD AUTO: 2.68 M/UL (ref 3.5–5.5)
SODIUM SERPL-SCNC: 127 MMOL/L (ref 132–146)
WBC OTHER # BLD: 10.4 K/UL (ref 4.5–11.5)

## 2023-11-26 PROCEDURE — 6370000000 HC RX 637 (ALT 250 FOR IP)

## 2023-11-26 PROCEDURE — 85025 COMPLETE CBC W/AUTO DIFF WBC: CPT

## 2023-11-26 PROCEDURE — 80053 COMPREHEN METABOLIC PANEL: CPT

## 2023-11-26 PROCEDURE — 6370000000 HC RX 637 (ALT 250 FOR IP): Performed by: INTERNAL MEDICINE

## 2023-11-26 PROCEDURE — 6360000002 HC RX W HCPCS: Performed by: INTERNAL MEDICINE

## 2023-11-26 PROCEDURE — 99232 SBSQ HOSP IP/OBS MODERATE 35: CPT | Performed by: INTERNAL MEDICINE

## 2023-11-26 PROCEDURE — 2580000003 HC RX 258: Performed by: INTERNAL MEDICINE

## 2023-11-26 PROCEDURE — 94660 CPAP INITIATION&MGMT: CPT

## 2023-11-26 PROCEDURE — 94640 AIRWAY INHALATION TREATMENT: CPT

## 2023-11-26 PROCEDURE — 82962 GLUCOSE BLOOD TEST: CPT

## 2023-11-26 PROCEDURE — 2700000000 HC OXYGEN THERAPY PER DAY

## 2023-11-26 PROCEDURE — 2060000000 HC ICU INTERMEDIATE R&B

## 2023-11-26 PROCEDURE — 36415 COLL VENOUS BLD VENIPUNCTURE: CPT

## 2023-11-26 RX ADMIN — TACROLIMUS 3 MG: 1 CAPSULE ORAL at 20:51

## 2023-11-26 RX ADMIN — ACETYLCYSTEINE 400 MG: 100 INHALANT RESPIRATORY (INHALATION) at 21:11

## 2023-11-26 RX ADMIN — CITALOPRAM HYDROBROMIDE 40 MG: 20 TABLET ORAL at 12:22

## 2023-11-26 RX ADMIN — ROPINIROLE HYDROCHLORIDE 1 MG: 1 TABLET, FILM COATED ORAL at 08:09

## 2023-11-26 RX ADMIN — PRIMIDONE 50 MG: 50 TABLET ORAL at 08:09

## 2023-11-26 RX ADMIN — IPRATROPIUM BROMIDE AND ALBUTEROL SULFATE 1 DOSE: .5; 2.5 SOLUTION RESPIRATORY (INHALATION) at 13:08

## 2023-11-26 RX ADMIN — OXYCODONE AND ACETAMINOPHEN 1 TABLET: 5; 325 TABLET ORAL at 19:34

## 2023-11-26 RX ADMIN — MIDODRINE HYDROCHLORIDE 5 MG: 5 TABLET ORAL at 12:22

## 2023-11-26 RX ADMIN — SEVELAMER CARBONATE 800 MG: 800 TABLET, FILM COATED ORAL at 16:41

## 2023-11-26 RX ADMIN — OXYCODONE AND ACETAMINOPHEN 1 TABLET: 5; 325 TABLET ORAL at 04:23

## 2023-11-26 RX ADMIN — ROPINIROLE HYDROCHLORIDE 1 MG: 1 TABLET, FILM COATED ORAL at 16:41

## 2023-11-26 RX ADMIN — ACETYLCYSTEINE 400 MG: 100 INHALANT RESPIRATORY (INHALATION) at 13:08

## 2023-11-26 RX ADMIN — IPRATROPIUM BROMIDE AND ALBUTEROL SULFATE 1 DOSE: .5; 2.5 SOLUTION RESPIRATORY (INHALATION) at 09:15

## 2023-11-26 RX ADMIN — ROPINIROLE HYDROCHLORIDE 1 MG: 1 TABLET, FILM COATED ORAL at 20:51

## 2023-11-26 RX ADMIN — IPRATROPIUM BROMIDE AND ALBUTEROL SULFATE 1 DOSE: .5; 2.5 SOLUTION RESPIRATORY (INHALATION) at 17:01

## 2023-11-26 RX ADMIN — IPRATROPIUM BROMIDE AND ALBUTEROL SULFATE 1 DOSE: .5; 2.5 SOLUTION RESPIRATORY (INHALATION) at 21:12

## 2023-11-26 RX ADMIN — TACROLIMUS 3 MG: 1 CAPSULE ORAL at 08:09

## 2023-11-26 RX ADMIN — ACETYLCYSTEINE 400 MG: 100 INHALANT RESPIRATORY (INHALATION) at 17:02

## 2023-11-26 RX ADMIN — BUDESONIDE INHALATION 500 MCG: 0.5 SUSPENSION RESPIRATORY (INHALATION) at 05:59

## 2023-11-26 RX ADMIN — SEVELAMER CARBONATE 800 MG: 800 TABLET, FILM COATED ORAL at 08:08

## 2023-11-26 RX ADMIN — ASPIRIN 81 MG CHEWABLE TABLET 81 MG: 81 TABLET CHEWABLE at 08:08

## 2023-11-26 RX ADMIN — PANTOPRAZOLE SODIUM 40 MG: 40 TABLET, DELAYED RELEASE ORAL at 12:22

## 2023-11-26 RX ADMIN — PREDNISONE 20 MG: 20 TABLET ORAL at 08:08

## 2023-11-26 RX ADMIN — SEVELAMER CARBONATE 800 MG: 800 TABLET, FILM COATED ORAL at 12:22

## 2023-11-26 RX ADMIN — ACETYLCYSTEINE 400 MG: 100 INHALANT RESPIRATORY (INHALATION) at 05:59

## 2023-11-26 RX ADMIN — BUDESONIDE INHALATION 500 MCG: 0.5 SUSPENSION RESPIRATORY (INHALATION) at 17:01

## 2023-11-26 RX ADMIN — MIDODRINE HYDROCHLORIDE 5 MG: 5 TABLET ORAL at 08:09

## 2023-11-26 RX ADMIN — ENTECAVIR 0.5 MG: 0.5 TABLET ORAL at 08:08

## 2023-11-26 RX ADMIN — ACETYLCYSTEINE 400 MG: 100 INHALANT RESPIRATORY (INHALATION) at 09:14

## 2023-11-26 RX ADMIN — IPRATROPIUM BROMIDE AND ALBUTEROL SULFATE 1 DOSE: .5; 2.5 SOLUTION RESPIRATORY (INHALATION) at 05:59

## 2023-11-26 RX ADMIN — Medication 10 ML: at 08:10

## 2023-11-26 RX ADMIN — MIDODRINE HYDROCHLORIDE 5 MG: 5 TABLET ORAL at 16:41

## 2023-11-26 RX ADMIN — Medication 5 MG: at 20:51

## 2023-11-26 RX ADMIN — Medication 10 ML: at 20:54

## 2023-11-26 ASSESSMENT — PAIN DESCRIPTION - DESCRIPTORS
DESCRIPTORS: ACHING;DISCOMFORT
DESCRIPTORS: ACHING;DISCOMFORT;SORE

## 2023-11-26 ASSESSMENT — PAIN - FUNCTIONAL ASSESSMENT
PAIN_FUNCTIONAL_ASSESSMENT: ACTIVITIES ARE NOT PREVENTED
PAIN_FUNCTIONAL_ASSESSMENT: PREVENTS OR INTERFERES SOME ACTIVE ACTIVITIES AND ADLS

## 2023-11-26 ASSESSMENT — PAIN DESCRIPTION - FREQUENCY
FREQUENCY: CONTINUOUS
FREQUENCY: CONTINUOUS

## 2023-11-26 ASSESSMENT — PAIN SCALES - GENERAL
PAINLEVEL_OUTOF10: 7
PAINLEVEL_OUTOF10: 9
PAINLEVEL_OUTOF10: 6
PAINLEVEL_OUTOF10: 10
PAINLEVEL_OUTOF10: 10

## 2023-11-26 ASSESSMENT — PAIN DESCRIPTION - LOCATION
LOCATION: GENERALIZED
LOCATION: GENERALIZED

## 2023-11-26 ASSESSMENT — PAIN DESCRIPTION - ORIENTATION
ORIENTATION: RIGHT;LEFT
ORIENTATION: RIGHT;LEFT

## 2023-11-26 ASSESSMENT — PAIN DESCRIPTION - ONSET
ONSET: ON-GOING
ONSET: ON-GOING

## 2023-11-26 ASSESSMENT — PAIN DESCRIPTION - PAIN TYPE
TYPE: CHRONIC PAIN
TYPE: CHRONIC PAIN

## 2023-11-26 NOTE — PROGRESS NOTES
Assessment and Plan  Patient is a 63 y.o. female with the following medical Problems:   Acute on chronic hypoxic respiratory failure requiring BiPAP. Acute pulmonary edema  Atelectasis  COPD with mild exacerbation  Suspected healthcare associated pneumonia  Pancytopenia  Severe secondary pulmonary hypertension  Morbid obesity  Obstructive sleep apnea on PAP  End-stage renal disease on hemodialysis  S/P liver transplant  Heart failure with preserved ejection fraction  Valvular heart disease  Former nicotine abuse  Hypertension  Dyslipidemia  Peripheral neuropathy  Depression  Anxiety  Splenomegaly with suspected hypersplenism    Plan of care:  Optimize volume status with hemodialysis  Low-dose prednisone for acute exacerbation of COPD  Scheduled DuoNeb and scheduled Pulmicort  Mucomyst mobilizing secretions  Supplemental oxygen to keep sat 88 to 94%  Cefepime was stopped. Vancomycin was stopped  Bronchoscopy on November 22, 2023 with extensive mucous plugs that were cleaned and suctioned. BAL obtained from left upper lobe, right middle lobe, right lower lobe. PCP was ruled out. Monitor fever curve and leukocytosis. Patient is pancytopenic. SCDs for DVT prophylaxis  Continue with immunosuppressive medication. We will consider Bactrim if respiratory status worsens. Incentive spirometer  GI prophylaxis  Strictly avoid benzodiazepine and opioids  Respiratory viral panel -ve. Cultures are negative today. PCP was ruled out. Chest x-ray 11/24/23 reviewed   We will sign off please call with questions. History of Present Illness:   Patient is a 45-year-old woman with above-mentioned medical problems who is chronically on supplemental oxygen at 4 L/min. She was admitted on November 18, 2023 with progressive shortness of breath and productive cough of yellowish sputum. She was started on cefepime and vancomycin. Patient is chronically pancytopenic.     Daily progress:  November 20, 2023: Patient continues to be Pulses are equally present. Skin: intact, no rashes   Neurologic: Alert and oriented x 3, No focal deficit     Investigations:  Labs, radiological imaging and cardiac work up were reviewed        STAFF PHYSICIAN NOTE OF PERSONAL INVOLVEMENT IN CARE  As the attending physician, I certify that I personally reviewed the patient's history and personally examined the patient to confirm the physical findings described above, and that I reviewed the relevant imaging studies and available reports. I also discussed the differential diagnosis and all of the proposed management plans with the patient and individuals accompanying the patient to this visit. They had the opportunity to ask questions about the proposed management plans and to have those questions answered.       Electronically signed by Thania Christiansen MD on 11/26/2023 at 1:24 PM

## 2023-11-26 NOTE — PLAN OF CARE
Problem: Discharge Planning  Goal: Discharge to home or other facility with appropriate resources  11/26/2023 1123 by Dillan Steiner RN  Outcome: Progressing  11/25/2023 2225 by Jaylen Jolly RN  Outcome: Progressing     Problem: Safety - Adult  Goal: Free from fall injury  11/26/2023 1123 by Dillan Steiner RN  Outcome: Progressing  11/25/2023 2225 by Jaylen Jolly RN  Outcome: Progressing     Problem: Chronic Conditions and Co-morbidities  Goal: Patient's chronic conditions and co-morbidity symptoms are monitored and maintained or improved  11/26/2023 1123 by Dillan Steiner RN  Outcome: Progressing  11/25/2023 2225 by Jaylen Jolly RN  Outcome: Progressing     Problem: Pain  Goal: Verbalizes/displays adequate comfort level or baseline comfort level  11/26/2023 1123 by Dillan Steiner RN  Outcome: Progressing  11/25/2023 2225 by Jaylen Jolly RN  Outcome: Progressing     Problem: Skin/Tissue Integrity  Goal: Absence of new skin breakdown  Description: 1. Monitor for areas of redness and/or skin breakdown  2. Assess vascular access sites hourly  3. Every 4-6 hours minimum:  Change oxygen saturation probe site  4. Every 4-6 hours:  If on nasal continuous positive airway pressure, respiratory therapy assess nares and determine need for appliance change or resting period.   11/26/2023 1123 by Dillan Steiner RN  Outcome: Progressing  11/25/2023 2225 by Jaylen Jolly RN  Outcome: Progressing

## 2023-11-26 NOTE — PROGRESS NOTES
Department of Internal Medicine  PN    PCP: Jose Irvin DO  Admitting Physician: Dr. Ashley Hernandez  Consultants:   Date of Service: 11/18/2023    CHIEF COMPLAINT:  sob    HISTORY OF PRESENT ILLNESS:    Patient is 72-year-old female presented to the ED with shortness of breath. Patient states she has chronic shortness of breath she is on 4 L of supplemental oxygen at baseline. However over the last 3 days she has been increasingly more short of breath. She does not admit to significant increase in cough or sputum production. He does she does have orthopnea. She does admit to increased fatigue and generalized body tenderness. She also admits to headache and neck pain. States that she has diminished appetite over the last few days due to sickness. 11/19/2023  Patient seen examined on [facility]. Case discussed with pulmonologist today. BUN/creatinine 40/6.7 potassium 5.1. Liver enzymes normal with. Patient had proBNP on admission greater than 70,000. WBC 1.4 with hemoglobin 8 and platelet count of 62. Temperature 97.3 with heart rate 66 and blood pressure 117/56. O2 sat 98% with the patient on BiPAP FiO2 60%. Chest x-ray on admission was unremarkable with a CTA showing no evidence of PE with atelectasis with this discrete area of atelectasis and suspected minimal groundglass opacity right upper lobe. With patient's procalcitonin increased to 0.45 we will start antibiotics with vancomycin and cefepime with the patient just finishing up doxycycline and Omnicef. Pulmonology to evaluate for possible HCAP versus patient respiratory status purely by volume overload and with her renal failure and hemodialysis. 11/20/2023  Patient seen and examined on [facility]. Patient is on the BiPAP feels better. Patient duration of breath with any activity. She denies any current chest or abdominal pain. Case discussed with pulmonology today. Electrolytes are stable with blood sugars ranging 161-239.   He is normal with a 06:54 AM    ALKPHOS 61 11/26/2023 06:54 AM    AST 21 11/26/2023 06:54 AM    ALT 26 11/26/2023 06:54 AM       ASSESSMENT/PLAN:  Acute on chronic hypoxic respiratory failure  COPD exacerbation  Severe pulmonary hypertension  Bronchoscopy 11/22 showed extensive mucous plugs  Groundglass opacities-possible healthcare associated pneumonia  Hx of dvt  ESRD on Dialysis   History of liver transplant  Splenomegaly  Chronic macrocytic anemia  Chronic diastolic congestive heart failure  Moderate tricuspid regurgitation  Mild aortic regurgitation  MAVERICK without use of CPAP  Hyperlipidemia  Hypertension  Peripheral neuropathy  Depression/anxiety  History of tobacco abuse        Patient presented with shortness of breath. Patient found to be acutely hypoxic on 2 L of supplemental oxygen currently. States that deep breaths increase the pain or she has pain with deep breaths. Initial work-up did not reveal any pneumonia. It did reveal pneumonitis. Patient given IV steroids and nebulized breathing treatments which will be continued. Fentanyl for pain. We will consider IV diuretics as well. Respiratory panel ordered. Home medication reviewed  Consult pulmonology  Start IV vancomycin and cefepime until seen by pulmonology  Repeat procalcitonin  Patient receiving dialysis 11/19, 4/20, 11/21    Bronchoscopy 11/22    Discharge home when okay with pulmonology    BMP, CBC in a.m.       Samina Rodriguez DO  12:13 PM  11/26/2023

## 2023-11-26 NOTE — PROGRESS NOTES
[facility] MD Myron Pastor, MD Li Maxwell, NORY Oden, ANP  Loren Guaman, NORY  Progress Note    11/26/2023    SUBJECTIVE:   11/20: Seen while on dialysis. Tolerating therapy without issues. BP is stable. She is on the BiPAP. Denies dyspnea. Appetite is good. Would like HD again tomorrow. 11.21 seen in HD this the 2th treatment in a row . Tolerating UF BP stable on BIPAP    11/22 in bronchscopy lab charts reviewed    11/23 chart reviewed  On o2nc     11/25 comfortable on o2/nc lying flat feel better cxr clear    11/26 clinically stable   Next HD planned for am     PROBLEM LIST:    Principal Problem:    Acute respiratory failure with hypoxia ([address])  Resolved Problems:    * No resolved hospital problems. *         DIET:    ADULT DIET; Regular; 5 carb choices (75 gm/meal);  Low Sodium (2 gm)     MEDS (scheduled):    rOPINIRole  1 mg Oral TID    acetylcysteine  4 mL Inhalation Q4H While awake    predniSONE  20 mg Oral Daily    ipratropium 0.5 mg-albuterol 2.5 mg  1 Dose Inhalation Q4H WA RT    linaclotide  290 mcg Oral QAM AC    epoetin maggi-epbx  30 Units/kg SubCUTAneous Once per day on Mon Wed Fri    entecavir  0.5 mg Oral Every Other Day    sodium chloride flush  5-40 mL IntraVENous 2 times per day    aspirin  81 mg Oral Daily    citalopram  40 mg Oral Lunch    melatonin  5 mg Oral Nightly    midodrine  5 mg Oral TID WC    pantoprazole  40 mg Oral Lunch    primidone  50 mg Oral Daily    sevelamer  800 mg Oral TID WC    tacrolimus  3 mg Oral BID    budesonide  0.5 mg Nebulization BID RT       MEDS (infusions):   dextrose      dextrose      sodium chloride         MEDS (prn):  glucose, dextrose bolus **OR** dextrose bolus, glucagon (rDNA), dextrose, calcium carbonate, oxyCODONE-acetaminophen, bisacodyl, polyethylene glycol, fentanNYL, glucose, dextrose bolus **OR** dextrose bolus, glucagon (rDNA), dextrose, sodium chloride flush, sodium

## 2023-11-26 NOTE — PLAN OF CARE
Problem: Discharge Planning  Goal: Discharge to home or other facility with appropriate resources  11/25/2023 2225 by Krystle Nicholas RN  Outcome: Progressing     Problem: Safety - Adult  Goal: Free from fall injury  11/25/2023 2225 by Krystle Nicholas RN  Outcome: Progressing     Problem: Chronic Conditions and Co-morbidities  Goal: Patient's chronic conditions and co-morbidity symptoms are monitored and maintained or improved  11/25/2023 2225 by Krystle Nicholas RN  Outcome: Progressing     Problem: Pain  Goal: Verbalizes/displays adequate comfort level or baseline comfort level  11/25/2023 2225 by Krystle Nicholas RN  Outcome: Progressing     Problem: Skin/Tissue Integrity  Goal: Absence of new skin breakdown  Description: 1. Monitor for areas of redness and/or skin breakdown  2. Assess vascular access sites hourly  3. Every 4-6 hours minimum:  Change oxygen saturation probe site  4. Every 4-6 hours:  If on nasal continuous positive airway pressure, respiratory therapy assess nares and determine need for appliance change or resting period.   11/25/2023 2225 by Krystle Nicholas RN  Outcome: Progressing

## 2023-11-27 LAB
ALBUMIN SERPL-MCNC: 3.5 G/DL (ref 3.5–5.2)
ALP SERPL-CCNC: 60 U/L (ref 35–104)
ALT SERPL-CCNC: 24 U/L (ref 0–32)
ANION GAP SERPL CALCULATED.3IONS-SCNC: 15 MMOL/L (ref 7–16)
AST SERPL-CCNC: 18 U/L (ref 0–31)
BASOPHILS # BLD: 0.01 K/UL (ref 0–0.2)
BASOPHILS NFR BLD: 0 % (ref 0–2)
BILIRUB SERPL-MCNC: 0.4 MG/DL (ref 0–1.2)
BUN SERPL-MCNC: 86 MG/DL (ref 6–23)
CALCIUM SERPL-MCNC: 8.8 MG/DL (ref 8.6–10.2)
CHLORIDE SERPL-SCNC: 91 MMOL/L (ref 98–107)
CO2 SERPL-SCNC: 25 MMOL/L (ref 22–29)
CREAT SERPL-MCNC: 8 MG/DL (ref 0.5–1)
EOSINOPHIL # BLD: 0.29 K/UL (ref 0.05–0.5)
EOSINOPHILS RELATIVE PERCENT: 3 % (ref 0–6)
ERYTHROCYTE [DISTWIDTH] IN BLOOD BY AUTOMATED COUNT: 14.9 % (ref 11.5–15)
GFR SERPL CREATININE-BSD FRML MDRD: 5 ML/MIN/1.73M2
GLUCOSE BLD-MCNC: 114 MG/DL (ref 74–99)
GLUCOSE BLD-MCNC: 172 MG/DL (ref 74–99)
GLUCOSE BLD-MCNC: 186 MG/DL (ref 74–99)
GLUCOSE BLD-MCNC: 294 MG/DL (ref 74–99)
GLUCOSE SERPL-MCNC: 167 MG/DL (ref 74–99)
HCT VFR BLD AUTO: 26.9 % (ref 34–48)
HGB BLD-MCNC: 9.3 G/DL (ref 11.5–15.5)
IMM GRANULOCYTES # BLD AUTO: 0.39 K/UL (ref 0–0.58)
IMM GRANULOCYTES NFR BLD: 5 % (ref 0–5)
LYMPHOCYTES NFR BLD: 0.87 K/UL (ref 1.5–4)
LYMPHOCYTES RELATIVE PERCENT: 10 % (ref 20–42)
MCH RBC QN AUTO: 35 PG (ref 26–35)
MCHC RBC AUTO-ENTMCNC: 34.6 G/DL (ref 32–34.5)
MCV RBC AUTO: 101.1 FL (ref 80–99.9)
MONOCYTES NFR BLD: 0.55 K/UL (ref 0.1–0.95)
MONOCYTES NFR BLD: 6 % (ref 2–12)
NEUTROPHILS NFR BLD: 76 % (ref 43–80)
NEUTS SEG NFR BLD: 6.55 K/UL (ref 1.8–7.3)
PLATELET # BLD AUTO: 116 K/UL (ref 130–450)
PMV BLD AUTO: 9.9 FL (ref 7–12)
POTASSIUM SERPL-SCNC: 4.9 MMOL/L (ref 3.5–5)
PROT SERPL-MCNC: 6.5 G/DL (ref 6.4–8.3)
RBC # BLD AUTO: 2.66 M/UL (ref 3.5–5.5)
SODIUM SERPL-SCNC: 131 MMOL/L (ref 132–146)
WBC OTHER # BLD: 8.7 K/UL (ref 4.5–11.5)

## 2023-11-27 PROCEDURE — 6370000000 HC RX 637 (ALT 250 FOR IP): Performed by: INTERNAL MEDICINE

## 2023-11-27 PROCEDURE — 80053 COMPREHEN METABOLIC PANEL: CPT

## 2023-11-27 PROCEDURE — 94640 AIRWAY INHALATION TREATMENT: CPT

## 2023-11-27 PROCEDURE — 76937 US GUIDE VASCULAR ACCESS: CPT

## 2023-11-27 PROCEDURE — 97110 THERAPEUTIC EXERCISES: CPT

## 2023-11-27 PROCEDURE — 6360000002 HC RX W HCPCS: Performed by: INTERNAL MEDICINE

## 2023-11-27 PROCEDURE — 2700000000 HC OXYGEN THERAPY PER DAY

## 2023-11-27 PROCEDURE — 82962 GLUCOSE BLOOD TEST: CPT

## 2023-11-27 PROCEDURE — 2580000003 HC RX 258: Performed by: INTERNAL MEDICINE

## 2023-11-27 PROCEDURE — 97530 THERAPEUTIC ACTIVITIES: CPT

## 2023-11-27 PROCEDURE — 2060000000 HC ICU INTERMEDIATE R&B

## 2023-11-27 PROCEDURE — 6370000000 HC RX 637 (ALT 250 FOR IP)

## 2023-11-27 PROCEDURE — 36415 COLL VENOUS BLD VENIPUNCTURE: CPT

## 2023-11-27 PROCEDURE — 94660 CPAP INITIATION&MGMT: CPT

## 2023-11-27 PROCEDURE — 90935 HEMODIALYSIS ONE EVALUATION: CPT

## 2023-11-27 PROCEDURE — 85025 COMPLETE CBC W/AUTO DIFF WBC: CPT

## 2023-11-27 RX ADMIN — SEVELAMER CARBONATE 800 MG: 800 TABLET, FILM COATED ORAL at 17:06

## 2023-11-27 RX ADMIN — BUDESONIDE INHALATION 500 MCG: 0.5 SUSPENSION RESPIRATORY (INHALATION) at 06:32

## 2023-11-27 RX ADMIN — EPOETIN ALFA-EPBX 3000 UNITS: 3000 INJECTION, SOLUTION INTRAVENOUS; SUBCUTANEOUS at 18:04

## 2023-11-27 RX ADMIN — IPRATROPIUM BROMIDE AND ALBUTEROL SULFATE 1 DOSE: .5; 2.5 SOLUTION RESPIRATORY (INHALATION) at 18:56

## 2023-11-27 RX ADMIN — Medication 10 ML: at 21:22

## 2023-11-27 RX ADMIN — PANTOPRAZOLE SODIUM 40 MG: 40 TABLET, DELAYED RELEASE ORAL at 11:51

## 2023-11-27 RX ADMIN — BUDESONIDE INHALATION 500 MCG: 0.5 SUSPENSION RESPIRATORY (INHALATION) at 18:57

## 2023-11-27 RX ADMIN — ACETYLCYSTEINE 400 MG: 100 INHALANT RESPIRATORY (INHALATION) at 13:40

## 2023-11-27 RX ADMIN — Medication 5 MG: at 21:16

## 2023-11-27 RX ADMIN — MIDODRINE HYDROCHLORIDE 5 MG: 5 TABLET ORAL at 07:37

## 2023-11-27 RX ADMIN — PREDNISONE 20 MG: 20 TABLET ORAL at 07:37

## 2023-11-27 RX ADMIN — IPRATROPIUM BROMIDE AND ALBUTEROL SULFATE 1 DOSE: .5; 2.5 SOLUTION RESPIRATORY (INHALATION) at 13:40

## 2023-11-27 RX ADMIN — ASPIRIN 81 MG CHEWABLE TABLET 81 MG: 81 TABLET CHEWABLE at 07:37

## 2023-11-27 RX ADMIN — ACETYLCYSTEINE 400 MG: 100 INHALANT RESPIRATORY (INHALATION) at 18:57

## 2023-11-27 RX ADMIN — MIDODRINE HYDROCHLORIDE 5 MG: 5 TABLET ORAL at 11:51

## 2023-11-27 RX ADMIN — OXYCODONE AND ACETAMINOPHEN 1 TABLET: 5; 325 TABLET ORAL at 03:50

## 2023-11-27 RX ADMIN — ROPINIROLE HYDROCHLORIDE 1 MG: 1 TABLET, FILM COATED ORAL at 07:37

## 2023-11-27 RX ADMIN — ACETYLCYSTEINE 400 MG: 100 INHALANT RESPIRATORY (INHALATION) at 06:32

## 2023-11-27 RX ADMIN — TACROLIMUS 3 MG: 1 CAPSULE ORAL at 07:37

## 2023-11-27 RX ADMIN — ROPINIROLE HYDROCHLORIDE 1 MG: 1 TABLET, FILM COATED ORAL at 21:16

## 2023-11-27 RX ADMIN — CITALOPRAM HYDROBROMIDE 40 MG: 20 TABLET ORAL at 11:51

## 2023-11-27 RX ADMIN — PRIMIDONE 50 MG: 50 TABLET ORAL at 07:37

## 2023-11-27 RX ADMIN — ROPINIROLE HYDROCHLORIDE 1 MG: 1 TABLET, FILM COATED ORAL at 14:12

## 2023-11-27 RX ADMIN — IPRATROPIUM BROMIDE AND ALBUTEROL SULFATE 1 DOSE: .5; 2.5 SOLUTION RESPIRATORY (INHALATION) at 06:32

## 2023-11-27 RX ADMIN — OXYCODONE AND ACETAMINOPHEN 1 TABLET: 5; 325 TABLET ORAL at 14:12

## 2023-11-27 RX ADMIN — SEVELAMER CARBONATE 800 MG: 800 TABLET, FILM COATED ORAL at 11:51

## 2023-11-27 RX ADMIN — Medication 10 ML: at 07:39

## 2023-11-27 RX ADMIN — OXYCODONE AND ACETAMINOPHEN 1 TABLET: 5; 325 TABLET ORAL at 23:48

## 2023-11-27 RX ADMIN — TACROLIMUS 3 MG: 1 CAPSULE ORAL at 21:17

## 2023-11-27 ASSESSMENT — PAIN - FUNCTIONAL ASSESSMENT
PAIN_FUNCTIONAL_ASSESSMENT: ACTIVITIES ARE NOT PREVENTED
PAIN_FUNCTIONAL_ASSESSMENT: ACTIVITIES ARE NOT PREVENTED

## 2023-11-27 ASSESSMENT — PAIN DESCRIPTION - DESCRIPTORS
DESCRIPTORS: ACHING;DISCOMFORT;SORE
DESCRIPTORS: ACHING;DISCOMFORT
DESCRIPTORS: ACHING;DISCOMFORT;SORE

## 2023-11-27 ASSESSMENT — PAIN DESCRIPTION - ONSET: ONSET: ON-GOING

## 2023-11-27 ASSESSMENT — PAIN DESCRIPTION - FREQUENCY: FREQUENCY: CONTINUOUS

## 2023-11-27 ASSESSMENT — PAIN SCALES - GENERAL
PAINLEVEL_OUTOF10: 7
PAINLEVEL_OUTOF10: 7
PAINLEVEL_OUTOF10: 5

## 2023-11-27 ASSESSMENT — PAIN SCALES - WONG BAKER: WONGBAKER_NUMERICALRESPONSE: 0

## 2023-11-27 ASSESSMENT — PAIN DESCRIPTION - ORIENTATION
ORIENTATION: RIGHT;LEFT

## 2023-11-27 ASSESSMENT — PAIN DESCRIPTION - LOCATION
LOCATION: GENERALIZED

## 2023-11-27 ASSESSMENT — PAIN DESCRIPTION - PAIN TYPE: TYPE: CHRONIC PAIN

## 2023-11-27 NOTE — PROGRESS NOTES
Department of Internal Medicine  PN    PCP: Bob Mars., DO  Admitting Physician: Dr. Jerzy Escamilla  Consultants:   Date of Service: 11/18/2023    CHIEF COMPLAINT:  sob    HISTORY OF PRESENT ILLNESS:    Patient is 53-year-old female presented to the ED with shortness of breath. Patient states she has chronic shortness of breath she is on 4 L of supplemental oxygen at baseline. However over the last 3 days she has been increasingly more short of breath. She does not admit to significant increase in cough or sputum production. He does she does have orthopnea. She does admit to increased fatigue and generalized body tenderness. She also admits to headache and neck pain. States that she has diminished appetite over the last few days due to sickness. 11/19/2023  Patient seen examined on [facility]. Case discussed with pulmonologist today. BUN/creatinine 40/6.7 potassium 5.1. Liver enzymes normal with. Patient had proBNP on admission greater than 70,000. WBC 1.4 with hemoglobin 8 and platelet count of 62. Temperature 97.3 with heart rate 66 and blood pressure 117/56. O2 sat 98% with the patient on BiPAP FiO2 60%. Chest x-ray on admission was unremarkable with a CTA showing no evidence of PE with atelectasis with this discrete area of atelectasis and suspected minimal groundglass opacity right upper lobe. With patient's procalcitonin increased to 0.45 we will start antibiotics with vancomycin and cefepime with the patient just finishing up doxycycline and Omnicef. Pulmonology to evaluate for possible HCAP versus patient respiratory status purely by volume overload and with her renal failure and hemodialysis. 11/20/2023  Patient seen and examined on [facility]. Patient is on the BiPAP feels better. Patient duration of breath with any activity. She denies any current chest or abdominal pain. Case discussed with pulmonology today. Electrolytes are stable with blood sugars ranging 161-239.   He is normal with a as well. Respiratory panel ordered. Home medication reviewed  Consult pulmonology  Start IV vancomycin and cefepime until seen by pulmonology  Repeat procalcitonin  Patient receiving dialysis 11/19, 4/20, 11/21    Bronchoscopy 11/22    Discharge home when okay with pulmonology    O2 saturation with activity on O2 nasal cannula 11/27 with possible discharge home    BMP, CBC in a.m.       Leta Boyle DO  12:02 PM  11/27/2023

## 2023-11-27 NOTE — PROGRESS NOTES
Physical Therapy  Physical Therapy    Room #:   5429/9576-74    Date: 2023       Patient Name: Leanne Heath  : 1960      MRN: [id number]     Patient unavailable for physical therapy treatment due to off floor at dialysis. Will attempt PT treatment at a later time. Thank you. Brigid Dumas.  Melinda  [facility]  LIC # [id number]

## 2023-11-27 NOTE — PROGRESS NOTES
Physical Therapy    Physical Therapy Treatment Note/Plan of Care    Room #:  4961/3051-36  Patient Name: Carlye Prader  YOB: 1960  MRN: [id number]    Date of Service: 11/27/2023     Tentative placement recommendation: Home with Home Health Physical Therapy  Equipment recommendation: Wheelchair  18 inch  light wt , leg rest, o2 carrier, regular removable arms, foot rests, foam cushion,     Evaluating Physical Therapist: Charisma Forbes PT  #[id number]      Specific Provider Orders/Date/Referring Provider :    PT eval and treat  Start:  11/19/23 0500,   End:  11/19/23 0500,   ONE TIME,   Standing Count:  1 Occurrences,   R         Baldo, Ismail U, DO     Admitting Diagnosis:   Peripheral edema [R60.9]  Acute respiratory failure with hypoxia ([address]) [J96.01]    Admitted with    shortness of breath with 4 Liters of o2 via nasal cannula chronic  Pulmonary hypertension    Surgery: none  Visit Diagnoses         Codes    Peripheral edema     R60.9    Body aches     R52    COPD exacerbation ([address])     J44.1    Pneumonia due to infectious organism, unspecified laterality, unspecified part of lung     J18.9            Patient Active Problem List   Diagnosis    Encounter regarding vascular access for dialysis for ESRD ([address])    Stage 5 chronic kidney disease on chronic dialysis ([address])    Chest pain    Drug-induced tremor    Orthostatic hypotension    Syncope and collapse    Hyperkalemia    Severe pulmonary hypertension (HCC)    Severe obesity (BMI 35.0-35.9 with comorbidity) ([address])    Acute on chronic heart failure with preserved ejection fraction (HCC)    VHD (valvular heart disease)    Acute respiratory failure with hypoxia ([address])        ASSESSMENT of Current Deficits Patient exhibits decreased strength, balance, endurance, and pain    impairing functional mobility, transfers, gait , gait distance, and tolerance to activity are barriers to d/c and require skilled intervention to address concerns listed above to increase

## 2023-11-27 NOTE — PROGRESS NOTES
[facility] MD Jose Dumont MD Tandy Coddington, MD Earmon Gentles, CNP Crystal Lambling, GREG Olguin CNP  Progress Note    11/27/2023    SUBJECTIVE:   11/20: Seen while on dialysis. Tolerating therapy without issues. BP is stable. She is on the BiPAP. Denies dyspnea. Appetite is good. Would like HD again tomorrow. 11.21 seen in HD this the 2th treatment in a row . Tolerating UF BP stable on BIPAP    11/22 in bronchscopy lab charts reviewed    11/23 chart reviewed  On o2nc     11/25 comfortable on o2/nc lying flat feel better cxr clear    11/26 clinically stable   Next HD planned for am     11/27 seen on HD tolerating treatment   Still having issues with o2 sats with activity   Appear euvolemic   Dw HD RN     PROBLEM LIST:    Principal Problem:    Acute respiratory failure with hypoxia (HCC)  Resolved Problems:    * No resolved hospital problems. *         DIET:    ADULT DIET; Regular; 5 carb choices (75 gm/meal);  Low Sodium (2 gm)     MEDS (scheduled):    rOPINIRole  1 mg Oral TID    acetylcysteine  4 mL Inhalation Q4H While awake    predniSONE  20 mg Oral Daily    ipratropium 0.5 mg-albuterol 2.5 mg  1 Dose Inhalation Q4H WA RT    linaclotide  290 mcg Oral QAM AC    epoetin maggi-epbx  30 Units/kg SubCUTAneous Once per day on Mon Wed Fri    entecavir  0.5 mg Oral Every Other Day    sodium chloride flush  5-40 mL IntraVENous 2 times per day    aspirin  81 mg Oral Daily    citalopram  40 mg Oral Lunch    melatonin  5 mg Oral Nightly    midodrine  5 mg Oral TID WC    pantoprazole  40 mg Oral Lunch    primidone  50 mg Oral Daily    sevelamer  800 mg Oral TID WC    tacrolimus  3 mg Oral BID    budesonide  0.5 mg Nebulization BID RT       MEDS (infusions):   dextrose      dextrose      sodium chloride         MEDS (prn):  glucose, dextrose bolus **OR** dextrose bolus, glucagon (rDNA), dextrose, calcium carbonate, oxyCODONE-acetaminophen, bisacodyl, polyethylene

## 2023-11-27 NOTE — CARE COORDINATION
Patient had dialysis this am. Plan remains home at [place]. Pt resides with connie; attends [facility] at 8 am transported via [address]. DME order noted for 18 inch wheelchair with leg rests, 02 carrier, removable arms and foam cushion. Gutierrez at [facility] notified of wc order however [address] TO WALK DISTANCES DUE TO RESPIRATORY STATUS; before they can deliver to patient, the wc is in stock per liaison. Pt has home 02 via [employer], is on 5 liters here. Liaison relays will need NEW PULSE OX TESTING AND HOME 02 ORDER for billing purposes, pt has portable tanks. Per charting pt is not eligible for a new BIPAP hers was recalled and pt to contact Hemant Cruz at [phone] to register for a new one, tru aware as well. OLIMPIA noted for [facility], vm message left for constance Haji. Tru to transport home.

## 2023-11-28 VITALS
RESPIRATION RATE: 23 BRPM | OXYGEN SATURATION: 94 % | HEART RATE: 64 BPM | HEIGHT: 63 IN | TEMPERATURE: 96.8 F | BODY MASS INDEX: 37.38 KG/M2 | SYSTOLIC BLOOD PRESSURE: 129 MMHG | WEIGHT: 210.98 LBS | DIASTOLIC BLOOD PRESSURE: 56 MMHG

## 2023-11-28 LAB
ALBUMIN SERPL-MCNC: 3.3 G/DL (ref 3.5–5.2)
ALP SERPL-CCNC: 58 U/L (ref 35–104)
ALT SERPL-CCNC: 22 U/L (ref 0–32)
ANION GAP SERPL CALCULATED.3IONS-SCNC: 13 MMOL/L (ref 7–16)
AST SERPL-CCNC: 19 U/L (ref 0–31)
BASOPHILS # BLD: 0.01 K/UL (ref 0–0.2)
BASOPHILS NFR BLD: 0 % (ref 0–2)
BILIRUB SERPL-MCNC: 0.6 MG/DL (ref 0–1.2)
BUN SERPL-MCNC: 43 MG/DL (ref 6–23)
CALCIUM SERPL-MCNC: 8.6 MG/DL (ref 8.6–10.2)
CHLORIDE SERPL-SCNC: 92 MMOL/L (ref 98–107)
CO2 SERPL-SCNC: 26 MMOL/L (ref 22–29)
CREAT SERPL-MCNC: 5 MG/DL (ref 0.5–1)
EOSINOPHIL # BLD: 0.19 K/UL (ref 0.05–0.5)
EOSINOPHILS RELATIVE PERCENT: 3 % (ref 0–6)
ERYTHROCYTE [DISTWIDTH] IN BLOOD BY AUTOMATED COUNT: 15 % (ref 11.5–15)
GFR SERPL CREATININE-BSD FRML MDRD: 9 ML/MIN/1.73M2
GLUCOSE BLD-MCNC: 112 MG/DL (ref 74–99)
GLUCOSE BLD-MCNC: 148 MG/DL (ref 74–99)
GLUCOSE SERPL-MCNC: 93 MG/DL (ref 74–99)
HCT VFR BLD AUTO: 26.9 % (ref 34–48)
HGB BLD-MCNC: 9 G/DL (ref 11.5–15.5)
IMM GRANULOCYTES # BLD AUTO: 0.17 K/UL (ref 0–0.58)
IMM GRANULOCYTES NFR BLD: 3 % (ref 0–5)
LYMPHOCYTES NFR BLD: 0.97 K/UL (ref 1.5–4)
LYMPHOCYTES RELATIVE PERCENT: 16 % (ref 20–42)
MCH RBC QN AUTO: 35.2 PG (ref 26–35)
MCHC RBC AUTO-ENTMCNC: 33.5 G/DL (ref 32–34.5)
MCV RBC AUTO: 105.1 FL (ref 80–99.9)
MONOCYTES NFR BLD: 0.41 K/UL (ref 0.1–0.95)
MONOCYTES NFR BLD: 7 % (ref 2–12)
NEUTROPHILS NFR BLD: 70 % (ref 43–80)
NEUTS SEG NFR BLD: 4.15 K/UL (ref 1.8–7.3)
PLATELET # BLD AUTO: 83 K/UL (ref 130–450)
PLATELET CONFIRMATION: NORMAL
PMV BLD AUTO: 9.7 FL (ref 7–12)
POTASSIUM SERPL-SCNC: 4.7 MMOL/L (ref 3.5–5)
PROT SERPL-MCNC: 6.3 G/DL (ref 6.4–8.3)
RBC # BLD AUTO: 2.56 M/UL (ref 3.5–5.5)
SODIUM SERPL-SCNC: 131 MMOL/L (ref 132–146)
WBC OTHER # BLD: 5.9 K/UL (ref 4.5–11.5)

## 2023-11-28 PROCEDURE — 94660 CPAP INITIATION&MGMT: CPT

## 2023-11-28 PROCEDURE — 85025 COMPLETE CBC W/AUTO DIFF WBC: CPT

## 2023-11-28 PROCEDURE — 2700000000 HC OXYGEN THERAPY PER DAY

## 2023-11-28 PROCEDURE — 6370000000 HC RX 637 (ALT 250 FOR IP): Performed by: INTERNAL MEDICINE

## 2023-11-28 PROCEDURE — 94640 AIRWAY INHALATION TREATMENT: CPT

## 2023-11-28 PROCEDURE — 2580000003 HC RX 258: Performed by: INTERNAL MEDICINE

## 2023-11-28 PROCEDURE — 97530 THERAPEUTIC ACTIVITIES: CPT

## 2023-11-28 PROCEDURE — 97116 GAIT TRAINING THERAPY: CPT

## 2023-11-28 PROCEDURE — 80053 COMPREHEN METABOLIC PANEL: CPT

## 2023-11-28 PROCEDURE — 6370000000 HC RX 637 (ALT 250 FOR IP)

## 2023-11-28 PROCEDURE — 82962 GLUCOSE BLOOD TEST: CPT

## 2023-11-28 PROCEDURE — 97110 THERAPEUTIC EXERCISES: CPT

## 2023-11-28 PROCEDURE — 6360000002 HC RX W HCPCS: Performed by: INTERNAL MEDICINE

## 2023-11-28 PROCEDURE — 36415 COLL VENOUS BLD VENIPUNCTURE: CPT

## 2023-11-28 RX ORDER — IPRATROPIUM BROMIDE AND ALBUTEROL SULFATE 2.5; .5 MG/3ML; MG/3ML
3 SOLUTION RESPIRATORY (INHALATION)
Qty: 360 ML | Refills: 3 | Status: SHIPPED | OUTPATIENT
Start: 2023-11-28

## 2023-11-28 RX ORDER — PREDNISONE 20 MG/1
20 TABLET ORAL DAILY
Qty: 10 TABLET | Refills: 0 | Status: SHIPPED | OUTPATIENT
Start: 2023-11-29 | End: 2023-12-09

## 2023-11-28 RX ORDER — OXYCODONE HYDROCHLORIDE AND ACETAMINOPHEN 5; 325 MG/1; MG/1
1 TABLET ORAL EVERY 12 HOURS PRN
Qty: 10 TABLET | Refills: 0 | Status: SHIPPED | OUTPATIENT
Start: 2023-11-28 | End: 2023-12-03

## 2023-11-28 RX ORDER — CALCIUM CARBONATE 500 MG/1
500 TABLET, CHEWABLE ORAL 3 TIMES DAILY PRN
COMMUNITY
Start: 2023-11-28 | End: 2023-12-28

## 2023-11-28 RX ADMIN — PRIMIDONE 50 MG: 50 TABLET ORAL at 07:49

## 2023-11-28 RX ADMIN — IPRATROPIUM BROMIDE AND ALBUTEROL SULFATE 1 DOSE: .5; 2.5 SOLUTION RESPIRATORY (INHALATION) at 14:39

## 2023-11-28 RX ADMIN — SEVELAMER CARBONATE 800 MG: 800 TABLET, FILM COATED ORAL at 07:49

## 2023-11-28 RX ADMIN — ENTECAVIR 0.5 MG: 0.5 TABLET ORAL at 07:49

## 2023-11-28 RX ADMIN — Medication 10 ML: at 07:52

## 2023-11-28 RX ADMIN — ACETYLCYSTEINE 400 MG: 100 INHALANT RESPIRATORY (INHALATION) at 06:43

## 2023-11-28 RX ADMIN — ACETYLCYSTEINE 400 MG: 100 INHALANT RESPIRATORY (INHALATION) at 10:29

## 2023-11-28 RX ADMIN — BUDESONIDE INHALATION 500 MCG: 0.5 SUSPENSION RESPIRATORY (INHALATION) at 06:43

## 2023-11-28 RX ADMIN — OXYCODONE AND ACETAMINOPHEN 1 TABLET: 5; 325 TABLET ORAL at 07:49

## 2023-11-28 RX ADMIN — IPRATROPIUM BROMIDE AND ALBUTEROL SULFATE 1 DOSE: .5; 2.5 SOLUTION RESPIRATORY (INHALATION) at 10:28

## 2023-11-28 RX ADMIN — PANTOPRAZOLE SODIUM 40 MG: 40 TABLET, DELAYED RELEASE ORAL at 11:33

## 2023-11-28 RX ADMIN — MIDODRINE HYDROCHLORIDE 5 MG: 5 TABLET ORAL at 08:36

## 2023-11-28 RX ADMIN — ACETYLCYSTEINE 400 MG: 100 INHALANT RESPIRATORY (INHALATION) at 14:39

## 2023-11-28 RX ADMIN — TACROLIMUS 3 MG: 1 CAPSULE ORAL at 07:49

## 2023-11-28 RX ADMIN — ROPINIROLE HYDROCHLORIDE 1 MG: 1 TABLET, FILM COATED ORAL at 07:49

## 2023-11-28 RX ADMIN — ROPINIROLE HYDROCHLORIDE 1 MG: 1 TABLET, FILM COATED ORAL at 14:03

## 2023-11-28 RX ADMIN — SEVELAMER CARBONATE 800 MG: 800 TABLET, FILM COATED ORAL at 11:33

## 2023-11-28 RX ADMIN — ASPIRIN 81 MG CHEWABLE TABLET 81 MG: 81 TABLET CHEWABLE at 07:49

## 2023-11-28 RX ADMIN — IPRATROPIUM BROMIDE AND ALBUTEROL SULFATE 1 DOSE: .5; 2.5 SOLUTION RESPIRATORY (INHALATION) at 06:43

## 2023-11-28 RX ADMIN — CITALOPRAM HYDROBROMIDE 40 MG: 20 TABLET ORAL at 11:33

## 2023-11-28 RX ADMIN — PREDNISONE 20 MG: 20 TABLET ORAL at 07:49

## 2023-11-28 ASSESSMENT — PAIN DESCRIPTION - ORIENTATION: ORIENTATION: RIGHT;LEFT

## 2023-11-28 ASSESSMENT — PAIN DESCRIPTION - DESCRIPTORS: DESCRIPTORS: ACHING;DISCOMFORT

## 2023-11-28 ASSESSMENT — PAIN DESCRIPTION - LOCATION: LOCATION: LEG

## 2023-11-28 ASSESSMENT — PAIN SCALES - GENERAL
PAINLEVEL_OUTOF10: 9
PAINLEVEL_OUTOF10: 4

## 2023-11-28 NOTE — CARE COORDINATION
DC order noted. Plan home. Fiance at bedside and can transport home. Attends [facility] at 8 am transported via [employer] [facility]. Notified Kavita Gray at [facility] of [place] and patient to be at dialysis tomorrow. DME order noted for Nebulizer, notified Marcos Aaron at [facility] they will deliver to patients home. Verified with [employer], that patient has an oxygen concentrator that goes to 10L at home and portables. She is aware she is not eligible for a new BIPAP hers was recalled and pt to contact [facility] at [phone] to register for a new one, ana aware as well. DME order noted for 18 inch wheelchair with leg rests, 02 carrier, removable arms and foam cushion. Gutierrez at [facility] notified of wc order he states the office will review notes and if notes are ok for Medicare, they will deliver this to patients house as well. OLIMPIA noted for [employer], notified Tee Gillespie at [place] of [address] home today.

## 2023-11-28 NOTE — PROGRESS NOTES
Physician Progress Note      PATIENT:               Crala Dalal  CSN #:                  [id number]  :                       1960  ADMIT DATE:       2023 5:59 PM  [facility] DATE:  Leah Peck  PROVIDER #:        Aishwarya Louis DO          QUERY TEXT:    Pt admitted with Acute on chronic respiratory failure. Pt noted to have   pneumonitis in H&P, IM PN and suspected pneumonia in Pulmonology notes. If   possible, please document in the progress notes and discharge summary if you   are evaluating and/or treating any of the following:    Note: CAP and HCAP indicate where the pneumonia was acquired, not a specific   type. The medical record reflects the following:  Risk Factors: AECopd, A/C Resp failure, morbid obesity  Clinical Indicators: Procalcitonin: 0.45, CTA. .. 5. Discrete areas of   atelectasis and suspect minimal ground-glass opacity in  the right upper lobe could represent in addition to subsegmental atelectasis   discrete areas of ongoing pneumonitis  Treatment: Solumedrol, multiple nebs, Maxipime, Vanc, oxygen, Pulmonology   consult.     Thank you, Daron Degroot RN [facility]  [phone]  Options provided:  -- Gram negative pneumonia  -- Pneumonitis  -- Other - I will add my own diagnosis  -- Disagree - Not applicable / Not valid  -- Disagree - Clinically unable to determine / Unknown  -- Refer to Clinical Documentation Reviewer    PROVIDER RESPONSE TEXT:    See pulmonology note    Query created by: Daron Degroot on 2023 10:33 AM      Electronically signed by:  Aishwarya Louis DO 2023 2:22 PM

## 2023-11-28 NOTE — PROGRESS NOTES
CLINICAL PHARMACY NOTE: MEDS TO BEDS    Total # of Prescriptions Filled: 2   The following medications were delivered to the patient:  Prednisone 20 mg   Duoneb       Additional Documentation:

## 2023-11-28 NOTE — PROGRESS NOTES
Patient requesting pain medication for pain \"all over. \" After discussing pain management options with patient and trying other PRN options, patient stated \"if you felt pain like I do you would understand why I need it. \" PRN fentanyl 25 mg administered to patient as ordered.

## 2023-11-28 NOTE — PROGRESS NOTES
[facility] MD Pauly Hollis MD Jeana Gray, MD Maxene Cough, NORY Oden, GREG Liu, NORY  Progress Note    11/28/2023    SUBJECTIVE:     11/20: Seen while on dialysis. Tolerating therapy without issues. BP is stable. She is on the BiPAP. Denies dyspnea. Appetite is good. Would like HD again tomorrow. 11.21 seen in HD this the 2th treatment in a row . Tolerating UF BP stable on BIPAP    11/22 in bronchscopy lab charts reviewed    11/23 chart reviewed  On o2nc     11/25 comfortable on o2/nc lying flat feel better cxr clear    11/26 clinically stable   Next HD planned for am     11/27 seen on HD tolerating treatment   Still having issues with o2 sats with activity   Appear euvolemic   Dw HD RN     11/28 : stable vitals . PROBLEM LIST:    Principal Problem:    Acute respiratory failure with hypoxia (HCC)  Resolved Problems:    * No resolved hospital problems. *         DIET:    ADULT DIET; Regular; 5 carb choices (75 gm/meal);  Low Sodium (2 gm)     MEDS (scheduled):    rOPINIRole  1 mg Oral TID    acetylcysteine  4 mL Inhalation Q4H While awake    predniSONE  20 mg Oral Daily    ipratropium 0.5 mg-albuterol 2.5 mg  1 Dose Inhalation Q4H WA RT    linaclotide  290 mcg Oral QAM AC    epoetin maggi-epbx  30 Units/kg SubCUTAneous Once per day on Mon Wed Fri    entecavir  0.5 mg Oral Every Other Day    sodium chloride flush  5-40 mL IntraVENous 2 times per day    aspirin  81 mg Oral Daily    citalopram  40 mg Oral Lunch    melatonin  5 mg Oral Nightly    midodrine  5 mg Oral TID WC    pantoprazole  40 mg Oral Lunch    primidone  50 mg Oral Daily    sevelamer  800 mg Oral TID WC    tacrolimus  3 mg Oral BID    budesonide  0.5 mg Nebulization BID RT       MEDS (infusions):   dextrose      dextrose      sodium chloride         MEDS (prn):  glucose, dextrose bolus **OR** dextrose bolus, glucagon (rDNA), dextrose, calcium carbonate, oxyCODONE-acetaminophen, and DMARDs in setting treatment for RA     Recommendations  Continue IHD support for solute and volume clearance as per outpatient orders and dry weight  Continue Retacrit per protocol  Continue sevelamer - PO4 5.1  Continue other aspects of renal management  Dulcolax p.o. as needed  Follow labs, BP  Continue supportive care        Electronically signed by Eulalia Gonzalez MD on 11/28/2023 at 4:46 PM

## 2023-11-28 NOTE — DISCHARGE SUMMARY
Department of Internal Medicine  DS    PCP: Suzi Pérez DO  Admitting Physician: Dr. Rita Daniel  Consultants:   Date of Service: 11/18/2023    CHIEF COMPLAINT:  sob    HISTORY OF PRESENT ILLNESS:    Patient is 72-year-old female presented to the ED with shortness of breath. Patient states she has chronic shortness of breath she is on 4 L of supplemental oxygen at baseline. However over the last 3 days she has been increasingly more short of breath. She does not admit to significant increase in cough or sputum production. He does she does have orthopnea. She does admit to increased fatigue and generalized body tenderness. She also admits to headache and neck pain. States that she has diminished appetite over the last few days due to sickness. 11/19/2023  Patient seen examined on [facility]. Case discussed with pulmonologist today. BUN/creatinine 40/6.7 potassium 5.1. Liver enzymes normal with. Patient had proBNP on admission greater than 70,000. WBC 1.4 with hemoglobin 8 and platelet count of 62. Temperature 97.3 with heart rate 66 and blood pressure 117/56. O2 sat 98% with the patient on BiPAP FiO2 60%. Chest x-ray on admission was unremarkable with a CTA showing no evidence of PE with atelectasis with this discrete area of atelectasis and suspected minimal groundglass opacity right upper lobe. With patient's procalcitonin increased to 0.45 we will start antibiotics with vancomycin and cefepime with the patient just finishing up doxycycline and Omnicef. Pulmonology to evaluate for possible HCAP versus patient respiratory status purely by volume overload and with her renal failure and hemodialysis. 11/20/2023  Patient seen and examined on [facility]. Patient is on the BiPAP feels better. Patient duration of breath with any activity. She denies any current chest or abdominal pain. Case discussed with pulmonology today. Electrolytes are stable with blood sugars ranging 161-239.   He is normal with a I don't recall seeing that Housing Stability Vital Sign     Unable to Pay for Housing in the Last Year: No     Number of Places Lived in the Last Year: 1     Unstable Housing in the Last Year: No       ROS: Positive in bold  General:   Denies chills, fatigue, fever, malaise, night sweats or weight loss    Psychological:   Denies anxiety, disorientation or hallucinations    ENT:    Denies epistaxis, headaches, vertigo or visual changes    Cardiovascular:   Denies any chest pain, irregular heartbeats, or palpitations. No paroxysmal nocturnal dyspnea. Respiratory:   Denies shortness of breath, coughing, sputum production, hemoptysis, or wheezing. No orthopnea. Gastrointestinal:   Denies nausea, vomiting, diarrhea, or constipation. Denies any abdominal pain. Denies change in bowel habits or stools. Genito-Urinary:    Denies any urgency, frequency, hematuria. Voiding without difficulty. Musculoskeletal:   Denies joint pain, joint stiffness, joint swelling or muscle pain    Neurology:    Denies any headache or focal neurological deficits. No weakness or paresthesia. Derm:    Denies any rashes, ulcers, or excoriations. Denies bruising. Extremities:   Denies any lower extremity swelling or edema. PHYSICAL EXAM: Abnormal findings noted  VITALS:  Vitals:    11/28/23 1230   BP: (!) 129/56   Pulse: 64   Resp:    Temp:    SpO2:          CONSTITUTIONAL:    Awake, alert, cooperative, no apparent distress, and appears stated age    EYES:     EOMI, sclera clear, conjunctiva normal    ENT:    Normocephalic, atraumatic, External ears without lesions. NECK:    Supple, symmetrical, trachea midline,  no JVD    HEMATOLOGIC/LYMPHATICS:    No cervical lymphadenopathy and no supraclavicular lymphadenopathy    LUNGS:    Symmetric.  No increased work of breathing, good air exchange, clear to auscultation bilaterally, no wheezes, rhonchi, or rales,     CARDIOVASCULAR:    Normal apical impulse, regular rate and rhythm, normal S1 and

## 2023-11-28 NOTE — PROGRESS NOTES
Physical Therapy    Physical Therapy Treatment Note/Plan of Care    Room #:  2408/2294-05  Patient Name: Laruel Holm  YOB: 1960  MRN: [id number]    Date of Service: 11/28/2023     Tentative placement recommendation: Home with Home Health Physical Therapy  Equipment recommendation: Wheelchair  18 inch  light wt , leg rest, o2 carrier, regular removable arms, foot rests, foam cushion,     Evaluating Physical Therapist: Aleck Brittle, PT  #[id number]      Specific Provider Orders/Date/Referring Provider :    PT eval and treat  Start:  11/19/23 0500,   End:  11/19/23 0500,   ONE TIME,   Standing Count:  1 Occurrences,   R         Baldo, Ismail U, DO     Admitting Diagnosis:   Peripheral edema [R60.9]  Acute respiratory failure with hypoxia ([address]) [J96.01]    Admitted with    shortness of breath with 4 Liters of o2 via nasal cannula chronic  Pulmonary hypertension    Surgery: none  Visit Diagnoses         Codes    Peripheral edema     R60.9    Body aches     R52    COPD exacerbation ([address])     J44.1    Pneumonia due to infectious organism, unspecified laterality, unspecified part of lung     J18.9            Patient Active Problem List   Diagnosis    Encounter regarding vascular access for dialysis for ESRD ([address])    Stage 5 chronic kidney disease on chronic dialysis ([address])    Chest pain    Drug-induced tremor    Orthostatic hypotension    Syncope and collapse    Hyperkalemia    Severe pulmonary hypertension (HCC)    Severe obesity (BMI 35.0-35.9 with comorbidity) ([address])    Acute on chronic heart failure with preserved ejection fraction (HCC)    VHD (valvular heart disease)    Acute respiratory failure with hypoxia ([address])        ASSESSMENT of Current Deficits Patient exhibits decreased strength, balance, endurance, and pain    impairing functional mobility, transfers, gait , gait distance, and tolerance to activity are barriers to d/c and require skilled intervention to address concerns listed above to increase Mobility Raw Score : 15  AM-PAC Inpatient T-Scale Score : 39.45  Mobility Inpatient CMS 0-100% Score: 57.7  Mobility Inpatient CMS G-Code Modifier : CK    Nursing cleared patient for PT treatment. OBJECTIVE:   Initial Evaluation  Date: 11/20/2023 Treatment Date:  11/28/2023       Short Term/ Long Term   Goals   Was pt agreeable to Eval/treatment? Yes yes To be met in 5 days   Pain level    4/10  All over 9/10  Chronic pain all over    Bed Mobility  Using rails and head of bed elevated:     Rolling: Minimal assist of 1    Supine to sit: Minimal assist of 1    Sit to supine: Not assessed patient seated edge of bed    Scooting: Minimal assist of 1   Using rails and head of bed elevated:     Rolling: Supervision    Supine to sit: Supervision    Sit to supine: Supervision    Scooting: Supervision     Rolling: Independent    Supine to sit:  Independent    Sit to supine: Independent    Scooting: Independent     Transfers Sit to stand: Minimal assist of 1   Sit to stand: Supervision  Cues for hand placement and safety     Sit to stand: Independent     Ambulation     2x25 and 1 x 50  feet using  wheeled walker with Minimal progressing to supervision   tremors noted and po2 monitored both which impair distance 2 x 70 feet using  wheeled walker with Minimal assist of 1   cues for upright posture, increased base of support, increased step length, safety, pacing, and pursed lip breathing     50 feet using  wheeled walker with Supervision     Stair negotiation: ascended and descended   Not assessed     1 step, without rail, min a     ROM Within functional limits        Strength BUE:  refer to OT eval  RLE:  3+/5  LLE:  3+/5  Increase strength in affected mm groups by 1/3 grade   Balance Sitting EOB:  fair    Dynamic Standing:  fair minus with wheeled walker  Sitting EOB: good -  Dynamic Standing: fair with wheeled walker   Sitting EOB:  good    Dynamic Standing: good       Patient is Alert & Oriented x person, place, time,

## 2023-12-02 LAB
MICROORGANISM SPEC CULT: NORMAL
MICROORGANISM/AGENT SPEC: NORMAL
SPECIMEN DESCRIPTION: NORMAL

## 2023-12-03 LAB
MICROORGANISM SPEC CULT: NORMAL
MICROORGANISM/AGENT SPEC: NORMAL
SPECIMEN DESCRIPTION: NORMAL

## 2023-12-05 NOTE — PROGRESS NOTES
Physician Progress Note      PATIENTDuane Prude  CSN #:                  [id number]  :                       1960  ADMIT DATE:       2023 5:59 PM  [facility] DATE:        2023 5:27 PM  RESPONDING  PROVIDER #:        Dre Ash MD          QUERY TEXT:    Pt admitted with Acute on chronic respiratory failure. Pt noted to have   pneumonitis in H&P, IM PN and suspected pneumonia in Pulmonology notes. If   possible, please document in the progress notes and discharge summary if you   are evaluating and/or treating any of the following:    Note: CAP and HCAP indicate where the pneumonia was acquired, not a specific   type. The medical record reflects the following:  Risk Factors: AECopd, A/C Resp failure, morbid obesity  Clinical Indicators: Procalcitonin: 0.45, CTA. .. 5. Discrete areas of   atelectasis and suspect minimal ground-glass opacity in  the right upper lobe could represent in addition to subsegmental atelectasis   discrete areas of ongoing pneumonitis  Treatment: Solumedrol, multiple nebs, Maxipime, Vanc, oxygen, Pulmonology   consult. Thank you, Jomar Dunaway RN [facility]  [phone]  Options provided:  -- Gram negative pneumonia  -- Pneumonitis  -- Other - I will add my own diagnosis  -- Disagree - Not applicable / Not valid  -- Disagree - Clinically unable to determine / Unknown  -- Refer to Clinical Documentation Reviewer    PROVIDER RESPONSE TEXT:    This patient has pneumonitis.     Query created by: Jomar Dunaway on 2023 2:54 PM      Electronically signed by:  Dre Ash MD 2023 10:02 AM

## 2023-12-09 LAB
MICROORGANISM SPEC CULT: NORMAL
MICROORGANISM/AGENT SPEC: NORMAL
SPECIMEN DESCRIPTION: NORMAL

## 2023-12-10 LAB
MICROORGANISM SPEC CULT: NORMAL
MICROORGANISM/AGENT SPEC: NORMAL
SPECIMEN DESCRIPTION: NORMAL

## 2023-12-11 PROBLEM — J96.21 ACUTE ON CHRONIC RESPIRATORY FAILURE WITH HYPOXIA AND HYPERCAPNIA (HCC): Status: ACTIVE | Noted: 2023-12-11

## 2023-12-11 PROBLEM — J96.22 ACUTE ON CHRONIC RESPIRATORY FAILURE WITH HYPOXIA AND HYPERCAPNIA (HCC): Status: ACTIVE | Noted: 2023-12-11

## 2023-12-12 PROBLEM — N18.6 ESRD (END STAGE RENAL DISEASE) (HCC): Status: ACTIVE | Noted: 2023-12-12

## 2023-12-12 PROBLEM — I95.9 HYPOTENSION: Status: ACTIVE | Noted: 2023-12-12

## 2023-12-12 PROBLEM — J96.21 ACUTE ON CHRONIC RESPIRATORY FAILURE WITH HYPOXIA (HCC): Status: ACTIVE | Noted: 2023-11-18

## 2023-12-12 PROBLEM — D69.6 THROMBOCYTOPENIA (HCC): Status: ACTIVE | Noted: 2023-12-12

## 2023-12-16 LAB
MICROORGANISM SPEC CULT: NORMAL
MICROORGANISM/AGENT SPEC: NORMAL
SPECIMEN DESCRIPTION: NORMAL

## 2023-12-17 LAB
MICROORGANISM SPEC CULT: NORMAL
MICROORGANISM/AGENT SPEC: NORMAL
SPECIMEN DESCRIPTION: NORMAL

## 2023-12-30 LAB
MICROORGANISM SPEC CULT: NORMAL
MICROORGANISM/AGENT SPEC: NORMAL
SPECIMEN DESCRIPTION: NORMAL

## 2024-01-06 LAB
MICROORGANISM SPEC CULT: NORMAL
MICROORGANISM/AGENT SPEC: NORMAL
SPECIMEN DESCRIPTION: NORMAL

## (undated) PROCEDURE — 5A1D70Z PERFORMANCE OF URINARY FILTRATION, INTERMITTENT, LESS THAN 6 HOURS PER DAY: ICD-10-PCS

## (undated) DEVICE — DRAPE,HAND,STERILE: Brand: MEDLINE

## (undated) DEVICE — PAD,EYE,1-5/8X2 5/8,STERILE,LF,1/PK: Brand: MEDLINE

## (undated) DEVICE — YANKAUER,BULB TIP,W/O VENT,RIGID,STERILE: Brand: MEDLINE

## (undated) DEVICE — CANNULA INJ L2.5IN BLNT TIP 3MM CLR BODY W/ 1 W VLV DLP

## (undated) DEVICE — GOWN,AURORA,NONREINF,RAGLAN,L,STERILE: Brand: MEDLINE

## (undated) DEVICE — TUBING, SUCTION, 1/4" X 10', STRAIGHT: Brand: MEDLINE

## (undated) DEVICE — BLOCK BITE 60FR CAREGUARD

## (undated) DEVICE — TRAP,MUCUS SPECIMEN, 80CC: Brand: MEDLINE

## (undated) DEVICE — TOWEL,OR,DSP,ST,BLUE,STD,6/PK,12PK/CS: Brand: MEDLINE

## (undated) DEVICE — MASK,FACE,MAXFLUIDPROTECT,SHIELD/ERLPS: Brand: MEDLINE

## (undated) DEVICE — PATIENT RETURN ELECTRODE, SINGLE-USE, CONTACT QUALITY MONITORING, ADULT, WITH 9FT CORD, FOR PATIENTS WEIGING OVER 33LBS. (15KG): Brand: MEGADYNE

## (undated) DEVICE — SURE SET-DOUBLE BASIN-LF: Brand: MEDLINE INDUSTRIES, INC.

## (undated) DEVICE — LOOP VES W13MM THK09MM MINI RED SIL FLD REPELLENT

## (undated) DEVICE — SCANLAN® VASCU-STATT® SINGLE-USE BULLDOG CLAMP - MIDI ANGLED 45° (WHITE), CLAMPING PRESSURE 25-30 G (2/STERILE PKG): Brand: SCANLAN® VASCU-STATT® SINGLE-USE BULLDOG CLAMP

## (undated) DEVICE — KIT BEDSIDE REVITAL OX 500ML

## (undated) DEVICE — SYRINGE 20ML LL S/C 50

## (undated) DEVICE — GLOVE SURG SZ 75 L12IN FNGR THK79MIL GRN LTX FREE

## (undated) DEVICE — BLADE CLIPPER GEN PURP NS

## (undated) DEVICE — CLAMP INSERT: Brand: STEALTH® CLAMP INSERT

## (undated) DEVICE — NEEDLE HYPO 25GA L1.5IN BLU POLYPR HUB S STL REG BVL STR

## (undated) DEVICE — GAUZE,SPONGE,4"X4",16PLY,XRAY,STRL,LF: Brand: MEDLINE

## (undated) DEVICE — SLING ARM XL L20IN D75IN WHT POLY MESH ENVELOP MTL SIDE

## (undated) DEVICE — SINGLE USE BIOPSY VALVE MAJ-210: Brand: SINGLE USE BIOPSY VALVE (STERILE)

## (undated) DEVICE — SURGICAL PROCEDURE PACK VASC MAJ CUST

## (undated) DEVICE — SYRINGE,TOOMEY,IRRIGATION,70CC,STERILE: Brand: MEDLINE

## (undated) DEVICE — JELLY,LUBE,STERILE,FLIP TOP,TUBE,4-OZ: Brand: MEDLINE

## (undated) DEVICE — Z DUP USE 2257490 ADHESIVE SKIN CLSRE 036ML TPCL 2CTL CNCRLTE HIGH VSCSTY DRMB

## (undated) DEVICE — SYRINGE MED 10ML LUERLOCK TIP W/O SFTY DISP

## (undated) DEVICE — GOWN ISOLATN XL BLU POLYPR OVR HD OPN BK KNIT CUF PROTCT

## (undated) DEVICE — NEEDLE SPNL 22GA L5IN BLK HUB S STL W/ QNCKE PNT W/OUT

## (undated) DEVICE — 4-PORT MANIFOLD: Brand: NEPTUNE 2

## (undated) DEVICE — LABEL MED 4 IN SURG PANEL W/ PEN STRL

## (undated) DEVICE — SET SURG INSTR MINI VASC

## (undated) DEVICE — BAG SPECIMEN BIOHAZARD 6IN X 9IN

## (undated) DEVICE — GLOVE ORANGE PI 8   MSG9080

## (undated) DEVICE — DILATOR ART

## (undated) DEVICE — GLOVE SURG SZ 75 CRM LTX FREE POLYISOPRENE POLYMER BEAD ANTI

## (undated) DEVICE — WIPES SKIN CLOTH READYPREP 9 X 10.5 IN 2% CHLORHEX GLUCONATE CHG PREOP

## (undated) DEVICE — CLOTH SURG PREP PREOPERATIVE CHLORHEXIDINE GLUC 2% READYPREP

## (undated) DEVICE — Device

## (undated) DEVICE — SOLUTION IV IRRIG 500ML 0.9% SODIUM CHL 2F7123

## (undated) DEVICE — COVER,LIGHT HANDLE,FLX,1/PK: Brand: MEDLINE INDUSTRIES, INC.

## (undated) DEVICE — DOUBLE BASIN SET: Brand: MEDLINE INDUSTRIES, INC.

## (undated) DEVICE — GLOVE ORANGE PI 7 1/2   MSG9075

## (undated) DEVICE — CATHETER ETER IV 20GA L1IN POLYUR STR RADPQ INTROCAN SFTY

## (undated) DEVICE — SINGLE USE SUCTION VALVE MAJ-209: Brand: SINGLE USE SUCTION VALVE (STERILE)